# Patient Record
Sex: FEMALE | Race: BLACK OR AFRICAN AMERICAN | NOT HISPANIC OR LATINO | Employment: OTHER | URBAN - METROPOLITAN AREA
[De-identification: names, ages, dates, MRNs, and addresses within clinical notes are randomized per-mention and may not be internally consistent; named-entity substitution may affect disease eponyms.]

---

## 2017-03-28 ENCOUNTER — TELEPHONE (OUTPATIENT)
Dept: NEUROLOGY | Facility: CLINIC | Age: 62
End: 2017-03-28

## 2017-03-28 ENCOUNTER — OFFICE VISIT (OUTPATIENT)
Dept: NEUROLOGY | Facility: CLINIC | Age: 62
End: 2017-03-28
Payer: MEDICARE

## 2017-03-28 VITALS
SYSTOLIC BLOOD PRESSURE: 110 MMHG | HEART RATE: 104 BPM | DIASTOLIC BLOOD PRESSURE: 70 MMHG | WEIGHT: 209.69 LBS | BODY MASS INDEX: 34.93 KG/M2 | HEIGHT: 65 IN

## 2017-03-28 DIAGNOSIS — G43.009 MIGRAINE WITHOUT AURA, WITHOUT MENTION OF INTRACTABLE MIGRAINE WITHOUT MENTION OF STATUS MIGRAINOSUS: Primary | ICD-10-CM

## 2017-03-28 PROCEDURE — 99999 PR PBB SHADOW E&M-EST. PATIENT-LVL III: CPT | Mod: PBBFAC,,, | Performed by: NURSE PRACTITIONER

## 2017-03-28 PROCEDURE — 99213 OFFICE O/P EST LOW 20 MIN: CPT | Mod: S$GLB,,, | Performed by: NURSE PRACTITIONER

## 2017-03-28 PROCEDURE — 1160F RVW MEDS BY RX/DR IN RCRD: CPT | Mod: S$GLB,,, | Performed by: NURSE PRACTITIONER

## 2017-03-28 RX ORDER — TOPIRAMATE 200 MG/1
200 TABLET ORAL 2 TIMES DAILY
Qty: 60 TABLET | Refills: 5 | Status: SHIPPED | OUTPATIENT
Start: 2017-03-28 | End: 2017-06-08

## 2017-03-28 NOTE — PROGRESS NOTES
"NEURO CLINIC:    Lacey Montana a 61 y.o. female returns for management of headaches. Not seen since 2015. She sees Dr. Franklin for mgt of seizures but wants another provider. HA are off and on. Takes aleve which helps abort pain. She rarely uses Compazine prn. She remains on Topamax 200mg bid. HA remains mild, located R occipital area occasionally radiating whole head, pounding, gradual onset, with infrequent auras (sour taste or burning smell x a few minutes (can also occur in the absence of a headache), associated with nausea, vomiting, photo/phono/osmophobia. Last seizure 2-mos ago when found out brother had cancer/hehas .     Triggers remain sleep deprivation, hunger, dietary triggers (pork, salty foods,excessive caffeine, excessive sweets, excessive chocolates), strong smells, STRESS  Aggravating Factors: bright light, loud noise, movement  Relieving Factors: rest in a dark quiet room, listening to soft music    She is living in [facility]    Medications tried:   Preventatives: Topamax 200 mg bid- started in  for seizures  Keppra 750 mg bid- started in  for seizures, now 1G bid  Abortives: Tramadol 50 mg- ineffective  Tylenol liquid-used to work      Reviewed Dec '15 AST/ALT    ROS: Persistent problems with sleep maintenance and initiation.  Stable mood. Denies falls.nocturia.   L sided residual weakness (CVA), wears left leg brace. 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 (patient unaware of location/size of aneursym, or whether she had clipping/coiling) complicated by seizures and with residual L hemiparesis      SH: on disability, used to work as a /  FH: negative for HA    HIT-6 score=54      PHYSICAL EXAM:   General: W/D, W/N, well-groomed   /70  Pulse 104  Ht 5' 4.5" (1.638 m)  Wt 95.1 kg (209 lb 10.5 oz)  BMI 35.43 kg/m2      IMPRESSION: "   Chronic migraine with and w/o aura   Anxiety   Insomnia (Problems with sleep initiation/maintenance)  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      Do not discontinue medications without advice. An adequate therapeutic trial of any preventative may take anywhere from 2-3 weeks or even up to 3 months and gradual upward titration of dose may be necessary in some patients       Abortive therapy: Avoid triptans/ergots due to hx of CVA    Continue Compazine 10 mg q6-8h prn for HA/nausea     Continue Aleve prn    Continue to regulate sleep, mealtimes, try to reduce stress and to avoid known headache triggers/aggravating factors (stress)     Advised regarding medication overuse headache. Try to limit acute/abortive medications to not more than 2-3 days/week to prevent further progression of headache     RTC 12 mos sooner if needed      See pcp stroke mgt or neuro for seizure mgt ([facility]).

## 2017-03-28 NOTE — TELEPHONE ENCOUNTER
VINI with scheduled appointment for with Dr.Van Resendez on [phone] as patient requested second opinion per Destiny Moreno. Appointment letter will be mailed to her home, and contact information has been left if patient needs to reschedule this.

## 2017-04-11 ENCOUNTER — PATIENT OUTREACH (OUTPATIENT)
Dept: ADMINISTRATIVE | Facility: HOSPITAL | Age: 62
End: 2017-04-11

## 2017-04-18 ENCOUNTER — OFFICE VISIT (OUTPATIENT)
Dept: NEUROLOGY | Facility: CLINIC | Age: 62
End: 2017-04-18
Payer: MEDICARE

## 2017-04-18 VITALS
DIASTOLIC BLOOD PRESSURE: 84 MMHG | HEIGHT: 65 IN | WEIGHT: 209.44 LBS | BODY MASS INDEX: 34.89 KG/M2 | SYSTOLIC BLOOD PRESSURE: 131 MMHG | HEART RATE: 65 BPM

## 2017-04-18 DIAGNOSIS — Z86.79 HISTORY OF INTRACRANIAL ANEURYSM: ICD-10-CM

## 2017-04-18 DIAGNOSIS — G40.109 PARTIAL SYMPTOMATIC EPILEPSY WITH SIMPLE PARTIAL SEIZURES, NOT INTRACTABLE, WITHOUT STATUS EPILEPTICUS: Primary | ICD-10-CM

## 2017-04-18 PROCEDURE — 99203 OFFICE O/P NEW LOW 30 MIN: CPT | Mod: S$PBB,,, | Performed by: PSYCHIATRY & NEUROLOGY

## 2017-04-18 PROCEDURE — 99999 PR PBB SHADOW E&M-EST. PATIENT-LVL III: CPT | Mod: PBBFAC,GC,, | Performed by: PSYCHIATRY & NEUROLOGY

## 2017-04-18 RX ORDER — LEVETIRACETAM 1000 MG/1
1500 TABLET ORAL 2 TIMES DAILY
Qty: 90 TABLET | Refills: 11 | Status: SHIPPED | OUTPATIENT
Start: 2017-04-18 | End: 2018-04-18

## 2017-04-18 RX ORDER — LEVETIRACETAM 1000 MG/1
1500 TABLET ORAL 2 TIMES DAILY
COMMUNITY
End: 2017-04-18 | Stop reason: DRUGHIGH

## 2017-04-18 NOTE — PROGRESS NOTES
"Patient Name: Lacey Montana  MRN: [id number]    CC: Seizures, establish care.     HPI: Lacey Montana is a 61 y.o. 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 Surgery was done at the [facility] in [place]. [facility] She was started on phenobarbital and another dilantin. Patient used to see Dr. Cici Lozano for seizures and has been on Topamax and Keppra. Patient states she has about 2-3 seizures per year. Seizures precipitated by stress and sleep deprivation.     Last seizure 2 weeks ago on Friday. Prior to that is was 3-4 months ago. Started with eyelid flicker and tingling in hands with watering of mouth. Knew she was going to have a seizure. Mouth started twitching and speech slurring. No urinary incontinence or tongue biting. Patient aware and awake the whole time. States that she had 3 of them back to back. "Each one" lasted 2 mins and stopped. Post-ictally, patient was dysarthric, resolved in a few hours.  Patients brother  1 month ago. Patient does not endorse any missed doses.     Per chart review, patient had had episodes of status, patient denying any overnight stays or ICU admissions for seizure. Unclear which AEDs brought patient out of status. Patient still having seizures. Serum AED levels ok in 2015.    Seizure Seminology  Seizure Type 1  Classification: Unk  Aura - patient can feel it coming on (tingling in her toes or trickle down the mouth or twitch in her right arm or heart racing, sometimes hears a bell sound)- different symptoms every time  Ictus  - Nonconv - mouth gets dry, throat swelling, she spits   - Conv - mouth begins twitching, pt focuses on trying not to get upset about it, she prays herself through it, awareness is intact, she sometimes cries, left " >> right side twitching, No loss of consciousness, tongue biting or incontinence bladder/bowel.  - Duration -  Few minutes, can occur in clusters  Post-ictal  - Symptoms   - Duration  Age of onset   Current Seizure Frequency -  2-3/year   Last Seizure 2 weeks ago      ROS:   Review of Systems   Constitutional: Negative for malaise/fatigue. Negative for weight loss.   HENT: Negative for hearing loss.   Eyes: Negative for blurred vision and double vision.   Respiratory: Negative for shortness of breath and stridor.   Cardiovascular: Negative for chest pain and palpitations.   Gastrointestinal: Negative for nausea, vomiting and constipation.   Genitourinary: Negative for frequency. Negative for urgency.   Musculoskeletal: Negative for joint pain. Negative for myalgias and falls.   Skin: Negative for rash.   Neurological: Negative for dizziness and tremors. Negative for focal weakness and seizures.   Endo/Heme/Allergies: Does not bruise/bleed easily.   Psychiatric/Behavioral: Negative for memory loss. Negative for depression and hallucinations. The patient is not nervous/anxious.      Past Medical History  Past Medical History:   Diagnosis Date    Anticoagulant long-term use     Encounter for blood transfusion     GERD (gastroesophageal reflux disease)     Seizures     Stroke        Medications    Current Outpatient Prescriptions:     dipyridamole-aspirin 200-25 mg (AGGRENOX)  mg CM12, Take 1 capsule by mouth 2 (two) times daily., Disp: 60 capsule, Rfl: 11    epinephrine (EPIPEN) 0.3 mg/0.3 mL (1:1,000) AtIn, Inject into the muscle once., Disp: , Rfl:     levetiracetam (KEPPRA) 1000 MG tablet, Take 1,000 mg by mouth 2 (two) times daily., Disp: , Rfl:     topiramate (TOPAMAX) 200 MG Tab, Take 1 tablet (200 mg total) by mouth 2 (two) times daily., Disp: 60 tablet, Rfl: 5    Allergies  Review of patient's allergies indicates:   Allergen Reactions    Chocolate flavor     Codeine     Percocet  "[oxycodone-acetaminophen]        Social History  Social History     Social History    Marital status: Single     Spouse name: N/A    Number of children: N/A    Years of education: N/A     Occupational History    Not on file.     Social History Main Topics    Smoking status: Former Smoker    Smokeless tobacco: Not on file    Alcohol use No      Comment: special occasion    Drug use: Not on file    Sexual activity: Not on file     Other Topics Concern    Not on file     Social History Narrative       Family History  Family History   Problem Relation Age of Onset    Seizures Daughter 14     *pseudoseizures per patient       Physical Exam  /84  Pulse 65  Ht 5' 5" (1.651 m)  Wt 95 kg (209 lb 7 oz)  BMI 34.85 kg/m2    General appearance: Well-developed, well-groomed.     Neurologic Exam: The patient is awake, alert and oriented. Language is fluent. Recent and remote memory are normal. Attention span and concentration are normal. Fund of knowledge is appropriate.     Cranial nerves: pupils are round and reactive to light and accommodation. Visual fields are full to confrontation. Fundoscopic exam reveals sharp discs bilaterally, with good venous pulsations. Ocular motility is full in all cardinal positions of gaze. Facial sensation is normal to pinprick and light touch. Corneal reflexes are present bilaterally. Facial activation is symmetric. Hearing is normal bilaterally. Palate elevates symmetrically and gag reflex is intact bilaterally. Shoulder elevation is symmetric and full strength bilaterally. Tongue is midline and neck rotation strength is normal bilaterally. Neck range of motion is normal.     Motor examination of all extremities demonstrates normal bulk and tone in all four limbs. There are no atrophy or fasciculations. Strength is 5/5 in the upper and lower extremities bilaterally without pronator drift.     Sensory examination is normal to pinprick, vibration and proprioception in the " upper and lower extremities bilaterally. Romberg is negative.    Deep tendon reflexes are 2+ and symmetric in the upper and lower extremities bilaterally. Toes are mute bilaterally.     Gait: Normal heel, toe, tandem, and casual gait.    Coordination: Finger to nose and heel to shin testing is normal in both upper and lower extremities. Rapid alternating movements are normal in both upper and lower extremities.     General exam  Cardiovascular: regular rate and rhythm with no murmurs, rubs or gallops. There are no carotid or vertebral artery bruits. Pulses in both upper and lower extremities are symmetric. There is no peripheral edema.   Head and neck: no cervical lymphadenopathy      Lab and Test Results    WBC   Date Value Ref Range Status   12/08/2015 4.34 3.90 - 12.70 K/uL Final   10/13/2015 3.67 (L) 3.90 - 12.70 K/uL Final   10/13/2014 4.38 3.90 - 12.70 K/uL Final     Hemoglobin   Date Value Ref Range Status   12/08/2015 13.9 12.0 - 16.0 g/dL Final   10/13/2015 14.5 12.0 - 16.0 g/dL Final   10/13/2014 13.8 12.0 - 16.0 g/dL Final     Hematocrit   Date Value Ref Range Status   12/08/2015 42.9 37.0 - 48.5 % Final   10/13/2015 44.2 37.0 - 48.5 % Final   10/13/2014 40.9 37.0 - 48.5 % Final     Platelets   Date Value Ref Range Status   12/08/2015 170 150 - 350 K/uL Final   10/13/2015 171 150 - 350 K/uL Final   10/13/2014 164 150 - 350 K/uL Final     Glucose   Date Value Ref Range Status   12/23/2015 109 70 - 110 mg/dL Final   12/08/2015 96 70 - 110 mg/dL Final   10/13/2015 95 70 - 110 mg/dL Final     Sodium   Date Value Ref Range Status   12/23/2015 142 136 - 145 mmol/L Final   12/08/2015 139 136 - 145 mmol/L Final   10/13/2015 142 136 - 145 mmol/L Final     Potassium   Date Value Ref Range Status   12/23/2015 3.7 3.5 - 5.1 mmol/L Final   12/08/2015 3.4 (L) 3.5 - 5.1 mmol/L Final   10/13/2015 3.9 3.5 - 5.1 mmol/L Final     Chloride   Date Value Ref Range Status   12/23/2015 110 95 - 110 mmol/L Final   12/08/2015 107  95 - 110 mmol/L Final   10/13/2015 110 95 - 110 mmol/L Final     CO2   Date Value Ref Range Status   12/23/2015 26 23 - 29 mmol/L Final   12/08/2015 22 (L) 23 - 29 mmol/L Final   10/13/2015 26 23 - 29 mmol/L Final     BUN, Bld   Date Value Ref Range Status   12/23/2015 16 6 - 20 mg/dL Final   12/08/2015 15 6 - 20 mg/dL Final   10/13/2015 11 6 - 20 mg/dL Final     Creatinine   Date Value Ref Range Status   12/23/2015 0.8 0.5 - 1.4 mg/dL Final   12/08/2015 0.8 0.5 - 1.4 mg/dL Final   10/13/2015 0.9 0.5 - 1.4 mg/dL Final     Calcium   Date Value Ref Range Status   12/23/2015 9.1 8.7 - 10.5 mg/dL Final   12/08/2015 8.9 8.7 - 10.5 mg/dL Final   10/13/2015 9.1 8.7 - 10.5 mg/dL Final     Magnesium   Date Value Ref Range Status   10/13/2014 2.1 1.6 - 2.6 mg/dL Final     Phosphorus   Date Value Ref Range Status   10/13/2014 2.7 2.7 - 4.5 mg/dL Final     Alkaline Phosphatase   Date Value Ref Range Status   12/08/2015 134 55 - 135 U/L Final   10/13/2015 126 55 - 135 U/L Final   10/13/2014 141 (H) 55 - 135 U/L Final     ALT   Date Value Ref Range Status   12/08/2015 13 10 - 44 U/L Final   10/13/2015 12 10 - 44 U/L Final   10/13/2014 12 10 - 44 U/L Final     AST   Date Value Ref Range Status   12/08/2015 15 10 - 40 U/L Final   10/13/2015 18 10 - 40 U/L Final   10/13/2014 22 10 - 40 U/L Final         Images:   MRI Brain w wo 2014-    Remote operative change from right frontal temporal parietal craniotomy with moderate right frontal and parietal encephalomalacia again identified.    Ill-defined band of enhancement within the central portion of encephalomalacia again seen are please see prior report for further details.    There is slight asymmetry of the temporal lobes left smaller than the right as identified on the prior study without underlying signal abnormality.  Clinical correlation and correlation with EEG findings is advised.  Independently reviewed     Other Tests  EEG- 2014    Seizure #1 was associated with left facial  hemispasms and twitches.  The patient   reported left upper extremity repetitive clenching of the fist during this   episode.  Also, during this episode, the patient had voluntary flapping of the   right upper extremity in order to gain attention from the nurses.  The patient   also had notable trouble speaking but was able to follow all commands.  Towards   the end of the episode, the patient noted accumulation of drool in her mouth,   which she later spit out.     Electrographically, this seizure was characterized by evolving discharges   maximally at CZ and C4.  The activity was low amplitude with evolving frequency   and morphology into a spike and wave discharge.  The amplitude and frequency   waxed and waned and the activity gradually spread to the right temporal channels   and subsequently left parasagittal as well as left posterior regions.  The   activity evolved into a polyspike and wave discharge, maximal in the right   parasagittal as well as central channels.  The offset was characterized by   immediate return to interictal background.     Seizure #2 was characterized by trouble speaking, left facial hemispasms and   twitching.     Electrographically, this seizure, presented similarly to seizure #1.     Seizure #3 was subclinical and occurred while the patient was eating.     Electrographically, this seizure, presented initially in the same manner as   seizure #1, it was limited to the right parasagittal and central channels.     Seizure #4 occurred while the patient was eating.  The patient noted facial   twitching on the left and accumulation of saliva in her mouth, which she later   spit out.  The patient reported that her left upper extremity had repetitive   clenching.  Electrographically, this presented similarly to seizure #1.     Seizure #5 presented clinically and electrographically similar to seizure #1.     Seizure #6 presented clinically and electrographically similar to seizure #1.      Seizure #7 presented clinically and electrographically similar to seizure #1.     FINAL SUMMARY:  ELECTROENCEPHALOGRAM:  Interictal:  This is an abnormal EEG during wakefulness, drowsiness and sleep.    Focal spike and wave discharges were noted maximally at CZ and C4.  Rare sharp   waves were noted maximally at F8 and T4 as well as FZ.  Burst of gamma range   frequencies discharges were noted at FZ.  Vertex waves were asymmetric with the   higher amplitude over the left hemisphere when compared to the right hemisphere.     Ictal:  During this recording, seven of the patient's typical events were   recorded.  Six of these events were associated with clinical symptoms described   above.  One of these events was subclinical.  Electrographically all of the   events were stereotypical with seizures emanating from the central and right   parasagittal region.  Clinically, this correlates with history of right   frontotemporal parietal craniotomy with moderate right frontal and parietal   encephalomalacia.  The seizure onset region likely includes area of   encephalomalacia and/or scar tissue.     MRI Brain w wo 2014-  Remote operative change from right frontal temporal parietal craniotomy with moderate right frontal and parietal encephalomalacia again identified.    Ill-defined band of enhancement within the central portion of encephalomalacia again seen are please see prior report for further details.    There is slight asymmetry of the temporal lobes left smaller than the right as identified on the prior study without underlying signal abnormality.  Clinical correlation and correlation with EEG findings is advised.    Assessment and Plan    Lacey Montana is a 61 y.o. 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 Patient currently taking keppra and topamax. Patient was initially maxed out on topamax and then started on keppra. Will increase keppra. If patient contineus to have seizures,  will obtain EEG and add 3rd AED, possibly vimpat.       Continue topamax 200 mg bid  Increase Keppra to 1.5g bid (start with 1 pill in am and 1.5 pills at night- if tolerated for 1-2 weeks, then take as prescribed 1.5 g bid)  Multivitamin  Topamax and keppra levels  No baths, only showers, no driving  DEXA scan to evaluate for osteopenia/porosis - long term AEDs    RTC 6 MONTHS    Susanevans Weber MD  Neurology Resident   [facility]  [address] [place] [address]  Pager: [phone]

## 2017-04-18 NOTE — MR AVS SNAPSHOT
[facility]  [address]  [facility] [address]  Phone: [phone]  Fax: [phone]                  Lacey Montana   2017 3:00 PM   Office Visit    Description:  Female : 1955   Provider:  Susan Weber MD   Department:  [facility]           Reason for Visit     Consult     Seizures           Diagnoses this Visit        Comments    Partial symptomatic epilepsy with simple partial seizures, not intractable, without status epilepticus    -  Primary     History of intracranial aneurysm                To Do List           Future Appointments        Provider Department Dept Phone    2017 9:20 AM Svetlana Rodriguez MD [facility] [phone]      Goals (5 Years of Data)     None      Follow-Up and Disposition     Return in about 6 months (around 10/18/2017).       These Medications        Disp Refills Start End    levetiracetam (KEPPRA) 1000 MG tablet 90 tablet 11 2017    Take 1.5 tablets (1,500 mg total) by mouth 2 (two) times daily. - Oral    Pharmacy: [facility] - [address] #: [phone]         [facility]     [facility] Care Line -  Assistance  Unless otherwise directed by your provider, please contact [facility], our nurse care line that is available for  assistance.     Registered nurses in the [facility] provide: appointment scheduling, clinical advisement, health education, and other advisory services.  Call: [phone] (toll free)               Medications           Message regarding Medications     Verify the changes and/or additions to your medication regime listed below are the same as discussed with your clinician today.  If any of these changes or additions are incorrect, please notify your healthcare provider.        START taking these NEW medications        Refills    levetiracetam (KEPPRA) 1000 MG  "tablet 11    Sig: Take 1.5 tablets (1,500 mg total) by mouth 2 (two) times daily.    Class: Normal    Route: Oral           Verify that the below list of medications is an accurate representation of the medications you are currently taking.  If none reported, the list may be blank. If incorrect, please contact your healthcare provider. Carry this list with you in case of emergency.           Current Medications     dipyridamole-aspirin 200-25 mg (AGGRENOX)  mg CM12 Take 1 capsule by mouth 2 (two) times daily.    epinephrine (EPIPEN) 0.3 mg/0.3 mL (1:1,000) AtIn Inject into the muscle once.    levetiracetam (KEPPRA) 1000 MG tablet Take 1.5 tablets (1,500 mg total) by mouth 2 (two) times daily.    topiramate (TOPAMAX) 200 MG Tab Take 1 tablet (200 mg total) by mouth 2 (two) times daily.           Clinical Reference Information           Your Vitals Were     BP Pulse Height Weight BMI    131/84 65 5' 5" (1.651 m) 95 kg (209 lb 7 oz) 34.85 kg/m2      Blood Pressure          Most Recent Value    BP  131/84      Allergies as of 4/18/2017     Chocolate Flavor    Codeine    Percocet [Oxycodone-acetaminophen]      Immunizations Administered on Date of Encounter - 4/18/2017     None      Orders Placed During Today's Visit     Future Labs/Procedures Expected by Expires    DXA Body Composition  4/18/2017 4/18/2018    Levetiracetam level  4/18/2017 6/17/2018    TOPIRAMATE LEVEL  4/18/2017 6/17/2018      [facility] Sign-Up     Activating your [facility] account is as easy as 1-2-3!     1) Visit [email or web address], select Sign Up Now, enter this activation code and your date of birth, then select Next.  [email or web address]  Expires: 6/2/2017  1:35 PM      2) Create a username and password to use when you visit [facility] in the future and select a security question in case you lose your password and select Next.    3) Enter your e-mail address and click Sign Up!    Additional Information  If you have questions, please e-mail " [email or web address] or call [phone] to talk to our [facility] staff. Remember, [facility] is NOT to be used for urgent needs. For medical emergencies, dial 911.         Instructions    INCREASE KEPPRA TO ONE AND A HALF PILLS TWICE A DAY.    CAN START TAKING ONE AND A HALF PILL AT NIGHT AND CONTINUE ONE PILL IN AM. IF TOLERATING WELL, CAN TAKE ONE AND A HALF PILL IN THE AM AS WELL.    LABS       Language Assistance Services     ATTENTION: Language assistance services are available, free of charge. Please call [phone].      ATENCIÓN: Si habla español, tiene a conteh disposición servicios gratuitos de asistencia lingüística. Llame al [phone].     JALEESA Ý: N?u b?n nói Ti?ng Vi?t, có các d?ch v? h? tr? ngôn ng? mi?n phí dành cho b?n. G?i s? [phone].         [facility] complies with applicable Federal civil rights laws and does not discriminate on the basis of race, color, national origin, age, disability, or sex.

## 2017-04-18 NOTE — PATIENT INSTRUCTIONS
INCREASE KEPPRA TO ONE AND A HALF PILLS TWICE A DAY.    CAN START TAKING ONE AND A HALF PILL AT NIGHT AND CONTINUE ONE PILL IN AM. IF TOLERATING WELL, CAN TAKE ONE AND A HALF PILL IN THE AM AS WELL.    LABS

## 2017-04-25 ENCOUNTER — OFFICE VISIT (OUTPATIENT)
Dept: FAMILY MEDICINE | Facility: CLINIC | Age: 62
End: 2017-04-25
Attending: FAMILY MEDICINE
Payer: MEDICARE

## 2017-04-25 ENCOUNTER — LAB VISIT (OUTPATIENT)
Dept: LAB | Facility: HOSPITAL | Age: 62
End: 2017-04-25
Attending: FAMILY MEDICINE
Payer: MEDICARE

## 2017-04-25 VITALS
HEIGHT: 65 IN | SYSTOLIC BLOOD PRESSURE: 126 MMHG | BODY MASS INDEX: 34.71 KG/M2 | WEIGHT: 208.31 LBS | DIASTOLIC BLOOD PRESSURE: 74 MMHG | OXYGEN SATURATION: 95 % | HEART RATE: 75 BPM

## 2017-04-25 DIAGNOSIS — R60.9 EDEMA, UNSPECIFIED TYPE: ICD-10-CM

## 2017-04-25 DIAGNOSIS — Z01.419 PAP SMEAR, AS PART OF ROUTINE GYNECOLOGICAL EXAMINATION: ICD-10-CM

## 2017-04-25 DIAGNOSIS — Z12.11 SCREEN FOR COLON CANCER: ICD-10-CM

## 2017-04-25 DIAGNOSIS — Z01.419 PAP SMEAR, AS PART OF ROUTINE GYNECOLOGICAL EXAMINATION: Primary | ICD-10-CM

## 2017-04-25 DIAGNOSIS — E78.00 HYPERCHOLESTEROLEMIA: ICD-10-CM

## 2017-04-25 DIAGNOSIS — N39.0 URINARY TRACT INFECTION WITHOUT HEMATURIA, SITE UNSPECIFIED: ICD-10-CM

## 2017-04-25 LAB
ALBUMIN SERPL BCP-MCNC: 3.7 G/DL
ALP SERPL-CCNC: 100 U/L
ALT SERPL W/O P-5'-P-CCNC: 16 U/L
ANION GAP SERPL CALC-SCNC: 10 MMOL/L
AST SERPL-CCNC: 19 U/L
BASOPHILS # BLD AUTO: 0.02 K/UL
BASOPHILS NFR BLD: 0.5 %
BILIRUB SERPL-MCNC: 0.5 MG/DL
BILIRUB SERPL-MCNC: NEGATIVE MG/DL
BLOOD URINE, POC: ABNORMAL
BUN SERPL-MCNC: 15 MG/DL
CALCIUM SERPL-MCNC: 9.2 MG/DL
CHLORIDE SERPL-SCNC: 112 MMOL/L
CHOLEST/HDLC SERPL: 3.7 {RATIO}
CO2 SERPL-SCNC: 21 MMOL/L
COLOR, POC UA: YELLOW
CREAT SERPL-MCNC: 0.8 MG/DL
DIFFERENTIAL METHOD: ABNORMAL
EOSINOPHIL # BLD AUTO: 0 K/UL
EOSINOPHIL NFR BLD: 0 %
ERYTHROCYTE [DISTWIDTH] IN BLOOD BY AUTOMATED COUNT: 13 %
EST. GFR  (AFRICAN AMERICAN): >60 ML/MIN/1.73 M^2
EST. GFR  (NON AFRICAN AMERICAN): >60 ML/MIN/1.73 M^2
GLUCOSE SERPL-MCNC: 94 MG/DL
GLUCOSE UR QL STRIP: NORMAL
HCT VFR BLD AUTO: 44.2 %
HDL/CHOLESTEROL RATIO: 26.8 %
HDLC SERPL-MCNC: 213 MG/DL
HDLC SERPL-MCNC: 57 MG/DL
HGB BLD-MCNC: 14.4 G/DL
KETONES UR QL STRIP: NEGATIVE
LDLC SERPL CALC-MCNC: 132.6 MG/DL
LEUKOCYTE ESTERASE URINE, POC: ABNORMAL
LYMPHOCYTES # BLD AUTO: 1.5 K/UL
LYMPHOCYTES NFR BLD: 40.1 %
MCH RBC QN AUTO: 29.8 PG
MCHC RBC AUTO-ENTMCNC: 32.6 %
MCV RBC AUTO: 92 FL
MONOCYTES # BLD AUTO: 0.3 K/UL
MONOCYTES NFR BLD: 8.1 %
NEUTROPHILS # BLD AUTO: 2 K/UL
NEUTROPHILS NFR BLD: 51.3 %
NITRITE, POC UA: NEGATIVE
NONHDLC SERPL-MCNC: 156 MG/DL
PH, POC UA: 5
PLATELET # BLD AUTO: 183 K/UL
PMV BLD AUTO: 10.7 FL
POTASSIUM SERPL-SCNC: 3.7 MMOL/L
PROT SERPL-MCNC: 7.4 G/DL
PROTEIN, POC: ABNORMAL
RBC # BLD AUTO: 4.83 M/UL
SODIUM SERPL-SCNC: 143 MMOL/L
SPECIFIC GRAVITY, POC UA: 1.02
TRIGL SERPL-MCNC: 117 MG/DL
TSH SERPL DL<=0.005 MIU/L-ACNC: 2.49 UIU/ML
UROBILINOGEN, POC UA: NORMAL
WBC # BLD AUTO: 3.84 K/UL

## 2017-04-25 PROCEDURE — 84443 ASSAY THYROID STIM HORMONE: CPT

## 2017-04-25 PROCEDURE — 85025 COMPLETE CBC W/AUTO DIFF WBC: CPT

## 2017-04-25 PROCEDURE — 81001 URINALYSIS AUTO W/SCOPE: CPT | Mod: S$GLB,,, | Performed by: FAMILY MEDICINE

## 2017-04-25 PROCEDURE — 86803 HEPATITIS C AB TEST: CPT

## 2017-04-25 PROCEDURE — 99396 PREV VISIT EST AGE 40-64: CPT | Mod: 25,S$GLB,, | Performed by: FAMILY MEDICINE

## 2017-04-25 PROCEDURE — 99999 PR PBB SHADOW E&M-EST. PATIENT-LVL III: CPT | Mod: PBBFAC,,, | Performed by: FAMILY MEDICINE

## 2017-04-25 PROCEDURE — 80061 LIPID PANEL: CPT

## 2017-04-25 PROCEDURE — 36415 COLL VENOUS BLD VENIPUNCTURE: CPT | Mod: PO

## 2017-04-25 PROCEDURE — 80053 COMPREHEN METABOLIC PANEL: CPT

## 2017-04-25 RX ORDER — TRIAMCINOLONE ACETONIDE 1 MG/G
CREAM TOPICAL 2 TIMES DAILY
Qty: 80 G | Refills: 0 | Status: SHIPPED | OUTPATIENT
Start: 2017-04-25 | End: 2017-05-05

## 2017-04-25 RX ORDER — HYDROCHLOROTHIAZIDE 25 MG/1
25 TABLET ORAL DAILY
Qty: 90 TABLET | Refills: 3 | Status: SHIPPED | OUTPATIENT
Start: 2017-04-25 | End: 2018-04-25

## 2017-04-25 RX ORDER — SULFAMETHOXAZOLE AND TRIMETHOPRIM 800; 160 MG/1; MG/1
1 TABLET ORAL 2 TIMES DAILY
Qty: 14 TABLET | Refills: 0 | Status: SHIPPED | OUTPATIENT
Start: 2017-04-25 | End: 2017-10-11

## 2017-04-25 NOTE — MR AVS SNAPSHOT
[facility] Medicine  [address], [address]  [place] [address]  Phone: [phone]                  Lacey Montana   2017 9:20 AM   Office Visit    Description:  Female : 1955   Provider:  Svetlana Rodriguez MD   Department:  [facility]           Reason for Visit     Gynecologic Exam           Diagnoses this Visit        Comments    Pap smear, as part of routine gynecological examination    -  Primary     Edema, unspecified type         Hypercholesterolemia         Screen for colon cancer                To Do List           Goals (5 Years of Data)     None       These Medications        Disp Refills Start End    hydrochlorothiazide (HYDRODIURIL) 25 MG tablet 90 tablet 3 2017    Take 1 tablet (25 mg total) by mouth once daily. - Oral    Pharmacy: [facility] - [address] #: [phone]         [facility]     [facility] On Call Nurse Care Line -  Assistance  Unless otherwise directed by your provider, please contact [facility], our nurse care line that is available for  assistance.     Registered nurses in the [facility] provide: appointment scheduling, clinical advisement, health education, and other advisory services.  Call: [phone] (toll free)               Medications           Message regarding Medications     Verify the changes and/or additions to your medication regime listed below are the same as discussed with your clinician today.  If any of these changes or additions are incorrect, please notify your healthcare provider.        START taking these NEW medications        Refills    hydrochlorothiazide (HYDRODIURIL) 25 MG tablet 3    Sig: Take 1 tablet (25 mg total) by mouth once daily.    Class: Normal    Route: Oral           Verify that the below list of medications is an accurate representation of the medications you are currently  "taking.  If none reported, the list may be blank. If incorrect, please contact your healthcare provider. Carry this list with you in case of emergency.           Current Medications     dipyridamole-aspirin 200-25 mg (AGGRENOX)  mg CM12 Take 1 capsule by mouth 2 (two) times daily.    epinephrine (EPIPEN) 0.3 mg/0.3 mL (1:1,000) AtIn Inject into the muscle once.    levetiracetam (KEPPRA) 1000 MG tablet Take 1.5 tablets (1,500 mg total) by mouth 2 (two) times daily.    topiramate (TOPAMAX) 200 MG Tab Take 1 tablet (200 mg total) by mouth 2 (two) times daily.    hydrochlorothiazide (HYDRODIURIL) 25 MG tablet Take 1 tablet (25 mg total) by mouth once daily.           Clinical Reference Information           Your Vitals Were     BP Pulse Height Weight SpO2 BMI    126/74 (BP Location: Left arm, Patient Position: Sitting, BP Method: Manual) 75 5' 5" (1.651 m) 94.5 kg (208 lb 4.8 oz) 95% 34.66 kg/m2      Blood Pressure          Most Recent Value    BP  126/74      Allergies as of 4/25/2017     Chocolate Flavor    Codeine    Percocet [Oxycodone-acetaminophen]      Immunizations Administered on Date of Encounter - 4/25/2017     None      Orders Placed During Today's Visit      Normal Orders This Visit    Case request GI: COLONOSCOPY     Liquid-based pap smear, screening     POCT urinalysis, dipstick or tablet reag     Future Labs/Procedures Expected by Expires    CBC auto differential  4/25/2017 4/25/2018    Comprehensive metabolic panel  4/25/2017 4/25/2018    Hepatitis C antibody  4/25/2017 6/24/2018    Lipid panel  4/25/2017 6/24/2018    TSH  4/25/2017 4/25/2018    Zoster Vaccine - Live  As directed 4/25/2018      Language Assistance Services     ATTENTION: Language assistance services are available, free of charge. Please call [phone].      ATENCIÓN: Si habla hubert, tiene a conteh disposición servicios gratuitos de asistencia lingüística. Llame al [phone].     CHÚ Ý: N?u b?n nói Ti?ng Vi?t, có các d?ch " v? h? tr? shayla ng? mi?n phí dành cho b?n. G?i s? [phone].         [facility] complies with applicable Federal civil rights laws and does not discriminate on the basis of race, color, national origin, age, disability, or sex.

## 2017-04-25 NOTE — PROGRESS NOTES
Subjective:       Patient ID: Lacey Montana is a 61 y.o. female.    Chief Complaint: Gynecologic Exam    HPI   Pt is here for wwe she is well s/p hyst requests pap smear no vag discharge no itching or burning no dysuria or hematuria no vaginal bleeding no brbpr   Pt has hypercholesterolemia diet controlled pt has left sided weakness and seizure d/o followed intracranial aneurism rupture pt is follwd closely in neurology  Review of Systems   Constitutional: Negative for activity change, chills, diaphoresis, fatigue and fever.   HENT: Negative for congestion, ear discharge, ear pain, hearing loss, postnasal drip, rhinorrhea, sinus pressure, sneezing, sore throat, trouble swallowing and voice change.    Eyes: Negative for photophobia, discharge, redness, itching and visual disturbance.   Respiratory: Negative for cough, chest tightness, shortness of breath and wheezing.    Cardiovascular: Negative for chest pain, palpitations and leg swelling.   Gastrointestinal: Negative for abdominal pain, anal bleeding, blood in stool, constipation, diarrhea, nausea, rectal pain and vomiting.   Genitourinary: Negative for dyspareunia, dysuria, flank pain, frequency, hematuria, menstrual problem, pelvic pain, urgency, vaginal bleeding and vaginal discharge.   Musculoskeletal: Negative for arthralgias, back pain, joint swelling and neck pain.   Skin: Negative for color change and rash.   Neurological: Positive for seizures and weakness. Negative for dizziness, speech difficulty, light-headedness, numbness and headaches.   Hematological: Does not bruise/bleed easily.   Psychiatric/Behavioral: Negative for agitation, confusion, decreased concentration, sleep disturbance and suicidal ideas. The patient is not nervous/anxious.        Objective:      Physical Exam   Constitutional: She is oriented to person, place, and time. She appears well-developed and well-nourished.   HENT:   Head: Normocephalic and atraumatic.   Right Ear:  "External ear normal.   Left Ear: External ear normal.   Nose: Nose normal.   Mouth/Throat: Oropharynx is clear and moist.   Eyes: Conjunctivae and EOM are normal. Pupils are equal, round, and reactive to light. Right eye exhibits no discharge. Left eye exhibits no discharge.   Neck: Normal range of motion. Neck supple. No thyromegaly present.   Cardiovascular: Normal rate, regular rhythm, normal heart sounds and intact distal pulses.  Exam reveals no gallop and no friction rub.    Pulmonary/Chest: Effort normal and breath sounds normal. She has no wheezes. She has no rales.   Abdominal: Soft. Bowel sounds are normal. She exhibits no distension. There is no tenderness. There is no rebound and no guarding.   Genitourinary: Vagina normal. No vaginal discharge found.   Genitourinary Comments: nefg v/v urethra/urethral meatus w/o lesions or prolapse normal hair distribution intact cuff no adnexal tenderness or fullness absent uterus     Breasts symmetric no masses nipple discharge or overlying skin changes    Musculoskeletal: Normal range of motion. She exhibits edema. She exhibits no tenderness.   Lymphadenopathy:     She has no cervical adenopathy.   Neurological: She is alert and oriented to person, place, and time. She exhibits abnormal muscle tone. Coordination abnormal.   Skin: Skin is warm and dry. No rash noted. No erythema.   Psychiatric: She has a normal mood and affect. Her behavior is normal. Judgment and thought content normal.       Assessment:       1. Pap smear, as part of routine gynecological examination    2. Edema, unspecified type    3. Hypercholesterolemia    4. Screen for colon cancer        Plan:     orders cmp lipid tsh urine cbc  Cont meds  Start hctz  Low fat low salt diet  Graded exercise  F/u neurology  rtc annually and prn     Health  Maintenance  Pap pt request  Breast exam with pap  Lipid ordered  Vaccinations discussed  colonoscopy discussed           "This note will not be shared with " "the patient."   "

## 2017-04-26 LAB — HCV AB SERPL QL IA: NEGATIVE

## 2017-04-27 LAB
BACTERIA UR CULT: NORMAL
BACTERIA UR CULT: NORMAL

## 2017-04-27 NOTE — PROGRESS NOTES
Attestation:  I have personally examined the patient at bedside and reviewed the C-EEG.  I have reviewed and concur with the resident's history and, physical.  Assessment, and plan was made by me after evaluation of all available information .      Justine Hancock MD, MARKUS(), [facility].  Neurology-Epilepsy.

## 2017-04-28 ENCOUNTER — TELEPHONE (OUTPATIENT)
Dept: ENDOSCOPY | Facility: HOSPITAL | Age: 62
End: 2017-04-28

## 2017-04-28 NOTE — TELEPHONE ENCOUNTER
Case request entered for patient to have Colonoscopy.  Patient currently takes Aggrenox, daily.  May this medication be held x 2 days prior to the procedure, as per Endoscopy protocol?  Please advise.

## 2017-05-04 ENCOUNTER — TELEPHONE (OUTPATIENT)
Dept: ENDOSCOPY | Facility: HOSPITAL | Age: 62
End: 2017-05-04

## 2017-05-04 DIAGNOSIS — Z12.11 SPECIAL SCREENING FOR MALIGNANT NEOPLASMS, COLON: Primary | ICD-10-CM

## 2017-05-04 RX ORDER — POLYETHYLENE GLYCOL 3350, SODIUM SULFATE ANHYDROUS, SODIUM BICARBONATE, SODIUM CHLORIDE, POTASSIUM CHLORIDE 236; 22.74; 6.74; 5.86; 2.97 G/4L; G/4L; G/4L; G/4L; G/4L
4 POWDER, FOR SOLUTION ORAL ONCE
Qty: 4000 ML | Refills: 0 | Status: SHIPPED | OUTPATIENT
Start: 2017-05-04 | End: 2017-05-04

## 2017-05-04 NOTE — TELEPHONE ENCOUNTER
Patient is scheduled for Colonoscopy 6/8/2017 with Dr. Capone.  Instructions sent via mail.  Prep used: PEG.    Patient educated on the topic of needing a responsible adult to accompany her, even if she stays at the [facility], the night before the procedure. Patient verbalized understanding.

## 2017-05-11 ENCOUNTER — TELEPHONE (OUTPATIENT)
Dept: FAMILY MEDICINE | Facility: CLINIC | Age: 62
End: 2017-05-11

## 2017-05-11 NOTE — TELEPHONE ENCOUNTER
The patient was informed her labs are fine. She was informed to come into the office to repeat her urine test due to protein spillage.

## 2017-05-11 NOTE — TELEPHONE ENCOUNTER
----- Message from Svetlana Rodriguez MD sent at 4/27/2017  5:53 PM CDT -----  Labs are generally okay but her urine shows protein spillage id like her to repeat it at next visit

## 2017-05-16 DIAGNOSIS — Z12.11 SPECIAL SCREENING FOR MALIGNANT NEOPLASMS, COLON: Primary | ICD-10-CM

## 2017-05-16 RX ORDER — POLYETHYLENE GLYCOL 3350, SODIUM SULFATE ANHYDROUS, SODIUM BICARBONATE, SODIUM CHLORIDE, POTASSIUM CHLORIDE 236; 22.74; 6.74; 5.86; 2.97 G/4L; G/4L; G/4L; G/4L; G/4L
4 POWDER, FOR SOLUTION ORAL ONCE
Qty: 4000 ML | Refills: 0 | Status: SHIPPED | OUTPATIENT
Start: 2017-05-16 | End: 2017-05-16

## 2017-05-23 ENCOUNTER — TELEPHONE (OUTPATIENT)
Dept: FAMILY MEDICINE | Facility: CLINIC | Age: 62
End: 2017-05-23

## 2017-05-23 NOTE — TELEPHONE ENCOUNTER
Patient was informed her pap smear is fine.  Patient stated that she has discomfort and itching around her vaginal area.Patient denies any signs of discharge.She stated she would like something sent to her pharmacy.Please advise.Thanks.

## 2017-06-08 ENCOUNTER — ANESTHESIA EVENT (OUTPATIENT)
Dept: ENDOSCOPY | Facility: HOSPITAL | Age: 62
End: 2017-06-08
Payer: MEDICARE

## 2017-06-08 ENCOUNTER — HOSPITAL ENCOUNTER (OUTPATIENT)
Facility: HOSPITAL | Age: 62
Discharge: HOME | End: 2017-06-08
Attending: COLON & RECTAL SURGERY | Admitting: COLON & RECTAL SURGERY
Payer: MEDICARE

## 2017-06-08 ENCOUNTER — SURGERY (OUTPATIENT)
Age: 62
End: 2017-06-08

## 2017-06-08 ENCOUNTER — ANESTHESIA (OUTPATIENT)
Dept: ENDOSCOPY | Facility: HOSPITAL | Age: 62
End: 2017-06-08
Payer: MEDICARE

## 2017-06-08 VITALS
BODY MASS INDEX: 35.34 KG/M2 | RESPIRATION RATE: 16 BRPM | HEIGHT: 64 IN | SYSTOLIC BLOOD PRESSURE: 124 MMHG | DIASTOLIC BLOOD PRESSURE: 61 MMHG | HEART RATE: 67 BPM | TEMPERATURE: 98 F | WEIGHT: 207 LBS | OXYGEN SATURATION: 100 %

## 2017-06-08 DIAGNOSIS — Z12.11 SCREENING FOR COLON CANCER: ICD-10-CM

## 2017-06-08 PROCEDURE — 25000003 PHARM REV CODE 250: Performed by: NURSE PRACTITIONER

## 2017-06-08 PROCEDURE — 37000008 HC ANESTHESIA 1ST 15 MINUTES: Performed by: COLON & RECTAL SURGERY

## 2017-06-08 PROCEDURE — G0121 COLON CA SCRN NOT HI RSK IND: HCPCS | Mod: ,,, | Performed by: COLON & RECTAL SURGERY

## 2017-06-08 PROCEDURE — 37000009 HC ANESTHESIA EA ADD 15 MINS: Performed by: COLON & RECTAL SURGERY

## 2017-06-08 PROCEDURE — D9220A PRA ANESTHESIA: Mod: 33,ANES,, | Performed by: ANESTHESIOLOGY

## 2017-06-08 PROCEDURE — G0121 COLON CA SCRN NOT HI RSK IND: HCPCS | Performed by: COLON & RECTAL SURGERY

## 2017-06-08 PROCEDURE — G0105 COLORECTAL SCRN; HI RISK IND: HCPCS | Performed by: COLON & RECTAL SURGERY

## 2017-06-08 PROCEDURE — 63600175 PHARM REV CODE 636 W HCPCS: Performed by: NURSE ANESTHETIST, CERTIFIED REGISTERED

## 2017-06-08 PROCEDURE — D9220A PRA ANESTHESIA: Mod: 33,CRNA,, | Performed by: NURSE ANESTHETIST, CERTIFIED REGISTERED

## 2017-06-08 PROCEDURE — 25000003 PHARM REV CODE 250: Performed by: NURSE ANESTHETIST, CERTIFIED REGISTERED

## 2017-06-08 RX ORDER — TOPIRAMATE 200 MG/1
200 TABLET ORAL 2 TIMES DAILY
COMMUNITY
End: 2018-01-08 | Stop reason: SDUPTHER

## 2017-06-08 RX ORDER — SODIUM CHLORIDE 9 MG/ML
INJECTION, SOLUTION INTRAVENOUS CONTINUOUS
Status: DISCONTINUED | OUTPATIENT
Start: 2017-06-08 | End: 2017-06-08 | Stop reason: HOSPADM

## 2017-06-08 RX ORDER — PROPOFOL 10 MG/ML
VIAL (ML) INTRAVENOUS CONTINUOUS PRN
Status: DISCONTINUED | OUTPATIENT
Start: 2017-06-08 | End: 2017-06-08

## 2017-06-08 RX ORDER — PROPOFOL 10 MG/ML
VIAL (ML) INTRAVENOUS
Status: DISCONTINUED | OUTPATIENT
Start: 2017-06-08 | End: 2017-06-08

## 2017-06-08 RX ORDER — LIDOCAINE HCL/PF 100 MG/5ML
SYRINGE (ML) INTRAVENOUS
Status: DISCONTINUED | OUTPATIENT
Start: 2017-06-08 | End: 2017-06-08

## 2017-06-08 RX ADMIN — PROPOFOL 80 MG: 10 INJECTION, EMULSION INTRAVENOUS at 07:06

## 2017-06-08 RX ADMIN — SODIUM CHLORIDE: 0.9 INJECTION, SOLUTION INTRAVENOUS at 07:06

## 2017-06-08 RX ADMIN — PROPOFOL 150 MCG/KG/MIN: 10 INJECTION, EMULSION INTRAVENOUS at 07:06

## 2017-06-08 RX ADMIN — LIDOCAINE HYDROCHLORIDE 40 MG: 20 INJECTION, SOLUTION INTRAVENOUS at 07:06

## 2017-06-08 NOTE — ANESTHESIA PREPROCEDURE EVALUATION
2017  Lacey Montana is a 61 y.o., female.  Pre-operative evaluation for COLONOSCOPY (N/A)    Chief Complaint: colon screen    PMH:  1. Partial epilepsy with simple partial sz predominantly at this point. Approx 3/yr, usually associated with stress, non-compliance, or variable generic drug changes.-last 3 months ago  2. Status post aneurysm repair in remote past (Believed to be related to seizure focus)  3. migraine    Past Surgical History:   Procedure Laterality Date    BRAIN SURGERY  ,     HYSTERECTOMY           Vital Signs Range (Last 24H):  Temp:  [37.1 °C (98.7 °F)]   Pulse:  [90]   Resp:  [12]   BP: (127)/(68)   SpO2:  [98 %]       CBC:   No results for input(s): WBC, RBC, HGB, HCT, PLT, MCV, MCH, MCHC in the last 720 hours.    CMP: No results for input(s): NA, K, CL, CO2, BUN, CREATININE, GLU, MG, PHOS, CALCIUM, ALBUMIN, PROT, ALKPHOS, ALT, AST, BILITOT in the last 720 hours.    INR:  No results for input(s): INR, PROTIME, APTT in the last 720 hours.    Invalid input(s): PT      Diagnostic Studies:      EKD Echo:  Anesthesia Evaluation         Review of Systems      Physical Exam  General:  Obesity    Airway/Jaw/Neck:  Airway Findings: Mouth Opening: Normal Tongue: Normal  General Airway Assessment: Good  Mallampati: III  TM Distance: Normal, at least 6 cm  Jaw/Neck Findings:  Neck ROM: Normal ROM      Dental:  Dental Findings: In tact   Chest/Lungs:  Chest/Lungs Findings: Normal Respiratory Rate     Heart/Vascular:  Heart Findings:       Mental Status:  Mental Status Findings:  Cooperative, Alert and Oriented         Anesthesia Plan  Type of Anesthesia, risks & benefits discussed:  Anesthesia Type:  general  Patient's Preference:   Intra-op Monitoring Plan:   Intra-op Monitoring Plan Comments:   Post Op Pain Control Plan:   Post Op Pain Control Plan Comments:   Induction:    IV  Beta Blocker:  Patient is not currently on a Beta-Blocker (No further documentation required).       Informed Consent: Patient understands risks and agrees with Anesthesia plan.  Questions answered. Anesthesia consent signed with patient.  ASA Score: 3     Day of Surgery Review of History & Physical:    H&P update referred to the surgeon.         Ready For Surgery From Anesthesia Perspective.

## 2017-06-08 NOTE — TRANSFER OF CARE
"Anesthesia Transfer of Care Note    Patient: Lacey Montana    Procedure(s) Performed: Procedure(s) (LRB):  COLONOSCOPY (N/A)    Patient location: PACU    Anesthesia Type: general    Transport from OR: Transported from OR on room air with adequate spontaneous ventilation    Post pain: adequate analgesia    Post assessment: no apparent anesthetic complications    Post vital signs: stable    Level of consciousness: awake    Nausea/Vomiting: no nausea/vomiting    Complications: none    Transfer of care protocol was followed      Last vitals:   Visit Vitals  /68 (BP Location: Left arm, Patient Position: Sitting, BP Method: Automatic)   Pulse 90   Temp 37.1 °C (98.7 °F) (Oral)   Resp 12   Ht 5' 4" (1.626 m)   Wt 93.9 kg (207 lb)   SpO2 98%   Breastfeeding? No   BMI 35.53 kg/m²     "

## 2017-06-08 NOTE — DISCHARGE INSTRUCTIONS
Colonoscopy     A camera attached to a flexible tube with a viewing lens is used to take video pictures.     Colonoscopy is a test to view the inside of your lower digestive tract (colon and rectum). Sometimes it can show the last part of the small intestine (ileum). During the test, small pieces of tissue may be removed for testing. This is called a biopsy. Small growths, such as polyps, may also be removed.   Why is colonoscopy done?  The test is done to help look for colon cancer. And it can help find the source of abdominal pain, bleeding, and changes in bowel habits. It may be needed once a year, depending on factors such as your:  · Age  · Health history  · Family health history  · Symptoms  · Results from any prior colonoscopy  Risks and possible complications  These include:  · Bleeding               · A puncture or tear in the colon   · Risks of anesthesia  · A cancer lesion not being seen  Getting ready   To prepare for the test:  · Talk with your healthcare provider about the risks of the test (see below). Also ask your healthcare provider about alternatives to the test.  · Tell your healthcare provider about any medicines you take. Also tell him or her about any health conditions you may have.  · Make sure your rectum and colon are empty for the test. Follow the diet and bowel prep instructions exactly. If you dont, the test may need to be rescheduled.  · Plan for a friend or family member to drive you home after the test.     Colonoscopy provides an inside view of the entire colon.     You may discuss the results with your doctor right away or at a future visit.  During the test   The test is usually done in the hospital on an outpatient basis. This means you go home the same day. The procedure takes about 30 minutes. During that time:  · You are given relaxing (sedating) medicine through an IV line. You may be drowsy, or fully asleep.  · The healthcare provider will first give you a physical exam to  check for anal and rectal problems.  · Then the anus is lubricated and the scope inserted.  · If you are awake, you may have a feeling similar to needing to have a bowel movement. You may also feel pressure as air is pumped into the colon. Its OK to pass gas during the procedure.  · Biopsy, polyp removal, or other treatments may be done during the test.  After the test   You may have gas right after the test. It can help to try to pass it to help prevent later bloating. Your healthcare provider may discuss the results with you right away. Or you may need to schedule a follow-up visit to talk about the results. After the test, you can go back to your normal eating and other activities. You may be tired from the sedation and need to rest for a few hours.  Date Last Reviewed: 11/1/2016  © 2595-2445 [employer] [address] [place] [address], [place] [address]. All rights reserved. This information is not intended as a substitute for professional medical care. Always follow your healthcare professional's instructions.

## 2017-06-08 NOTE — ANESTHESIA POSTPROCEDURE EVALUATION
"Anesthesia Post Evaluation    Patient: Lacey Montana    Procedure(s) Performed: Procedure(s) (LRB):  COLONOSCOPY (N/A)    Final Anesthesia Type: general  Patient location during evaluation: PACU  Patient participation: Yes- Able to Participate  Level of consciousness: awake and alert and oriented  Post-procedure vital signs: reviewed and stable  Pain management: adequate  Airway patency: patent  PONV status at discharge: No PONV  Anesthetic complications: no      Cardiovascular status: hemodynamically stable  Respiratory status: unassisted  Hydration status: euvolemic  Follow-up not needed.        Visit Vitals  /61 (BP Location: Right leg, Patient Position: Lying, BP Method: Automatic)   Pulse 67   Temp 36.7 °C (98 °F) (Oral)   Resp 16   Ht 5' 4" (1.626 m)   Wt 93.9 kg (207 lb)   SpO2 100%   Breastfeeding? No   BMI 35.53 kg/m²       Pain/Lisa Score: Pain Assessment Performed: Yes (6/8/2017  8:36 AM)  Presence of Pain: denies (6/8/2017  8:36 AM)  Pain Rating Prior to Med Admin: 0 (6/8/2017  7:08 AM)  Pain Rating Post Med Admin: 0 (6/8/2017  6:51 AM)  Lisa Score: 10 (6/8/2017  8:36 AM)      "

## 2017-06-15 ENCOUNTER — TELEPHONE (OUTPATIENT)
Dept: ENDOSCOPY | Facility: HOSPITAL | Age: 62
End: 2017-06-15

## 2017-09-27 ENCOUNTER — TELEPHONE (OUTPATIENT)
Dept: NEUROLOGY | Facility: CLINIC | Age: 62
End: 2017-09-27

## 2017-09-27 NOTE — TELEPHONE ENCOUNTER
----- Message from Ajay Whiting sent at 9/27/2017  2:03 PM CDT -----  Contact: Patient @ [phone]  Patient is calling to get an update on the paper for MANOJ busch pls call

## 2017-10-05 ENCOUNTER — TELEPHONE (OUTPATIENT)
Dept: SLEEP MEDICINE | Facility: CLINIC | Age: 62
End: 2017-10-05

## 2017-10-05 RX ORDER — ASPIRIN AND DIPYRIDAMOLE 25; 200 MG/1; MG/1
1 CAPSULE, EXTENDED RELEASE ORAL 2 TIMES DAILY
Qty: 60 CAPSULE | Refills: 11 | OUTPATIENT
Start: 2017-10-05

## 2017-10-05 NOTE — TELEPHONE ENCOUNTER
----- Message from Michael Rothman sent at 10/5/2017 10:55 AM CDT -----  Contact: [phone]  _ 1st Request  _ 2nd Request  _ 3rd Request    Who: Dev (BLADIMIR)    WWhy: Patient is returnijng  A call    What Number to Call Back: [phone]    When to Expect a call back: (Before the end of the day)  -- if call after 3:00 call back will be tomorrow.

## 2017-10-05 NOTE — TELEPHONE ENCOUNTER
----- Message from Michael Rothman sent at 10/5/2017  9:44 AM CDT -----  Contact: Pt  X_ 1st Request  _ 2nd Request  _ 3rd Request    Who:RODNEY CLIFFORD [[id number]]    Why: Patient would like to speak with staff in regards to getting prescriptions called in; Patient not sure of medication     What Number to Call Back: [phone]    When to Expect a call back: (Before the end of the day)  -- if call after 3:00 call back will be tomorrow.

## 2017-10-11 ENCOUNTER — OFFICE VISIT (OUTPATIENT)
Dept: NEUROLOGY | Facility: CLINIC | Age: 62
End: 2017-10-11
Payer: MEDICARE

## 2017-10-11 VITALS
BODY MASS INDEX: 35.41 KG/M2 | WEIGHT: 207.44 LBS | DIASTOLIC BLOOD PRESSURE: 84 MMHG | SYSTOLIC BLOOD PRESSURE: 124 MMHG | HEART RATE: 74 BPM | HEIGHT: 64 IN

## 2017-10-11 DIAGNOSIS — R29.898 WEAKNESS OF LEFT LOWER EXTREMITY: Primary | ICD-10-CM

## 2017-10-11 DIAGNOSIS — G40.109 PARTIAL SYMPTOMATIC EPILEPSY WITH SIMPLE PARTIAL SEIZURES, NOT INTRACTABLE, WITHOUT STATUS EPILEPTICUS: ICD-10-CM

## 2017-10-11 PROCEDURE — 99214 OFFICE O/P EST MOD 30 MIN: CPT | Mod: GC,S$GLB,, | Performed by: PSYCHIATRY & NEUROLOGY

## 2017-10-11 PROCEDURE — 99999 PR PBB SHADOW E&M-EST. PATIENT-LVL III: CPT | Mod: PBBFAC,GC,, | Performed by: PSYCHIATRY & NEUROLOGY

## 2017-10-11 RX ORDER — PNEUMOCOCCAL VACCINE POLYVALENT 25; 25; 25; 25; 25; 25; 25; 25; 25; 25; 25; 25; 25; 25; 25; 25; 25; 25; 25; 25; 25; 25; 25 UG/.5ML; UG/.5ML; UG/.5ML; UG/.5ML; UG/.5ML; UG/.5ML; UG/.5ML; UG/.5ML; UG/.5ML; UG/.5ML; UG/.5ML; UG/.5ML; UG/.5ML; UG/.5ML; UG/.5ML; UG/.5ML; UG/.5ML; UG/.5ML; UG/.5ML; UG/.5ML; UG/.5ML; UG/.5ML; UG/.5ML
INJECTION, SOLUTION INTRAMUSCULAR; SUBCUTANEOUS
Refills: 0 | COMMUNITY
Start: 2017-08-14

## 2017-10-11 NOTE — PROGRESS NOTES
"Patient Name: Lacey Montana  MRN: [id number]    CC: Seizures, establish care.     Interval Hx- Patient states that she had 2 seizures back to back approximately 2 weeks ago. Semiology similar to past seizure with eye lid flickering, tingling in hands, oral twitching and slurred speech. Lasted approximately a few mins. Did not lose consciousness. Patient states that she thinks she has missed a dose of keppra. Of note, patient did NOT increase keppra to 1.5 g bid as instructed. Patient recently started on diuretic due to LE swelling. States that she has to get up multiple times a night to urinate, and thus is tired.     Topiramate level- 13  Keppra level- 19    HPI 17: Lacey Montana is a 62 y.o. 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 Surgery was done at the [facility] in [place]. [facility] She was started on phenobarbital and another dilantin. Patient used to see Dr. Cici Lozano for seizures and has been on Topamax and Keppra. Patient states she has about 2-3 seizures per year. Seizures precipitated by stress and sleep deprivation.     Last seizure 2 weeks ago on Friday. Prior to that is was 3-4 months ago. Started with eyelid flicker and tingling in hands with watering of mouth. Knew she was going to have a seizure. Mouth started twitching and speech slurring. No urinary incontinence or tongue biting. Patient aware and awake the whole time. States that she had 3 of them back to back. "Each one" lasted 2 mins and stopped. Post-ictally, patient was dysarthric, resolved in a few hours.  Patients brother  1 month ago. Patient does not endorse any missed doses.     Per chart review, patient has had episodes of status, patient denying any overnight stays or ICU admissions " for seizure. Unclear which AEDs brought patient out of status. Patient still having seizures. Serum AED levels ok in 2015.    Seizure Seminology  Seizure Type 1  Classification: Unk  Aura - patient can feel it coming on (tingling in her toes or trickle down the mouth or twitch in her right arm or heart racing, sometimes hears a bell sound)- different symptoms every time  Ictus  - Nonconv - mouth gets dry, throat swelling, she spits   - Conv - mouth begins twitching, pt focuses on trying not to get upset about it, she prays herself through it, awareness is intact, she sometimes cries, left >> right side twitching, No loss of consciousness, tongue biting or incontinence bladder/bowel.  - Duration -  Few minutes, can occur in clusters  Post-ictal  - Symptoms   - Duration  Age of onset   Current Seizure Frequency -  2-3/year   Last Seizure 2 weeks ago      ROS:   Review of Systems   Constitutional: Negative for malaise/fatigue. Negative for weight loss.   HENT: Negative for hearing loss.   Eyes: Negative for blurred vision and double vision.   Respiratory: Negative for shortness of breath and stridor.   Cardiovascular: Negative for chest pain and palpitations.   Gastrointestinal: Negative for nausea, vomiting and constipation.   Genitourinary: Negative for frequency. Negative for urgency.   Musculoskeletal: Negative for joint pain. Negative for myalgias and falls.   Skin: Negative for rash.   Neurological: Negative for dizziness and tremors. Negative for focal weakness and seizures.   Endo/Heme/Allergies: Does not bruise/bleed easily.   Psychiatric/Behavioral: Negative for memory loss. Negative for depression and hallucinations. The patient is not nervous/anxious.      Past Medical History  Past Medical History:   Diagnosis Date    Anticoagulant long-term use     Encounter for blood transfusion     GERD (gastroesophageal reflux disease)     Seizures     Stroke        Medications    Current Outpatient Prescriptions:  "    dipyridamole-aspirin 200-25 mg (AGGRENOX)  mg CM12, Take 1 capsule by mouth 2 (two) times daily., Disp: 60 capsule, Rfl: 11    epinephrine (EPIPEN) 0.3 mg/0.3 mL (1:1,000) AtIn, Inject into the muscle once., Disp: , Rfl:     FLUARIX QUAD 3649-1873, PF, 60 mcg (15 mcg x 4)/0.5 mL vaccine, inject 0.5 milliliter intramuscularly, Disp: , Rfl: 0    hydrochlorothiazide (HYDRODIURIL) 25 MG tablet, Take 1 tablet (25 mg total) by mouth once daily., Disp: 90 tablet, Rfl: 3    levetiracetam (KEPPRA) 1000 MG tablet, Take 1.5 tablets (1,500 mg total) by mouth 2 (two) times daily., Disp: 90 tablet, Rfl: 11    PNEUMOVAX 23 25 mcg/0.5 mL Syrg, inject 0.5 milliliter intramuscularly, Disp: , Rfl: 0    topiramate (TOPAMAX) 200 MG Tab, Take 200 mg by mouth 2 (two) times daily., Disp: , Rfl:     triamcinolone acetonide 0.1% (KENALOG) 0.1 % cream, Apply topically 2 (two) times daily., Disp: 80 g, Rfl: 0    Allergies  Review of patient's allergies indicates:   Allergen Reactions    Chocolate flavor     Codeine     Percocet [oxycodone-acetaminophen]        Social History  Social History     Social History    Marital status: Single     Spouse name: N/A    Number of children: N/A    Years of education: N/A     Occupational History    Not on file.     Social History Main Topics    Smoking status: Former Smoker    Smokeless tobacco: Former User    Alcohol use No      Comment: special occasion    Drug use: Unknown    Sexual activity: Not on file     Other Topics Concern    Not on file     Social History Narrative    No narrative on file       Family History  Family History   Problem Relation Age of Onset    Seizures Daughter 14     *pseudoseizures per patient    Colon cancer Mother 74       Physical Exam  Ht 5' 4" (1.626 m)   Wt 94.1 kg (207 lb 7.3 oz)   BMI 35.61 kg/m²     General appearance: Well-developed, well-groomed.     Neurologic Exam: The patient is awake, alert and oriented. Language is fluent. " Recent and remote memory are normal. Attention span and concentration are normal. Fund of knowledge is appropriate.     Cranial nerves: pupils are round and reactive to light and accommodation. Visual fields are full to confrontation. Fundoscopic exam reveals sharp discs bilaterally, with good venous pulsations. Ocular motility is full in all cardinal positions of gaze. Facial sensation is normal to pinprick and light touch. Corneal reflexes are present bilaterally. Facial activation is symmetric. Hearing is normal bilaterally. Palate elevates symmetrically and gag reflex is intact bilaterally. Shoulder elevation is symmetric and full strength bilaterally. Tongue is midline and neck rotation strength is normal bilaterally. Neck range of motion is normal.     Motor examination of all extremities demonstrates normal bulk and tone in all four limbs. There are no atrophy or fasciculations. Strength is 5/5 in the upper and lower extremities bilaterally without pronator drift.     Sensory examination is normal to pinprick, vibration and proprioception in the upper and lower extremities bilaterally. Romberg is negative.    Deep tendon reflexes are 2+ and symmetric in the upper and lower extremities bilaterally. Toes are mute bilaterally.     Gait: Normal heel, toe, tandem, and casual gait.    Coordination: Finger to nose and heel to shin testing is normal in both upper and lower extremities. Rapid alternating movements are normal in both upper and lower extremities.     General exam  Cardiovascular: regular rate and rhythm with no murmurs, rubs or gallops. There are no carotid or vertebral artery bruits. Pulses in both upper and lower extremities are symmetric. There is no peripheral edema.   Head and neck: no cervical lymphadenopathy      Lab and Test Results    WBC   Date Value Ref Range Status   04/25/2017 3.84 (L) 3.90 - 12.70 K/uL Final   12/08/2015 4.34 3.90 - 12.70 K/uL Final   10/13/2015 3.67 (L) 3.90 - 12.70 K/uL  Final     Hemoglobin   Date Value Ref Range Status   04/25/2017 14.4 12.0 - 16.0 g/dL Final   12/08/2015 13.9 12.0 - 16.0 g/dL Final   10/13/2015 14.5 12.0 - 16.0 g/dL Final     Hematocrit   Date Value Ref Range Status   04/25/2017 44.2 37.0 - 48.5 % Final   12/08/2015 42.9 37.0 - 48.5 % Final   10/13/2015 44.2 37.0 - 48.5 % Final     Platelets   Date Value Ref Range Status   04/25/2017 183 150 - 350 K/uL Final   12/08/2015 170 150 - 350 K/uL Final   10/13/2015 171 150 - 350 K/uL Final     Glucose   Date Value Ref Range Status   04/25/2017 94 70 - 110 mg/dL Final   12/23/2015 109 70 - 110 mg/dL Final   12/08/2015 96 70 - 110 mg/dL Final     Sodium   Date Value Ref Range Status   04/25/2017 143 136 - 145 mmol/L Final   12/23/2015 142 136 - 145 mmol/L Final   12/08/2015 139 136 - 145 mmol/L Final     Potassium   Date Value Ref Range Status   04/25/2017 3.7 3.5 - 5.1 mmol/L Final   12/23/2015 3.7 3.5 - 5.1 mmol/L Final   12/08/2015 3.4 (L) 3.5 - 5.1 mmol/L Final     Chloride   Date Value Ref Range Status   04/25/2017 112 (H) 95 - 110 mmol/L Final   12/23/2015 110 95 - 110 mmol/L Final   12/08/2015 107 95 - 110 mmol/L Final     CO2   Date Value Ref Range Status   04/25/2017 21 (L) 23 - 29 mmol/L Final   12/23/2015 26 23 - 29 mmol/L Final   12/08/2015 22 (L) 23 - 29 mmol/L Final     BUN, Bld   Date Value Ref Range Status   04/25/2017 15 8 - 23 mg/dL Final   12/23/2015 16 6 - 20 mg/dL Final   12/08/2015 15 6 - 20 mg/dL Final     Creatinine   Date Value Ref Range Status   04/25/2017 0.8 0.5 - 1.4 mg/dL Final   12/23/2015 0.8 0.5 - 1.4 mg/dL Final   12/08/2015 0.8 0.5 - 1.4 mg/dL Final     Calcium   Date Value Ref Range Status   04/25/2017 9.2 8.7 - 10.5 mg/dL Final   12/23/2015 9.1 8.7 - 10.5 mg/dL Final   12/08/2015 8.9 8.7 - 10.5 mg/dL Final     Magnesium   Date Value Ref Range Status   10/13/2014 2.1 1.6 - 2.6 mg/dL Final     Phosphorus   Date Value Ref Range Status   10/13/2014 2.7 2.7 - 4.5 mg/dL Final     Alkaline  Phosphatase   Date Value Ref Range Status   04/25/2017 100 55 - 135 U/L Final   12/08/2015 134 55 - 135 U/L Final   10/13/2015 126 55 - 135 U/L Final     ALT   Date Value Ref Range Status   04/25/2017 16 10 - 44 U/L Final   12/08/2015 13 10 - 44 U/L Final   10/13/2015 12 10 - 44 U/L Final     AST   Date Value Ref Range Status   04/25/2017 19 10 - 40 U/L Final   12/08/2015 15 10 - 40 U/L Final   10/13/2015 18 10 - 40 U/L Final         Images:   MRI Brain w wo 2014-    Remote operative change from right frontal temporal parietal craniotomy with moderate right frontal and parietal encephalomalacia again identified.    Ill-defined band of enhancement within the central portion of encephalomalacia again seen are please see prior report for further details.    There is slight asymmetry of the temporal lobes left smaller than the right as identified on the prior study without underlying signal abnormality.  Clinical correlation and correlation with EEG findings is advised.  Independently reviewed     Other Tests  EEG- 2014    Seizure #1 was associated with left facial hemispasms and twitches.  The patient   reported left upper extremity repetitive clenching of the fist during this   episode.  Also, during this episode, the patient had voluntary flapping of the   right upper extremity in order to gain attention from the nurses.  The patient   also had notable trouble speaking but was able to follow all commands.  Towards   the end of the episode, the patient noted accumulation of drool in her mouth,   which she later spit out.     Electrographically, this seizure was characterized by evolving discharges   maximally at CZ and C4.  The activity was low amplitude with evolving frequency   and morphology into a spike and wave discharge.  The amplitude and frequency   waxed and waned and the activity gradually spread to the right temporal channels   and subsequently left parasagittal as well as left posterior regions.  The    activity evolved into a polyspike and wave discharge, maximal in the right   parasagittal as well as central channels.  The offset was characterized by   immediate return to interictal background.     Seizure #2 was characterized by trouble speaking, left facial hemispasms and   twitching.     Electrographically, this seizure, presented similarly to seizure #1.     Seizure #3 was subclinical and occurred while the patient was eating.     Electrographically, this seizure, presented initially in the same manner as   seizure #1, it was limited to the right parasagittal and central channels.     Seizure #4 occurred while the patient was eating.  The patient noted facial   twitching on the left and accumulation of saliva in her mouth, which she later   spit out.  The patient reported that her left upper extremity had repetitive   clenching.  Electrographically, this presented similarly to seizure #1.     Seizure #5 presented clinically and electrographically similar to seizure #1.     Seizure #6 presented clinically and electrographically similar to seizure #1.     Seizure #7 presented clinically and electrographically similar to seizure #1.     FINAL SUMMARY:  ELECTROENCEPHALOGRAM:  Interictal:  This is an abnormal EEG during wakefulness, drowsiness and sleep.    Focal spike and wave discharges were noted maximally at CZ and C4.  Rare sharp   waves were noted maximally at F8 and T4 as well as FZ.  Burst of gamma range   frequencies discharges were noted at FZ.  Vertex waves were asymmetric with the   higher amplitude over the left hemisphere when compared to the right hemisphere.     Ictal:  During this recording, seven of the patient's typical events were   recorded.  Six of these events were associated with clinical symptoms described   above.  One of these events was subclinical.  Electrographically all of the   events were stereotypical with seizures emanating from the central and right   parasagittal region.   Clinically, this correlates with history of right   frontotemporal parietal craniotomy with moderate right frontal and parietal   encephalomalacia.  The seizure onset region likely includes area of   encephalomalacia and/or scar tissue.     MRI Brain w wo 2014-  Remote operative change from right frontal temporal parietal craniotomy with moderate right frontal and parietal encephalomalacia again identified.    Ill-defined band of enhancement within the central portion of encephalomalacia again seen are please see prior report for further details.    There is slight asymmetry of the temporal lobes left smaller than the right as identified on the prior study without underlying signal abnormality.  Clinical correlation and correlation with EEG findings is advised.    Assessment and Plan    Lacey Montana is a 62 y.o. 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 Patient currently taking keppra and topamax. Patient was initially maxed out on topamax and then started on keppra. Will increase keppra and have urged patient compliance with this regimen.       Continue topamax 200 mg bid  Increase Keppra to 1.5g bid (start with 1 pill in am and 1.5 pills at night- if tolerated for 1-2 weeks, then take as prescribed 1.5 g bid)  PMR referral for LE issues and braces/orthotic recs   Consider Psych  Multivitamin  No baths, only showers, no driving  DEXA scan to evaluate for osteopenia/porosis - long term AEDs  Consider alternative antihypertensive as patient continues to get up in the middle of the night to urinate. Patient is a fall risk and states she does not feel safe.     RTC 6 MONTHS    Susan Weber MD  Neurology Resident   [facility] [address] [place] [address]  Pager: [phone]

## 2017-10-12 ENCOUNTER — TELEPHONE (OUTPATIENT)
Dept: FAMILY MEDICINE | Facility: CLINIC | Age: 62
End: 2017-10-12

## 2017-10-12 NOTE — TELEPHONE ENCOUNTER
----- Message from Maria Del Carmen Jorgensen sent at 10/12/2017 11:14 AM CDT -----  Contact: RODNEY CLIFFORD [[id number]]  _x  1st Request  _  2nd Request  _  3rd Request        Who: RODNEY CLIFFORD [[id number]]    Why: Requesting a call back in regards to orders for mammogram     What Number to Call Back: [phone]    When to Expect a call back: (Within 24 hours)    Please return the call at earliest convenience. Thanks!

## 2017-10-13 DIAGNOSIS — Z12.31 ENCOUNTER FOR SCREENING MAMMOGRAM FOR BREAST CANCER: Primary | ICD-10-CM

## 2017-10-20 ENCOUNTER — HOSPITAL ENCOUNTER (OUTPATIENT)
Dept: RADIOLOGY | Facility: HOSPITAL | Age: 62
Discharge: HOME | End: 2017-10-20
Attending: FAMILY MEDICINE
Payer: MEDICARE

## 2017-10-20 ENCOUNTER — OFFICE VISIT (OUTPATIENT)
Dept: PODIATRY | Facility: CLINIC | Age: 62
End: 2017-10-20
Payer: MEDICARE

## 2017-10-20 VITALS
SYSTOLIC BLOOD PRESSURE: 116 MMHG | HEIGHT: 64 IN | BODY MASS INDEX: 36.02 KG/M2 | WEIGHT: 211 LBS | DIASTOLIC BLOOD PRESSURE: 73 MMHG | HEART RATE: 61 BPM

## 2017-10-20 DIAGNOSIS — G57.53 TARSAL TUNNEL SYNDROME, BILATERAL LOWER LIMBS: ICD-10-CM

## 2017-10-20 DIAGNOSIS — M20.21 HALLUX RIGIDUS OF RIGHT FOOT: ICD-10-CM

## 2017-10-20 DIAGNOSIS — M21.372 FOOT DROP, LEFT FOOT: ICD-10-CM

## 2017-10-20 DIAGNOSIS — Z12.31 ENCOUNTER FOR SCREENING MAMMOGRAM FOR BREAST CANCER: ICD-10-CM

## 2017-10-20 DIAGNOSIS — G81.90 HEMIPLEGIA, UNSPECIFIED ETIOLOGY, UNSPECIFIED HEMIPLEGIA LATERALITY, UNSPECIFIED HEMIPLEGIA TYPE: ICD-10-CM

## 2017-10-20 DIAGNOSIS — M72.2 PLANTAR FASCIITIS: Primary | ICD-10-CM

## 2017-10-20 DIAGNOSIS — M21.41 PES PLANUS OF BOTH FEET: ICD-10-CM

## 2017-10-20 DIAGNOSIS — M21.42 PES PLANUS OF BOTH FEET: ICD-10-CM

## 2017-10-20 PROCEDURE — 99203 OFFICE O/P NEW LOW 30 MIN: CPT | Mod: S$GLB,,, | Performed by: PODIATRIST

## 2017-10-20 PROCEDURE — 99499 UNLISTED E&M SERVICE: CPT | Mod: S$GLB,,, | Performed by: PODIATRIST

## 2017-10-20 PROCEDURE — 77067 SCR MAMMO BI INCL CAD: CPT | Mod: 26,,, | Performed by: RADIOLOGY

## 2017-10-20 PROCEDURE — 77067 SCR MAMMO BI INCL CAD: CPT | Mod: TC

## 2017-10-20 PROCEDURE — 99999 PR PBB SHADOW E&M-EST. PATIENT-LVL III: CPT | Mod: PBBFAC,,, | Performed by: PODIATRIST

## 2017-10-20 RX ORDER — METHYLPREDNISOLONE 4 MG/1
TABLET ORAL
Qty: 1 PACKAGE | Refills: 0 | Status: SHIPPED | OUTPATIENT
Start: 2017-10-20 | End: 2017-12-20 | Stop reason: ALTCHOICE

## 2017-10-20 NOTE — PATIENT INSTRUCTIONS
Ankle brace, right side, daily    Supportive shoes    Will consider custom ankle brace as needed.    Consider steroid injection and nerve studies.       1. Stretch calf and plantar fascia 10x per day for 30 sec    2. Supportive shoes at all times (athletic shoe including pires, new balance, asics, HOKA or casual shoes like Dansko, Tabatha, Naot, Vionoic, Fit flop  clog or wedge with extra heel padding and arch support.    (Varsity sports, Phidippides, [place] running company, Masseys, Goodfeet, Cantilever, Feet First, Foot Solutions, Therapeutic shoes, SAS, [employer] [employer] [facility] pro shop) [email or web address]    3. Orthotic (recommend the following brands: Superfeet, Spenco, Powerstep, Sof Sole Fit Series)    4. Medrol dose pack    5. ICE massage with frozen water bottle 2x per day for 30 minutes.    6. Consider night splint, custom orthotics, therapy and/or steroid injection    What Is Plantar Fasciitis?   The plantar fascia is a ligament-like band running from your heel to the ball of your foot. This band pulls on the heel bone, raising the arch of your foot as it pushes off the ground. But if your foot moves incorrectly, the plantar fascia may become strained. The fascia may swell and its tiny fibers may begin to fray, causing plantar fasciitis.  Causes  Plantar fasciitis is often caused by poor foot mechanics. If your foot flattens too much, the fascia may overstretch and swell. If your foot flattens too little, the fascia may ache from being pulled too tight.    The plantar fascia is a thick, fibrous layer of tissue that covers the bones on the bottom of your foot. It holds the foot bones in an arched position. Plantar fasciitis is a painful swelling of the plantar fascia.  A heel spur is an overgrowth of bone where the plantar fascia attaches to the heel bone. The heel spur itself usually doesnt cause pain. However, the heel spur might be a sign of plantar fasciitis which may cause your foot pain.  There is no specific treatment for heel spurs.   Plantar fasciitis can develop slowly or suddenly. It usually affects one foot at a time. Heel pain can feel sharp, like a knife sticking into the bottom of your foot. You may feel pain after exercising, long-distance jogging, stair climbing, long periods of standing, or after standing up.  Risk factors for plantar fasciitis include: arthritis, diabetes, obesity or recent weight gain, flat foot, and having high arches. Wearing high heels, loose shoes, or shoes with poor arch support adds to the risk.    Foot pain is usually worse in the morning. But it improves with walking. By the end of the day there may be a dull aching. Treatment includes short-term rest and controlling inflammation. It may take up to 9 months before all symptoms go away. In rare cases, a steroid injection in the foot, or surgery, may be needed.  Home care  · If you are overweight, lose weight to help healing.  · Choose supportive shoes with good arch support and shock absorbency. Replace athletic shoes when they become worn out. Dont walk or run barefoot.  · Premade or custom-fitted shoe inserts may be helpful. Inserts made of silicone seem to be the most effective. Custom-made inserts can be provided by a podiatrist or foot specialist, physical therapist, or orthopedist.  · Premade or custom-made night splints keep the heel stretched out while you sleep. They may prevent morning pain.  · Avoid activities that stress the feet: jogging, prolonged standing or walking, contact sports, etc.  · First thing in the morning and before sports, stretch the bottom of your foot. Gently flex your ankle so the toes move toward your knee.  · Icing may help control heel pain. Apply an ice pack to the heel for 10-20 minutes as a preventive. Or ice your heel after a severe flare-up of symptoms. You may repeat this every 1-2 hours as needed.  · You may use over-the-counter pain medicine to control pain, unless  another medicine was prescribed. Anti-inflammatory pain medicines, such as ibuprofen or naproxen, may work better than acetaminophen. If you have chronic liver or kidney disease or ever had a stomach ulcer or GI bleeding, talk with your healthcare provider before using these medicines.  · Shoe inserts, a night splint, or a special boot may be needed. Use these as directed by your healthcare provider.      Treating Plantar Fasciitis    First, your doctor relieves pain. Then, the cause of your problem may be found and corrected. If your pain is due to poor foot mechanics, custom-made shoe inserts (orthoses) may help.    Reduce Symptoms:  · To relieve mild symptoms, try aspirin, ibuprofen, or other medications as directed. Rubbing ice on the affected area may also help.  · To reduce severe pain and swelling, your doctor may prescribe pills or injections or a walking cast in some instances. Physical therapy, such as ultrasound or a daily stretching program, may also be recommended. Surgery is rarely required.  · To reduce symptoms caused by poor foot mechanics, your foot may be taped. This supports the arch and temporarily controls movement. Night splints may also help by stretching the fascia.    Control Movement  If taping helps, your doctor may prescribe orthoses. Built from plaster casts of your feet, these inserts control the way your foot moves. As a result, your symptoms should go away.  If Surgery Is Needed  Your doctor may consider surgery if other types of treatment don't control your pain. During surgery, the plantar fascia is partially cut to release tension. As you heal, fibrous tissue fills the space between the heel bone and the plantar fascia.   Reduce Overuse  Every time your foot strikes the ground, the plantar fascia is stretched. You can reduce the strain on the plantar fascia and the possibility of overuse by following these suggestions:  · Lose any excess weight.  · Avoid running on hard or uneven  ground.  · Use orthoses at all times in your shoes and house slippers.  © 1694-1925 [employer]. [address] [place] [address]. All rights reserved. This information is not intended as a substitute for professional medical care. Always follow your healthcare professional's instructions.            Lower Body Exercises: Calf Stretch    This exercise both stretches and strengthens your lower body to help your back. Do the exercise as often as suggested by your health care provider. As you work out, dont rush or strain. Use an exercise mat, pillow, or folded towel to protect your knees and other sensitive areas.  · Face a wall 2 feet away. Step toward the wall with one foot.  · Place both palms on the wall and bend your front knee.  · Lean forward, keeping the back leg straight and the heel on the floor.  · Hold for 20 seconds. Switch legs.  © 1941-7369 [employer]. [address] [place] [address]. All rights reserved. This information is not intended as a substitute for professional medical care. Always follow your healthcare professional's instructions.      These instructions are for your right foot. Switch sides for your left foot.  1. Sit in a chair. Rest your right ankle on your left knee.  2. Hold your toes with your right hand. Gently bend the toes backward. Feel a stretch in the undersides of the toes and ball of the foot. Hold for 30 to 60 seconds.  3. Then gently bend the toes in the other direction. Gently press on them until your foot is pointed. Hold for 30 to 60 seconds.  4. Repeat 5 times, or as instructed.  Date Last Reviewed: 5/1/2016  © 0371-6169 [employer]. [address] [place] [address]. All rights reserved. This information is not intended as a substitute for professional medical care. Always follow your healthcare professional's instructions.

## 2017-10-20 NOTE — PROGRESS NOTES
Subjective:      Patient ID: Lacey Montana is a 62 y.o. female.    Chief Complaint: Foot Problem (bilateral/ PCP Dr. Rodriguez 4/25/17) and Foot Pain    Lacey is a 62 y.o. female who presents to the podiatry clinic  with complaint of  right foot pain. Onset of the symptoms was several months ago. Precipitating event: none known. Current symptoms include: pain at plantar arch, medial ankle and radiating pain up lower leg after walking about 1 block, ability to bear weight, but with some pain and worsening symptoms after a period of activity. Aggravating factors: walking. Symptoms have gradually worsened. Patient has had prior foot problems. Treatment to date: avoidance of offending activity, ice and rest. AFO, left ankle.  Patients rates pain 2/10 on pain scale. 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         Review of Systems   Constitution: Negative for chills, fever, weakness, malaise/fatigue and night sweats.   Cardiovascular: Negative for chest pain, leg swelling, orthopnea and palpitations.   Respiratory: Negative for cough, shortness of breath and wheezing.    Skin: Negative for color change, itching, poor wound healing and rash.   Musculoskeletal: Positive for back pain, joint pain and muscle weakness. Negative for arthritis, gout, joint swelling and myalgias.   Gastrointestinal: Negative for abdominal pain, constipation and nausea.   Neurological: Positive for disturbances in coordination. Negative for dizziness, focal weakness, numbness and tremors.           Objective:      Physical Exam   Constitutional: She is oriented to person, place, and time. Vital signs are normal. She appears well-developed. She is cooperative. No distress.   Cardiovascular: Intact distal pulses.    Pulses:       Dorsalis pedis pulses are 2+ on the right side, and 2+ on the left side.        Posterior tibial pulses are 2+ on the right side, and 2+ on the left side.    Musculoskeletal:        Right ankle: Normal.        Left ankle: Normal.        Right foot: There is decreased range of motion, tenderness and deformity. There is no bony tenderness, normal capillary refill and no crepitus.        Left foot: There is deformity. There is normal range of motion, no bony tenderness, normal capillary refill and no crepitus.   Mild pain on palpation plantar medial and central heel and proximal plantar fascia. No pain with ROM or MMT. No pain with medial and lateral compression of heel.  Mild tenderness at distal PT tendon, right.    Pes planus, partially reducible, bilat.  Nonpainful hallux rigidus, right.  No pain with ankle or STJ, ROM, bilat.       Able to perform double but not single heel rise without pain.       Neurological: She is alert and oriented to person, place, and time. She displays atrophy. No sensory deficit. She exhibits abnormal muscle tone (left ankle). Gait (dropfoot) abnormal.   Reflex Scores:       Achilles reflexes are 2+ on the right side and 1+ on the left side.  Negative Tinels sign,  bilat.   Skin: Skin is intact. Capillary refill takes 2 to 3 seconds. No abrasion, no ecchymosis, no lesion and no rash noted. No erythema. Nails show no clubbing.                  Assessment:       Encounter Diagnoses   Name Primary?    Plantar fasciitis Yes    Foot drop, left foot     Hemiplegia, unspecified etiology, unspecified hemiplegia laterality, unspecified hemiplegia type - Left Foot     Pes planus of both feet     Tarsal tunnel syndrome, bilateral lower limbs     Hallux rigidus of right foot          Plan:       Lacey was seen today for foot problem and foot pain.    Diagnoses and all orders for this visit:    Plantar fasciitis  -     methylPREDNISolone (MEDROL DOSEPACK) 4 mg tablet; Use as instructed on dose pack  -     X-Ray Foot Complete Right; Future    Foot drop, left foot  -     X-Ray Foot Complete Right; Future    Hemiplegia, unspecified etiology,  unspecified hemiplegia laterality, unspecified hemiplegia type - Left Foot  -     X-Ray Foot Complete Right; Future    Pes planus of both feet  -     X-Ray Foot Complete Right; Future    Tarsal tunnel syndrome, bilateral lower limbs  -     methylPREDNISolone (MEDROL DOSEPACK) 4 mg tablet; Use as instructed on dose pack  -     X-Ray Foot Complete Right; Future    Hallux rigidus of right foot  -     X-Ray Foot Complete Right; Future      I counseled the patient on her conditions, their implications and medical management.    1. Stretch calf and plantar fascia as discussed.    2. Supportive shoes at all times. AFO, left. Ankle brace, right.  Dispensed, fitted and gait trained with ankle brace. Instructed to wear with supportive shoe at all times when placing weight on the foot.    3. Orthotic, OTC. Will consider custom as needed.    4. Medrol dose pack    5. ICE massage with frozen water bottle 2x per day for 30 minutes.    6. Will consider steroid injection, custom AFO and/or physical therapy as needed. She declines today.    .

## 2017-10-21 ENCOUNTER — NURSE TRIAGE (OUTPATIENT)
Dept: ADMINISTRATIVE | Facility: CLINIC | Age: 62
End: 2017-10-21

## 2017-10-21 NOTE — TELEPHONE ENCOUNTER
Reason for Disposition   Caller requesting a refill, no triage required, and triager able to refill per unit policy    Protocols used: ST MEDICATION QUESTION CALL-A-AH     (AGGRENOX)  mg refilled called into Pharmacy per triage policy. Patient verbalize understanding.

## 2017-10-23 NOTE — TELEPHONE ENCOUNTER
----- Message from Lilly Paz sent at 10/23/2017  2:08 PM CDT -----  x  1st Request  _  2nd Request  _  3rd Request      Please refill the medication(s) listed below. Please call the patient when the prescription(s) is ready for  at the phone number [phone] .    Medication #1  (AGGRENOX)  mg     Medication #2  epinephrine (EPIPEN) 0.3 mg/0.3 mL (1:1,000)       Preferred Pharmacy:  [facility] #[id number] - [address]

## 2017-10-26 RX ORDER — ASPIRIN AND DIPYRIDAMOLE 25; 200 MG/1; MG/1
1 CAPSULE, EXTENDED RELEASE ORAL 2 TIMES DAILY
Qty: 60 CAPSULE | Refills: 11 | Status: CANCELLED | OUTPATIENT
Start: 2017-10-26

## 2017-10-26 NOTE — TELEPHONE ENCOUNTER
I have spoken to the patient and she stated that she takes aggrenox daily.She stated that the triage nurse only called in a few.Please advise.Thanks.

## 2017-10-27 DIAGNOSIS — I63.239 CEREBRAL INFARCTION DUE TO OCCLUSION OF CAROTID ARTERY, UNSPECIFIED BLOOD VESSEL LATERALITY: ICD-10-CM

## 2017-10-27 DIAGNOSIS — Z86.73 HISTORY OF COMPLETED STROKE: ICD-10-CM

## 2017-10-27 RX ORDER — ASPIRIN AND DIPYRIDAMOLE 25; 200 MG/1; MG/1
1 CAPSULE, EXTENDED RELEASE ORAL 2 TIMES DAILY
Qty: 60 CAPSULE | Refills: 11 | Status: SHIPPED | OUTPATIENT
Start: 2017-10-27

## 2017-10-27 RX ORDER — EPINEPHRINE 0.3 MG/.3ML
1 INJECTION SUBCUTANEOUS ONCE
Qty: 1 EACH | Refills: 3 | Status: SHIPPED | OUTPATIENT
Start: 2017-10-27 | End: 2017-10-27

## 2017-12-05 ENCOUNTER — OFFICE VISIT (OUTPATIENT)
Dept: PODIATRY | Facility: CLINIC | Age: 62
End: 2017-12-05
Payer: MEDICARE

## 2017-12-05 VITALS
BODY MASS INDEX: 36.02 KG/M2 | HEART RATE: 85 BPM | HEIGHT: 64 IN | WEIGHT: 211 LBS | SYSTOLIC BLOOD PRESSURE: 139 MMHG | DIASTOLIC BLOOD PRESSURE: 78 MMHG

## 2017-12-05 DIAGNOSIS — M21.42 PES PLANUS OF BOTH FEET: ICD-10-CM

## 2017-12-05 DIAGNOSIS — M21.41 PES PLANUS OF BOTH FEET: ICD-10-CM

## 2017-12-05 DIAGNOSIS — M20.21 HALLUX RIGIDUS OF RIGHT FOOT: ICD-10-CM

## 2017-12-05 DIAGNOSIS — M72.2 PLANTAR FASCIITIS: Primary | ICD-10-CM

## 2017-12-05 DIAGNOSIS — M21.372 FOOT DROP, LEFT FOOT: ICD-10-CM

## 2017-12-05 DIAGNOSIS — G81.90 HEMIPLEGIA, UNSPECIFIED ETIOLOGY, UNSPECIFIED HEMIPLEGIA LATERALITY, UNSPECIFIED HEMIPLEGIA TYPE: ICD-10-CM

## 2017-12-05 PROCEDURE — 99499 UNLISTED E&M SERVICE: CPT | Mod: S$GLB,,, | Performed by: PODIATRIST

## 2017-12-05 PROCEDURE — 99213 OFFICE O/P EST LOW 20 MIN: CPT | Mod: S$GLB,,, | Performed by: PODIATRIST

## 2017-12-05 PROCEDURE — 99999 PR PBB SHADOW E&M-EST. PATIENT-LVL III: CPT | Mod: PBBFAC,,, | Performed by: PODIATRIST

## 2017-12-05 RX ORDER — DICLOFENAC SODIUM 10 MG/G
2 GEL TOPICAL 2 TIMES DAILY
Qty: 100 G | Refills: 1 | Status: SHIPPED | OUTPATIENT
Start: 2017-12-05 | End: 2018-11-26 | Stop reason: SDUPTHER

## 2017-12-05 NOTE — PATIENT INSTRUCTIONS
1. Stretch calf and plantar fascia 10x per day for 30 sec and before getting out of bed.    2. Supportive shoes at all times (athletic shoe including pires, new balance, asics, HOKA or casual shoes like Dansko, Tabatha, Naot, Vionoic, Fit flop  clog or wedge with extra heel padding and arch support.    (Varsity sports, Phidippides, [place] running company, Masseys, Goodfeet, Cantilever, Feet First, Foot Solutions, Therapeutic shoes, SAS, [facility] Cue[employer] center pro shop) [email or web address]    3. Orthotic (recommend the following brands: Superfeet, Spenco, Powerstep, Sof Sole Fit Series)    4. NSAID's for 2-3 weeks if no GI or Kidney problems (example: ibuprofen 600 mg 2 x per day)    5. ICE massage with frozen water bottle 2x per day for 30 minutes.    6. Consider night splint, custom orthotics, therapy and/or steroid injection    What Is Plantar Fasciitis?   The plantar fascia is a ligament-like band running from your heel to the ball of your foot. This band pulls on the heel bone, raising the arch of your foot as it pushes off the ground. But if your foot moves incorrectly, the plantar fascia may become strained. The fascia may swell and its tiny fibers may begin to fray, causing plantar fasciitis.  Causes  Plantar fasciitis is often caused by poor foot mechanics. If your foot flattens too much, the fascia may overstretch and swell. If your foot flattens too little, the fascia may ache from being pulled too tight.    The plantar fascia is a thick, fibrous layer of tissue that covers the bones on the bottom of your foot. It holds the foot bones in an arched position. Plantar fasciitis is a painful swelling of the plantar fascia.  A heel spur is an overgrowth of bone where the plantar fascia attaches to the heel bone. The heel spur itself usually doesnt cause pain. However, the heel spur might be a sign of plantar fasciitis which may cause your foot pain. There is no specific treatment for heel  spurs.   Plantar fasciitis can develop slowly or suddenly. It usually affects one foot at a time. Heel pain can feel sharp, like a knife sticking into the bottom of your foot. You may feel pain after exercising, long-distance jogging, stair climbing, long periods of standing, or after standing up.  Risk factors for plantar fasciitis include: arthritis, diabetes, obesity or recent weight gain, flat foot, and having high arches. Wearing high heels, loose shoes, or shoes with poor arch support adds to the risk.    Foot pain is usually worse in the morning. But it improves with walking. By the end of the day there may be a dull aching. Treatment includes short-term rest and controlling inflammation. It may take up to 9 months before all symptoms go away. In rare cases, a steroid injection in the foot, or surgery, may be needed.  Home care  · If you are overweight, lose weight to help healing.  · Choose supportive shoes with good arch support and shock absorbency. Replace athletic shoes when they become worn out. Dont walk or run barefoot.  · Premade or custom-fitted shoe inserts may be helpful. Inserts made of silicone seem to be the most effective. Custom-made inserts can be provided by a podiatrist or foot specialist, physical therapist, or orthopedist.  · Premade or custom-made night splints keep the heel stretched out while you sleep. They may prevent morning pain.  · Avoid activities that stress the feet: jogging, prolonged standing or walking, contact sports, etc.  · First thing in the morning and before sports, stretch the bottom of your foot. Gently flex your ankle so the toes move toward your knee.  · Icing may help control heel pain. Apply an ice pack to the heel for 10-20 minutes as a preventive. Or ice your heel after a severe flare-up of symptoms. You may repeat this every 1-2 hours as needed.  · You may use over-the-counter pain medicine to control pain, unless another medicine was  prescribed. Anti-inflammatory pain medicines, such as ibuprofen or naproxen, may work better than acetaminophen. If you have chronic liver or kidney disease or ever had a stomach ulcer or GI bleeding, talk with your healthcare provider before using these medicines.  · Shoe inserts, a night splint, or a special boot may be needed. Use these as directed by your healthcare provider.      Treating Plantar Fasciitis    First, your doctor relieves pain. Then, the cause of your problem may be found and corrected. If your pain is due to poor foot mechanics, custom-made shoe inserts (orthoses) may help.    Reduce Symptoms:  · To relieve mild symptoms, try aspirin, ibuprofen, or other medications as directed. Rubbing ice on the affected area may also help.  · To reduce severe pain and swelling, your doctor may prescribe pills or injections or a walking cast in some instances. Physical therapy, such as ultrasound or a daily stretching program, may also be recommended. Surgery is rarely required.  · To reduce symptoms caused by poor foot mechanics, your foot may be taped. This supports the arch and temporarily controls movement. Night splints may also help by stretching the fascia.    Control Movement  If taping helps, your doctor may prescribe orthoses. Built from plaster casts of your feet, these inserts control the way your foot moves. As a result, your symptoms should go away.  If Surgery Is Needed  Your doctor may consider surgery if other types of treatment don't control your pain. During surgery, the plantar fascia is partially cut to release tension. As you heal, fibrous tissue fills the space between the heel bone and the plantar fascia.   Reduce Overuse  Every time your foot strikes the ground, the plantar fascia is stretched. You can reduce the strain on the plantar fascia and the possibility of overuse by following these suggestions:  · Lose any excess weight.  · Avoid running on hard or uneven ground.  · Use  orthoses at all times in your shoes and house slippers.  © 7440-7203 [employer]. [address] [place] [address]. All rights reserved. This information is not intended as a substitute for professional medical care. Always follow your healthcare professional's instructions.            Lower Body Exercises: Calf Stretch    This exercise both stretches and strengthens your lower body to help your back. Do the exercise as often as suggested by your health care provider. As you work out, dont rush or strain. Use an exercise mat, pillow, or folded towel to protect your knees and other sensitive areas.  · Face a wall 2 feet away. Step toward the wall with one foot.  · Place both palms on the wall and bend your front knee.  · Lean forward, keeping the back leg straight and the heel on the floor.  · Hold for 20 seconds. Switch legs.  © 6121-6621 [employer]. [address] [place] [address]. All rights reserved. This information is not intended as a substitute for professional medical care. Always follow your healthcare professional's instructions.      These instructions are for your right foot. Switch sides for your left foot.  1. Sit in a chair. Rest your right ankle on your left knee.  2. Hold your toes with your right hand. Gently bend the toes backward. Feel a stretch in the undersides of the toes and ball of the foot. Hold for 30 to 60 seconds.  3. Then gently bend the toes in the other direction. Gently press on them until your foot is pointed. Hold for 30 to 60 seconds.  4. Repeat 5 times, or as instructed.  Date Last Reviewed: 5/1/2016  © 6982-2574 [employer]. [address] [place] [address]. All rights reserved. This information is not intended as a substitute for professional medical care. Always follow your healthcare professional's instructions.

## 2017-12-05 NOTE — PROGRESS NOTES
Subjective:      Patient ID: Lacey Montana is a 62 y.o. female.    Chief Complaint: Plantar Fasciitis (bilateral rt foot mostly )    Lacey is a 62 y.o. female who presents to the podiatry clinic  with complaint of  right foot pain. Onset of the symptoms was several months ago. Precipitating event: none known. Current symptoms include: pain at plantar arch, medial ankle and radiating pain up lower leg after walking about 1 block, ability to bear weight, but with some pain and worsening symptoms after a period of activity. Aggravating factors: walking. Symptoms have gradually improved since last visit. Patient has had prior foot problems. Treatment to date: avoidance of offending activity, ice and rest. Medrol dose pack. AFO, left ankle.  Patients rates pain 2/10 on pain scale. 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     Review of Systems   Constitution: Negative for chills, fever, weakness, malaise/fatigue and night sweats.   Cardiovascular: Negative for chest pain, leg swelling, orthopnea and palpitations.   Respiratory: Negative for cough, shortness of breath and wheezing.    Skin: Negative for color change, itching, poor wound healing and rash.   Musculoskeletal: Positive for back pain, joint pain and muscle weakness. Negative for arthritis, gout, joint swelling and myalgias.   Gastrointestinal: Negative for abdominal pain, constipation and nausea.   Neurological: Positive for disturbances in coordination. Negative for dizziness, focal weakness, numbness and tremors.           Objective:      Physical Exam   Constitutional: She is oriented to person, place, and time. Vital signs are normal. She appears well-developed. She is cooperative. No distress.   Cardiovascular: Intact distal pulses.    Pulses:       Dorsalis pedis pulses are 2+ on the right side, and 2+ on the left side.        Posterior tibial pulses are 2+ on the right side, and 2+ on the left side.    Musculoskeletal:        Right ankle: Normal.        Left ankle: Normal.        Right foot: There is decreased range of motion, tenderness and deformity. There is no bony tenderness, normal capillary refill and no crepitus.        Left foot: There is deformity. There is normal range of motion, no bony tenderness, normal capillary refill and no crepitus.   Mild pain on palpation plantar medial and central heel and proximal plantar fascia. No pain with ROM or MMT. No pain with medial and lateral compression of heel.  Mild tenderness at distal PT tendon, right.    Pes planus, partially reducible, bilat.  Nonpainful hallux rigidus, right.  No pain with ankle or STJ, ROM, bilat.       Able to perform double but not single heel rise without pain.       Neurological: She is alert and oriented to person, place, and time. She displays atrophy. No sensory deficit. She exhibits abnormal muscle tone (left ankle). Gait (dropfoot) abnormal.   Reflex Scores:       Achilles reflexes are 2+ on the right side and 1+ on the left side.  Negative Tinels sign,  bilat.   Skin: Skin is intact. Capillary refill takes 2 to 3 seconds. No abrasion, no ecchymosis, no lesion and no rash noted. No erythema. Nails show no clubbing.                  Assessment:       Encounter Diagnoses   Name Primary?    Plantar fasciitis Yes    Foot drop, left foot     Hemiplegia, unspecified etiology, unspecified hemiplegia laterality, unspecified hemiplegia type - Left Foot     Pes planus of both feet     Hallux rigidus of right foot          Plan:       Lacey was seen today for plantar fasciitis.    Diagnoses and all orders for this visit:    Plantar fasciitis  -     diclofenac sodium (VOLTAREN) 1 % Gel; Apply 2 g topically 2 (two) times daily.    Foot drop, left foot    Hemiplegia, unspecified etiology, unspecified hemiplegia laterality, unspecified hemiplegia type - Left Foot    Pes planus of both feet    Hallux rigidus of right foot  -      diclofenac sodium (VOLTAREN) 1 % Gel; Apply 2 g topically 2 (two) times daily.      I counseled the patient on her conditions, their implications and medical management.    1. Stretch calf and plantar fascia as discussed.    2. Supportive shoes at all times. AFO, left. Ankle brace, right.  Dispensed, fitted and gait trained with ankle brace. Instructed to wear with supportive shoe at all times when placing weight on the foot.    3. Orthotic, OTC. Will consider custom as needed.    4. Voltaren gel BID    5. ICE massage with frozen water bottle 2x per day for 30 minutes.    6. Will consider steroid injection, custom AFO and/or physical therapy as needed. She declines today.    .

## 2017-12-20 ENCOUNTER — OFFICE VISIT (OUTPATIENT)
Dept: PHYSICAL MEDICINE AND REHAB | Facility: CLINIC | Age: 62
End: 2017-12-20
Payer: MEDICARE

## 2017-12-20 ENCOUNTER — OFFICE VISIT (OUTPATIENT)
Dept: OPTOMETRY | Facility: CLINIC | Age: 62
End: 2017-12-20
Payer: MEDICARE

## 2017-12-20 VITALS
SYSTOLIC BLOOD PRESSURE: 136 MMHG | DIASTOLIC BLOOD PRESSURE: 83 MMHG | HEART RATE: 78 BPM | WEIGHT: 207.25 LBS | BODY MASS INDEX: 34.53 KG/M2 | HEIGHT: 65 IN

## 2017-12-20 DIAGNOSIS — H10.13 CONJUNCTIVITIS, ALLERGIC, BILATERAL: ICD-10-CM

## 2017-12-20 DIAGNOSIS — G81.94 LEFT HEMIPARESIS: ICD-10-CM

## 2017-12-20 DIAGNOSIS — H52.13 MYOPIA, BILATERAL: ICD-10-CM

## 2017-12-20 DIAGNOSIS — H25.13 NS (NUCLEAR SCLEROSIS), BILATERAL: ICD-10-CM

## 2017-12-20 DIAGNOSIS — M54.41 CHRONIC MIDLINE LOW BACK PAIN WITH RIGHT-SIDED SCIATICA: ICD-10-CM

## 2017-12-20 DIAGNOSIS — R26.9 GAIT DISORDER: Primary | ICD-10-CM

## 2017-12-20 DIAGNOSIS — M21.372 LEFT FOOT DROP: ICD-10-CM

## 2017-12-20 DIAGNOSIS — Z86.73 HISTORY OF COMPLETED STROKE: ICD-10-CM

## 2017-12-20 DIAGNOSIS — H04.123 DRY EYE SYNDROME, BILATERAL: Primary | ICD-10-CM

## 2017-12-20 DIAGNOSIS — G89.29 CHRONIC MIDLINE LOW BACK PAIN WITH RIGHT-SIDED SCIATICA: ICD-10-CM

## 2017-12-20 PROCEDURE — 99999 PR PBB SHADOW E&M-EST. PATIENT-LVL III: CPT | Mod: PBBFAC,,, | Performed by: PHYSICAL MEDICINE & REHABILITATION

## 2017-12-20 PROCEDURE — 92004 COMPRE OPH EXAM NEW PT 1/>: CPT | Mod: S$GLB,,, | Performed by: OPTOMETRIST

## 2017-12-20 PROCEDURE — 99999 PR PBB SHADOW E&M-EST. PATIENT-LVL II: CPT | Mod: PBBFAC,,, | Performed by: OPTOMETRIST

## 2017-12-20 PROCEDURE — 99203 OFFICE O/P NEW LOW 30 MIN: CPT | Mod: S$GLB,,, | Performed by: PHYSICAL MEDICINE & REHABILITATION

## 2017-12-20 PROCEDURE — 92015 DETERMINE REFRACTIVE STATE: CPT | Mod: S$GLB,,, | Performed by: OPTOMETRIST

## 2017-12-20 PROCEDURE — 99499 UNLISTED E&M SERVICE: CPT | Mod: S$GLB,,, | Performed by: PHYSICAL MEDICINE & REHABILITATION

## 2017-12-20 NOTE — PROGRESS NOTES
HPI     Patient's age: 62 y.o.    Approximate date of last eye examination:  1 yr ago  Name of last eye doctor seen: [facility]    Pt states that she is here for exam, eyes been feeling like they got trash   in them started using over the counter drop refresh, which helped some but   still feel like that so decided to wait for appointment before buying   more.    Wears glasses? Yes, broke      Wears CLs?:  no            Headaches?  no  Eye pain/discomfort?  no                                                                                     Flashes?  no  Floaters?  no  Diplopia/Double vision?  sometimes    Patient's Ocular History:         Any eye surgeries? Had eyes scraped, was told needed operation for   glaucoma but never had done         Family history of eye disease?  no    Significant patient medical history:         1. Diabetes?  borderline       If yes, IDDM or NIDDM? no   2. HBP?  yes                ! OTC eyedrops currently using:  Refresh - OU PRN   ! Prescription eye meds currently using:  none        Last edited by Deidre Matthews MA on 12/20/2017  2:16 PM. (History)            Assessment /Plan     For exam results, see Encounter Report.    Dry eye syndrome, bilateral   Refresh liquigel 2-4 times per day in both eyes       Conjunctivitis, allergic, bilateral    Zaditor BID OU    NS (nuclear sclerosis), bilateral   Mild, not visually significant, monitor     Myopia, bilateral   Rx specs    Good internal ocular health, monitor yearly    RTC 1 year

## 2017-12-20 NOTE — PROGRESS NOTES
Subjective:       Patient ID: Lacey Montana is a 62 y.o. female.    Chief Complaint: No chief complaint on file.    HPI     Mrs. Montana is a 62-year-old black female who is presenting to the Physical   Medicine Clinic for prescription of adaptive devices.  Her past medical history   is significant for hypertension, stroke with left hemiparesis in 2003, with   residual left foot drop, and chronic low back pain with right lumbar   radiculopathy.  The patient lives alone in a single-shad home.  She is   independent with feeding and dressing herself.  She is independent with   toileting, but she has trouble sitting or rising up from the toilet seat.  She   is independent with showering with a walk-in shower and a shower chair.  She can   ambulate holding onto walls and furniture.  She has a history of near falls,   but has not fallen.  She is looking for a durable medical equipment to assist   with her mobility.  She has not had a course of physical therapy for over a   couple of years.      MS/HN  dd: 12/20/2017 16:39:06 (CST)  td: 12/21/2017 11:53:39 (CST)  Doc ID   #[id number]  Job ID #[id number]    CC:       Review of Systems   Constitutional: Negative for fatigue.   Eyes: Negative for visual disturbance.   Respiratory: Negative for shortness of breath.    Cardiovascular: Negative for chest pain.   Gastrointestinal: Negative for nausea and vomiting.   Genitourinary: Negative for difficulty urinating.   Musculoskeletal: Positive for arthralgias (shoulders) and gait problem. Negative for back pain and neck pain.   Neurological: Negative for dizziness and headaches.   Psychiatric/Behavioral: Negative for behavioral problems.       Objective:      Physical Exam   Constitutional: She is oriented to person, place, and time. She appears well-developed and well-nourished. No distress.   HENT:   Head: Normocephalic and atraumatic.   Neck: Normal range of motion.   Cardiovascular: Normal rate, regular rhythm and normal heart  sounds.    Pulmonary/Chest: Effort normal and breath sounds normal.   Abdominal: Soft.   Musculoskeletal:   BUE:  Increased tone in LUE.  Strength:    RUE: 5/5 at shoulder abduction, 5 elbow flexion, 5 elbow extension, 5 hand .   LUE: 2/5 at shoulder abduction, 3 elbow flexion, 3 elbow extension, 3+ hand .  Sensation to pinprick:   RUE: intact.   LUE: intact.  DTR:    RUE: +1 biceps, +1 triceps.   LUE:  +3 biceps, +3 triceps.      BLE:  Increased tone in LLE.  + ve bilateral knee crepitus.   Wearing Lt AFO.  Strength:    RLE: 5/5 at hip flexion, 5 knee extension, 5 ankle DF/PF.   LLE: 2-/5 at hip flexion, 4 knee extension, 3 ankle DF/PF.  Sensation to pinprick:     RLE: intact.      LLE: intact.   DTR:     RLE: +1 knee, +1 ankle.    LLE: +3 knee, +3 ankle.  SLR:      RLE: -ve.      LLE: -ve.     -ve tenderness over lumbar spine.    Gait: slow sammy, spastic, some LLE shuffling, wearing a Lt AFO.   Neurological: She is alert and oriented to person, place, and time.   Skin: Skin is warm.   Psychiatric: She has a normal mood and affect.   Vitals reviewed.      Assessment:       1. Gait disorder    2. History of completed stroke    3. Left hemiparesis    4. Left foot drop    5. Chronic midline low back pain with right-sided sciatica        Summary/Plan:       - A prescription for a straight cane and a rollator walker was given to the patient to help improve gait safety.  - The patient was also given a prescription for a Versamode to improve safety of toileting.  - Ambulatory Referral to Physical Therapy  - Return in about 4 months (around 4/20/2018).

## 2017-12-27 NOTE — PROGRESS NOTES
Attestation:  I have personally taken the history and examined this patient and concur with the resident's note as stated above.   Assessment, and plan was made by me after evaluation of all available information .    Justine Hancock MD, [facility](), [facility].  Neurology-Epilepsy.

## 2018-01-08 RX ORDER — TOPIRAMATE 200 MG/1
TABLET ORAL
Qty: 180 TABLET | Refills: 3 | Status: SHIPPED | OUTPATIENT
Start: 2018-01-08

## 2018-01-08 RX ORDER — TOPIRAMATE 200 MG/1
200 TABLET ORAL 2 TIMES DAILY
Qty: 180 TABLET | Refills: 3 | Status: SHIPPED | OUTPATIENT
Start: 2018-01-08

## 2018-01-08 NOTE — TELEPHONE ENCOUNTER
----- Message from Iram Mcintosh sent at 1/8/2018  1:12 PM CST -----  Contact: pt      _  1st Request  _  2nd Request  _  3rd Request    Please refill the medication(s) listed below. Please call the patient when the prescription(s) is ready for  at this phone number      [phone]      Medication #1topiramate (TOPAMAX) 200 MG Tab     Medication #2      Preferred Pharmacy:[facility] on [address] [facility]

## 2018-04-10 ENCOUNTER — PES CALL (OUTPATIENT)
Dept: ADMINISTRATIVE | Facility: CLINIC | Age: 63
End: 2018-04-10

## 2018-04-20 ENCOUNTER — OFFICE VISIT (OUTPATIENT)
Dept: PHYSICAL MEDICINE AND REHAB | Facility: CLINIC | Age: 63
End: 2018-04-20
Payer: MEDICARE

## 2018-04-20 VITALS
HEIGHT: 65 IN | WEIGHT: 215.75 LBS | SYSTOLIC BLOOD PRESSURE: 140 MMHG | HEART RATE: 67 BPM | BODY MASS INDEX: 35.94 KG/M2 | DIASTOLIC BLOOD PRESSURE: 82 MMHG

## 2018-04-20 DIAGNOSIS — G89.29 CHRONIC MIDLINE LOW BACK PAIN WITH RIGHT-SIDED SCIATICA: ICD-10-CM

## 2018-04-20 DIAGNOSIS — R26.9 GAIT DISORDER: ICD-10-CM

## 2018-04-20 DIAGNOSIS — G81.94 LEFT HEMIPARESIS: ICD-10-CM

## 2018-04-20 DIAGNOSIS — Z86.73 HISTORY OF COMPLETED STROKE: Primary | ICD-10-CM

## 2018-04-20 DIAGNOSIS — M21.372 LEFT FOOT DROP: ICD-10-CM

## 2018-04-20 DIAGNOSIS — M54.41 CHRONIC MIDLINE LOW BACK PAIN WITH RIGHT-SIDED SCIATICA: ICD-10-CM

## 2018-04-20 PROCEDURE — 99214 OFFICE O/P EST MOD 30 MIN: CPT | Mod: S$GLB,,, | Performed by: PHYSICAL MEDICINE & REHABILITATION

## 2018-04-20 PROCEDURE — 99499 UNLISTED E&M SERVICE: CPT | Mod: S$PBB,,, | Performed by: PHYSICAL MEDICINE & REHABILITATION

## 2018-04-20 PROCEDURE — 99999 PR PBB SHADOW E&M-EST. PATIENT-LVL III: CPT | Mod: PBBFAC,,, | Performed by: PHYSICAL MEDICINE & REHABILITATION

## 2018-04-20 NOTE — PROGRESS NOTES
Subjective:       Patient ID: Lacey Montana is a 62 y.o. female.    Chief Complaint: No chief complaint on file.    HPI     Mrs. Montana is a 62-year-old black female with past medical history of HTN,   stroke with left hemiparesis in 2003, with residual left foot drop and chronic   low back pain with right lumbar radiculopathy.  She presented [facility] the [facility] on 12/20/2017, for prescriptions of physical therapy and medical   equipment.  She was given prescriptions for a Rollator walker and ____.  She   was referred to Physical Therapy.    The patient comes today to the clinic for followup.  She reports she was not   contacted by Physical Therapy to arrange for her treatment sessions.  She did   not have the chance either to take the prescriptions for the durable medical   equipment through the store.  She has been medically stable since her last   visit.  Her mobility has not changed.  She continues to live in a single shad   home.  She is independent with feeding, dressing, toileting and showering, but   it takes her a long time.  She has been ambulating holding on to walls or   furniture or to her small shopping cart.  She denies any falls.      MS/IN  dd: 04/20/2018 11:09:33 (CDT)  td: 04/21/2018 02:00:26 (CDT)  Doc ID   #[id number]  Job ID #[id number]    CC:           Review of Systems   Eyes: Negative for visual disturbance.   Respiratory: Negative for shortness of breath.    Cardiovascular: Negative for chest pain.   Gastrointestinal: Positive for nausea. Negative for constipation and vomiting.   Genitourinary: Negative for difficulty urinating.   Musculoskeletal: Positive for arthralgias (shoulders), gait problem and neck pain. Negative for back pain.   Skin: Positive for rash.   Neurological: Positive for headaches. Negative for dizziness.   Psychiatric/Behavioral: Negative for behavioral problems.       Objective:      Physical Exam   Constitutional: She is oriented to person, place, and  time. She appears well-developed and well-nourished. No distress.   HENT:   Head: Normocephalic and atraumatic.   Neck: Normal range of motion.   Musculoskeletal:   BUE:  Increased tone in LUE.  Strength:    RUE: 5/5 at shoulder abduction, 5 elbow flexion, 5 elbow extension, 5 hand .   LUE: 2/5 at shoulder abduction, 3 elbow flexion, 3 elbow extension, 3+ hand .  Sensation to pinprick:   RUE: intact.   LUE: intact.        BLE:  Increased tone in LLE.  + ve bilateral knee crepitus.   Wearing Lt AFO.  Strength:    RLE: 5/5 at hip flexion, 5 knee extension, 5 ankle DF/PF.   LLE: 2-/5 at hip flexion, 4 knee extension, 3 ankle DF/PF.  Sensation to pinprick:     RLE: intact.      LLE: intact.         Gait: slow sammy, spastic, some LLE shuffling, wearing a Lt AFO.   Neurological: She is alert and oriented to person, place, and time.   Skin: Skin is warm.   Psychiatric: She has a normal mood and affect.   Vitals reviewed.      Assessment:       1. History of completed stroke    2. Left hemiparesis    3. Left foot drop    4. Chronic midline low back pain with right-sided sciatica    5. Gait disorder        Summary/Plan:       - A prescription for a rollator walker was given to the patient to help improve gait safety.  - She was also given a prescription for a Versamode to improve safety of toileting.  - Ambulatory Referral to Physical Therapy (at [facility]).  - Follow-up in about 4 months (around 8/20/2018).     This was a 25 minute visit, more than 50% of which was spent counseling the patient about the diagnosis and the treatment plan.

## 2018-05-01 ENCOUNTER — CLINICAL SUPPORT (OUTPATIENT)
Dept: REHABILITATION | Facility: HOSPITAL | Age: 63
End: 2018-05-01
Attending: PHYSICAL MEDICINE & REHABILITATION
Payer: MEDICARE

## 2018-05-01 DIAGNOSIS — Z74.09 IMPAIRED FUNCTIONAL MOBILITY, BALANCE, GAIT, AND ENDURANCE: ICD-10-CM

## 2018-05-01 DIAGNOSIS — M25.60 DECREASED RANGE OF MOTION: ICD-10-CM

## 2018-05-01 DIAGNOSIS — Z78.9 IMPAIRED MOTOR CONTROL: ICD-10-CM

## 2018-05-01 PROCEDURE — G8978 MOBILITY CURRENT STATUS: HCPCS | Mod: CJ,PO | Performed by: PHYSICAL THERAPIST

## 2018-05-01 PROCEDURE — G8979 MOBILITY GOAL STATUS: HCPCS | Mod: CI,PO | Performed by: PHYSICAL THERAPIST

## 2018-05-01 PROCEDURE — 97162 PT EVAL MOD COMPLEX 30 MIN: CPT | Mod: PO | Performed by: PHYSICAL THERAPIST

## 2018-05-03 PROBLEM — Z74.09 IMPAIRED FUNCTIONAL MOBILITY, BALANCE, GAIT, AND ENDURANCE: Status: ACTIVE | Noted: 2018-05-03

## 2018-05-03 PROBLEM — Z78.9 IMPAIRED MOTOR CONTROL: Status: ACTIVE | Noted: 2018-05-03

## 2018-05-03 PROBLEM — M25.60 DECREASED RANGE OF MOTION: Status: ACTIVE | Noted: 2018-05-03

## 2018-05-04 ENCOUNTER — DOCUMENTATION ONLY (OUTPATIENT)
Dept: REHABILITATION | Facility: HOSPITAL | Age: 63
End: 2018-05-04

## 2018-05-04 ENCOUNTER — CLINICAL SUPPORT (OUTPATIENT)
Dept: REHABILITATION | Facility: HOSPITAL | Age: 63
End: 2018-05-04
Attending: PHYSICAL MEDICINE & REHABILITATION
Payer: MEDICARE

## 2018-05-04 DIAGNOSIS — Z74.09 IMPAIRED FUNCTIONAL MOBILITY, BALANCE, GAIT, AND ENDURANCE: ICD-10-CM

## 2018-05-04 DIAGNOSIS — Z78.9 IMPAIRED MOTOR CONTROL: ICD-10-CM

## 2018-05-04 PROCEDURE — 97110 THERAPEUTIC EXERCISES: CPT | Mod: PO

## 2018-05-04 PROCEDURE — 97112 NEUROMUSCULAR REEDUCATION: CPT | Mod: PO

## 2018-05-04 NOTE — PROGRESS NOTES
I, Monica Mosley, DPT, met with Nic Bazan PTA, to discuss this pt's POC. Reviewed pt's POC and stretching of pelvic tilts. Use rolator or SBQC for gait training. Perform basic strengthening, stretch L DF, L hip into IR. Stretch DF on wedge, pt's foot is fully pronated, correct calcaneal IV while stretching. Pt may use BAPS in sitting to elicit improved DF.     Monica Mosley DPT  5/4/2018

## 2018-05-04 NOTE — PROGRESS NOTES
Physical Therapy Progress Note     Name: Lacey Montana  Clinic Number: [id number]  Diagnosis:   Encounter Diagnoses   Name Primary?    Impaired functional mobility, balance, gait, and endurance     Impaired motor control      Physician: Juan Pablo Anaya MD  Treatment Orders: PT Evaluation and Treatment  Past Medical History:   Diagnosis Date    Anticoagulant long-term use     Encounter for blood transfusion     GERD (gastroesophageal reflux disease)     Seizures     Stroke        Precautions: universal  Visit #: 2  Date of Eval: 5/01/18  Plan of Care Expiration: 6/12/18    G codes 2/10     On ST caseload?No         Subjective   Pt reports: that her R foot starts hurting after about 2 blocks.  Pain Scale:  denies    Objective     Patient received individual therapy with activities as follows:     EOM:  Lateral hip rotation in both directions with wedge under L glut sitting  Scapular retractions 2 x 15 with 5 sec holds     Supine:  PPT with TA bracing 2 x 15 with 5 sec holds    Standing:  Fwd/bck and lateral weight shifting in // bars with verbal/tactile cues for proper L foot placement   Ambulation with SBQC 2 trials of ~40 ft       Written Home Exercises:   Pt demo good understanding of the education provided. Lacey demonstrated good return demonstration of activities.     Education provided re: POC, HEP  No spiritual or educational barriers to learning provided    Pt has no cultural, educational or language barriers to learning provided.    Assessment   Pt demonstrated general hip stiffness with significant tight L hip external rotators. Tx focused on posture correction, hip flexibility and pre gait activities.    Pt could benefit from introducing external hip rotation stretching during the next tx visit. Will progress as tolerated.         GOALS:   Short term goals: 3-4 weeks, pt agrees to goals set.  1. Pt will perform HEP for basic strengthening  and ROM with supervision to improve carryover of progress.   2. Pt will demonstrate improved L DF to 4 degrees to improve foot clearance to decreased LLE ER during gait.   3. Pt will demonstrate improved gait and mobility by demonstrating TUG with SBQC in 15 sec.   4. Pt will demonstrate improved motor control and use of LLE for mobility by performing 5 times sit<>stand test in 10 sec with UE use as needed.   5. Pt will demonstrate improved mobility/ gait and decreased fall risk from high to moderate by scoring 19/28 on Tinetti Assessment.   6. Make appropriate recommendations for DME as needed.      Long term goals: 6 weeks, pt agrees to goals set  7. Pt will demonstrate improved mobility and decreased fall risk by performing TUG with appropriate AD in 12 sec.   8. Pt will demonstrate improved L DF to 10 degrees to demonstrate improved resting position/ mobility of foot/ ankle and improve foot clearance during gait.   9. Pt will demonstrate improved mobility/ gait by scoring 22/28 on Tinetti Assessment.   10. Pt will ambulate with appropriate AD for 150 ft without significant ER of LLE to demonstrate improved gait quality and reduce abnormal motions which may cause a secondary impairment of LBP.   11. Pt will demonstrate improved gait velocity to 1.14 m/s with appropraite AD to demonstrate a 25% improvement in gait.      Plan   Continue PT 2x weekly under established Plan of Care, with treatment to include: pt education, HEP, therapeutic exercises, neuromuscular re-education/balance exercises, therapeutic activities, joint mobilizations, and modalities PRN, to work towards established goals. Pt may be seen by PTA to carry out plan of care.     Nic Weiss, PTA   05/04/2018

## 2018-05-04 NOTE — PLAN OF CARE
OUTPATIENT NEUROLOGICAL REHABILITATION  PHYSICAL THERAPY EVALUATION    Name: Lacey Montana  Clinic Number: [id number]    Medical Diagnosis:   Z86.73 (ICD-10-CM) - History of completed stroke   G81.94 (ICD-10-CM) - Left hemiparesis   M21.372 (ICD-10-CM) - Left foot drop   M54.41,G89.29 (ICD-10-CM) - Chronic midline low back pain with right-sided sciatica   R26.9 (ICD-10-CM) - Gait disorder       Encounter Diagnosis:   1. Impaired functional mobility, balance, gait, and endurance     2. Impaired motor control     3. Decreased range of motion       Physician: Juan Pablo Anaya MD  Treatment Orders: PT Eval and Treat  Past Medical History:   Diagnosis Date    Anticoagulant long-term use     Encounter for blood transfusion     GERD (gastroesophageal reflux disease)     Seizures     Stroke        Evaluation Date: 5/1/2018  Visit #: 1  Plan of care expiration: 6/12/2018  Precautions: universal    Functional Limitations Reports - G Codes  Category: mobility   Tool: tinetti, TUG, SSWS  Score: 17/28, 19.1 sec with LBQC, 0.91 m/s with LBQC  Current: CJ at least 20% < 40% impaired, limited or restricted  Goal: CI at least 1% but less than 20% impaired, limited or restricted    History   Medical Diagnosis: h/o CVA, L hemiparesis, L foot drop, chronic midline LBP with R sided sciatica, gait disorder  PT Diagnosis: decreased ROM, impaired motor control, impaired balance/ gait  Chief complaint: decreased ambulation, need for AFO  History of Present Illness: Lacey is a 62 y.o. female that presents to [facility] secondary to CVA 2003 (crainotomy), 2005 (crainoplasty) both affecting left side. Pt has a h/o LBP with R sciatica. PMHx: Seizures ~1x/6 months. Pt reports seizure medication dose is too high, reports rash and itching since change of medication.     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  Previous functional status includes: pt reports she was able to perform all ADLs without assistance, functionally ambulating. No instances of LLE "giving out"   Current functional status:  left leg feels as though it is getting weaker, LLE is giving out while ambulating around the house. Pt also reports beginning to get light-headed and dizzy. Pt reports ambualtion around the house for fitness, performing leg lifts for exercises  DME owned:  shower chair, has orders for rolator and BSC  Work/Job description:  not working      Subjective   Pt stated goals: be able to lose some weight, improve ambulation without AFO  Family present/states: NA  Pain: pain over leasions    FOTO cerebrovascular Disorders Survey= 58%, 42% impairment  Objective   - Follows commands: yes   - Speech: no deficits     Mental status: alert, oriented to person, place, and time  Appearance: Casually dressed  Behavior:  calm and cooperative  Attention Span and Concentration:  Normal    Dominant hand:  right     Posture Alignment :forward head, rounded shoulders, L foot- full pronation, calcaneal IV, midfoot pronation, forefoot abd    Skin integrity:  Impaired: multiple leasions noted on LUE, per pt leasions are "all over" and began after medication change. Pt reports itching over leasions.     Sensation:  Light Touch: Impaired: decreased below the knee on LLE           Proprioception:   Intact    Tone: 0 - No increase in muscle tone  Limbs/muscles affected: LLE hypotonic    Visual/Auditory: denies changes , reports "feeling like glass is in eyes"    Coordination:   - fine motor: thumb to fingertip- poor and slow gross motor control of LUE with abnormal synergy noted  - UE coordination: supination/ pronation-  Unable L  - LE coordination:  unable to tap toes on L    ROM: "   UPPER EXTREMITY--AROM/PROM  (R) UE: WFLs  (L) UE: limited as follows: AROM limited due to abnormal synergistic pattern    L scapula- poor ROM all motions, worst is depression, abduction is fair         RANGE OF MOTION--LOWER EXTREMITIES  (R) LE Hip:WFLs   Knee:WFLs   Ankle:WFLs    (L) LE: Hip:WFLs   Knee:WFLs   Ankle: 0 deg DF AAROM  L pelvis- poor motions, limited ROM for lateral tilt (none noted)    Strength: manual muscle test grades below   Upper Extremity Strength  RUE grossly WFLs  LUE- able to  (poor strength), unable to open hand, able to flex and ext elbow with abnormal synergy, able to flex L shoulder to ~90 deg abnormal synergy, unable to ER, able to IR    Lower Extremity Strength   RLE LLE   Hip Flexion: 4/5 2+/5   Hip Extension:  4+/5 2/5   Hip Abduction: 3+/5 2-/5   Hip Adduction: NT nt   Knee Extension: 5/5 4/5   Knee Flexion: 5/5 2-/5   Ankle Dorsiflexion: 5/5 trace   Ankle Plantarflexion: 5/5 trace   Ankle Inversion: 5/5 trace   Ankle Eversion: 5/5 unable   * IV/DF occurs with request for DF  * abnormal synergy with LLE attempts at isolated motion    Abdominal Strength: NT     Evaluation   Single Limb Stance R LE NT  (<10 sec = HIGH FALL RISK)   Single Limb Stance L LE unable  (<10 sec = HIGH FALL RISK)   30 second Chair Rise NT   5 times sit-stand 15.5 seconds with UE support     Postural control:  Dynamic sitting balance= good  Static standing balance without AD= fair to good  Dynamic standing balance without AD= poor to fair    Gait Assessment:   - AD used: LBQC (used shopping cart to attend PT session)   - Assistance: mod I  - Distance: can ambulate 130 ft with shopping cart  - Curb: NT due to time constraints  - Ramp: NT due to time constraints  - Stairs: NT due to time constraints    Tinetti Balance Assessment  Balance:  1. Sitting Balance 1   Leans/ slides in chair= 0   Steady= 1  2. Rises from chair 1   Unable without help= 0   Able, uses arms= 1   Able without use of arms= 2  3.  Attempts to rise 2   Unable without help= 0   Able, >1 attmept required-= 1   Able, 1 attempt= 2  4. Immediate standing balance (1st 5 seconds) 1   Unsteady (swaggers, moves feet, trunk sway)= 0   Steady but uses walker or other support= 1   Narrow base of support without walker or support= 2  5. Nudged 2   Begins to fall= 0   Staggers, grabs, catches self= 1   Steady= 2  6. Standing balance 1   Unsteady= 0   Steady but wide FELICIA or uses AD=1   Narrow FELICIA without AD=2  7. Eyes closed 1   Unsteady= 0   Steady= 1  8. Turning 360 degrees 1, steady   Discontinuous steps= 0   Continuous steps= 1   Unsteady= 0   Steady= 1  9. Sitting down 1   Unsafe= 0   Uses arms or not in a smooth motion= 1   Safe, smooth motion= 2  Balance score= 11/16  Gait:  1. Initiation 1   hesitates or multiple attempts to start= 0   No hesitancy= 1  2. Step length 2   Step to= 0   One foot passes= 1   reciprocal pattern= 2  3. Step height 2    Neither foot clears floor= 0   One foot clears floor= 1   Both feet clear floor= 2  4. Step symmetry 0   Not symmetrical= 0   Appears symmetrical= 1  5. Step continuity 1   Not continuous= 0   Appears continuous= 1  6. Path 0   Marked deviation= 0   Mild/moderate deviation or uses A.D.= 1   Straight without A.D.= 2  7. Trunk 0   Marked sway or uses A.D.= 0   No sway, but flexes knees or back, spread arms out while walking= 1   No sway, no flexion, no use of UE, no use of A.D.= 2  8. Walking stance 0   Heels apart= 0   Heels almost touching while walking= 1  Gait score= 6/12  Total score= 17/28 , high fall risk         GAIT DEVIATIONS:  Lacey displays the following deviations with ambulation: LLE ER throughout, poor left hip ext in terminal stance, L knee hyperext in  mid stance, poor weight shift onto LLE, decreased L scap and pelvic mobility.     Impairments contributing to deviations: impaired motor control, decreased strength, decreased ROM    Endurance Deficit: fair to good for eval (30 min)       Evaluation   Timed Up and Go 19.1 sec with LBQC   Self Selected Walking Speed 0.91 m/sec (6m/6.6s) with LBQC   Fast Walking Speed 1 m/sec (6m/6s) with LBQC     Functional Mobility (Bed mobility, transfers)  Bed mobility: Mod I  Supine to sit: Mod I  Sit to supine: Mod I  Rolling: Mod I  Transfers to bed: Mod I  Transfers to toilet: Mod I  Sit to stand:  Mod I  Stand pivot:  Mod I  Car transfers: Mod I  Wheelchair mobility: NA  Floor transfers: NT    Written Home Exercises Provided: to be provided at follow up      Education provided re:role of PT, goals for PT, scheduling - pt verbalized understanding.     Lacey verbalized good understanding of education provided.   Pt has no cultural, educational or language barriers to learning provided.      Assessment   This is a 62 y.o. female referred to outpatient physical therapy and presents with a medical diagnosis of h/o CVA, L hemiparesis, L foot drop, chronic midline LBP with R sided sciatica, gait disorder and demonstrates limitations as described in the problem list. Pt demonstrates significant motor control issues on L side of body with limited ROM for foot/ ankle, pelvic, and scapula. Due to pt's deficits, pt is unable to isolate motion, presents with significant gait impairments, and is at risk for falls per formal assessments. 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 Pt reports impairments have been gradually progressing over time.  Pt demonstrates a 38% average level of impairment for mobility, pt is anticipated to improve impairment level to 15% by d/c.      History  Co-morbidities and personal factors that may impact the plan of care Examination  Body Structures and Functions, activity limitations and participation restrictions that may impact the plan of care    Clinical Presentation    Co-morbidities:   length of time since onset of impairments, seizures, recent medication changes, recent onset of dizziness        Personal Factors:   no deficits Body Regions:   lower extremities  upper extremities  trunk    Body Systems:    gross symmetry  ROM  strength  gross coordinated movement  balance  gait  transfers  transitions  motor control  skin integrity    Participation Restrictions:   1. Fall Risk - impaired balance   2. Weakness   3. Impaired motor control  4. Decreased ROM  5. Gait deviations   6. Decreased ambulation   7. Decreased activity tolerance   8. Difficulty participating in daily activities   9. Difficulty in participating in role as caregiver    10. Requires skilled supervision to complete and progress HEP   11. Requires skilled intervention and recommendations for AD/ orthotics     Activity limitations:   Learning and applying knowledge  no deficits    General Tasks and Commands  undertaking a single task    Communication  no deficits    Mobility  lifting and carrying objects  fine hand use (grasping/picking up)  walking  driving (bike, car, motorcycle)    Self care  no deficits    Domestic Life  doing house work (cleaning house, washing dishes, laundry)  assisting others    Interactions/Relationships  no deficits    Life Areas  no deficits    Community and Social Life  recreation and leisure         evolving clinical presentation with changing clinical characteristics                      moderate     Pt rehab potential is Good. Pt will benefit from continuing skilled outpatient physical therapy to address the deficits listed below in the problem list, provide pt/family education and to maximize pt's level of independence in the home and community environment.       Pt's spiritual, cultural and educational needs considered and pt agreeable to plan of care and goals as stated below:     GOALS:   Short term goals: 3-4 weeks, pt agrees to goals set.  1. Pt will perform HEP for basic  strengthening and ROM with supervision to improve carryover of progress.   2. Pt will demonstrate improved L DF to 4 degrees to improve foot clearance to decreased LLE ER during gait.   3. Pt will demonstrate improved gait and mobility by demonstrating TUG with SBQC in 15 sec.   4. Pt will demonstrate improved motor control and use of LLE for mobility by performing 5 times sit<>stand test in 10 sec with UE use as needed.   5. Pt will demonstrate improved mobility/ gait and decreased fall risk from high to moderate by scoring 19/28 on Tinetti Assessment.   6. Make appropriate recommendations for DME as needed.     Long term goals: 6 weeks, pt agrees to goals set  7. Pt will demonstrate improved mobility and decreased fall risk by performing TUG with appropriate AD in 12 sec.   8. Pt will demonstrate improved L DF to 10 degrees to demonstrate improved resting position/ mobility of foot/ ankle and improve foot clearance during gait.   9. Pt will demonstrate improved mobility/ gait by scoring 22/28 on Tinetti Assessment.   10. Pt will ambulate with appropriate AD for 150 ft without significant ER of LLE to demonstrate improved gait quality and reduce abnormal motions which may cause a secondary impairment of LBP.   11. Pt will demonstrate improved gait velocity to 1.14 m/s with appropraite AD to demonstrate a 25% improvement in gait.       Plan   Outpatient physical therapy 2 times weekly for 6 weeks to include: Pt Education, HEP, therapeutic exercises, gait training, neuromuscular re-education, therapeutic activities, manual therapy, joint mobilizations, and modalities PRN to achieve established goals. Pt may be seen by PTA as part of the rehabilitation team.       Monica Mosley, PT  05/01/2018      I certify the need for these services furnished under this plan of treatment and while under my care.    Juan Pablo Anaya MD    Physician/Referring Practitioner     05/04/2018  Date of Signature

## 2018-05-11 ENCOUNTER — CLINICAL SUPPORT (OUTPATIENT)
Dept: REHABILITATION | Facility: HOSPITAL | Age: 63
End: 2018-05-11
Attending: PHYSICAL MEDICINE & REHABILITATION
Payer: MEDICARE

## 2018-05-11 DIAGNOSIS — Z78.9 IMPAIRED MOTOR CONTROL: ICD-10-CM

## 2018-05-11 DIAGNOSIS — Z74.09 IMPAIRED FUNCTIONAL MOBILITY, BALANCE, GAIT, AND ENDURANCE: ICD-10-CM

## 2018-05-11 PROCEDURE — 97110 THERAPEUTIC EXERCISES: CPT | Mod: PO

## 2018-05-11 PROCEDURE — 97140 MANUAL THERAPY 1/> REGIONS: CPT | Mod: PO

## 2018-05-11 NOTE — PROGRESS NOTES
Physical Therapy Progress Note     Name: Lacey Montana  Clinic Number: [id number]  Diagnosis:   Encounter Diagnoses   Name Primary?    Impaired functional mobility, balance, gait, and endurance     Impaired motor control      Physician: Juan Pablo Anaya MD  Treatment Orders: PT Evaluation and Treatment  Past Medical History:   Diagnosis Date    Anticoagulant long-term use     Encounter for blood transfusion     GERD (gastroesophageal reflux disease)     Seizures     Stroke        Precautions: universal  Visit #: 3  Date of Eval: 5/01/18  Plan of Care Expiration: 6/12/18    G codes 3/10     On ST caseload?No         Subjective   Pt reports: can only sweep one room at home and then have to sit down because of R foot pain.   Pain Scale:  denies    Objective     Patient received individual therapy with activities as follows for 35 min:     EOM:  Lateral hip rotation in both directions with wedge under L glut   Scapular retractions 2 x 15 with 5 sec holds np  Baps to elicit DF next visit    Supine:  PPT with TA bracing 2 x 15 with 5 sec holds  Bridges with ball squeeze next visit  Piriformis stretch 2 x 1 min    Side lying:  Reverse clams next visit     Standing:  Gastroc stretch on slant board 2 x 1 min  Internal rotation hip stretch next visit  Fwd/bck and lateral weight shifting in // bars with verbal/tactile cues for proper L foot placement np  Ambulation with SBQC 1 trial of ~150 ft     Manual tx: manual realignment of the forefoot, midfoot, and hindfoot for 10 min.      Written Home Exercises:   Pt demo good understanding of the education provided including: bridges with ball squeeze, internal rotation hip stretch and PPT(3837ZVG).  Lacey demonstrated good return demonstration of activities.     Education provided re: POC, HEP  No spiritual or educational barriers to learning provided    Pt has no cultural, educational or language barriers to  learning provided.    Assessment   Pt demonstrated general hip stiffness with significant tight L hip external rotators. HEP was expanded in order to promote hip mobility/flexibility and hip strengthening. Pt could benefit from internal rotator strengthening during subsequent tx visits. Tx focused on foot mobility, hip mobility/flexibilit and LLE flexibility. Will progress as tolerated.      GOALS:   Short term goals: 3-4 weeks, pt agrees to goals set.  1. Pt will perform HEP for basic strengthening and ROM with supervision to improve carryover of progress.   2. Pt will demonstrate improved L DF to 4 degrees to improve foot clearance to decreased LLE ER during gait.   3. Pt will demonstrate improved gait and mobility by demonstrating TUG with SBQC in 15 sec.   4. Pt will demonstrate improved motor control and use of LLE for mobility by performing 5 times sit<>stand test in 10 sec with UE use as needed.   5. Pt will demonstrate improved mobility/ gait and decreased fall risk from high to moderate by scoring 19/28 on Tinetti Assessment.   6. Make appropriate recommendations for DME as needed.      Long term goals: 6 weeks, pt agrees to goals set  7. Pt will demonstrate improved mobility and decreased fall risk by performing TUG with appropriate AD in 12 sec.   8. Pt will demonstrate improved L DF to 10 degrees to demonstrate improved resting position/ mobility of foot/ ankle and improve foot clearance during gait.   9. Pt will demonstrate improved mobility/ gait by scoring 22/28 on Tinetti Assessment.   10. Pt will ambulate with appropriate AD for 150 ft without significant ER of LLE to demonstrate improved gait quality and reduce abnormal motions which may cause a secondary impairment of LBP.   11. Pt will demonstrate improved gait velocity to 1.14 m/s with appropraite AD to demonstrate a 25% improvement in gait.      Plan   Continue PT 2x weekly under established Plan of Care, with treatment to include: pt  education, HEP, therapeutic exercises, neuromuscular re-education/balance exercises, therapeutic activities, joint mobilizations, and modalities PRN, to work towards established goals. Pt may be seen by PTA to carry out plan of care.     Nic Weiss, PTA   05/11/2018

## 2018-05-15 ENCOUNTER — CLINICAL SUPPORT (OUTPATIENT)
Dept: REHABILITATION | Facility: HOSPITAL | Age: 63
End: 2018-05-15
Attending: PHYSICAL MEDICINE & REHABILITATION
Payer: MEDICARE

## 2018-05-15 DIAGNOSIS — Z78.9 IMPAIRED MOTOR CONTROL: ICD-10-CM

## 2018-05-15 DIAGNOSIS — Z74.09 IMPAIRED FUNCTIONAL MOBILITY, BALANCE, GAIT, AND ENDURANCE: ICD-10-CM

## 2018-05-15 PROCEDURE — 97140 MANUAL THERAPY 1/> REGIONS: CPT | Mod: PO

## 2018-05-15 PROCEDURE — 97110 THERAPEUTIC EXERCISES: CPT | Mod: PO

## 2018-05-15 NOTE — PROGRESS NOTES
Physical Therapy Progress Note     Name: Lacey Montana  Clinic Number: [id number]  Diagnosis:   No diagnosis found.  Physician: Juan Pablo Anaya MD  Treatment Orders: PT Evaluation and Treatment  Past Medical History:   Diagnosis Date    Anticoagulant long-term use     Encounter for blood transfusion     GERD (gastroesophageal reflux disease)     Seizures     Stroke        Precautions: universal  Visit #: 5  Date of Eval: 5/01/18  Plan of Care Expiration: 6/12/18    G codes 5/10     On ST caseload?No         Subjective   Pt reports: tried all of the HEP exercises that she received during the last tx visit.   Pain Scale:  denies    Objective     Patient received individual therapy with activities as follows for 35 min:     EOM:  Lateral hip rotation in both directions with wedge under L glut   Scapular retractions 2 x 15 with 5 sec holds np  Baps to elicit DF next visit    Supine:  PPT with TA bracing 2 x 15 with 5 sec holds  Bridges with ball squeeze 2 x 10 with 3 sec hold  Piriformis stretch 2 x 1 min    Side lying:  AAROM Reverse clams 2 x 10 with 3 sec hold    Standing:  Gastroc stretch on slant board 2 x 1 min  Internal rotation hip stretch 2 x 1 min  Fwd/bck and lateral weight shifting in // bars with verbal/tactile cues for proper L foot placement   Ambulation with SBQC 1 trial of ~150 ft     Manual tx: manual realignment of the forefoot, midfoot, and hindfoot for 10 min.      Written Home Exercises:   Pt demo good understanding of the education provided including: bridges with ball squeeze, internal rotation hip stretch and PPT(3837ZVG).  Lacey demonstrated good return demonstration of activities.     Education provided re: POC, HEP  No spiritual or educational barriers to learning provided    Pt has no cultural, educational or language barriers to learning provided.    Assessment   Pt has improved hip mobility and PPT are performed with less  effort and fluidity. Pt does still require verbal/tactile cues to obtain L sided lateral hip rotation. Pt could benefit from expanding HEP during the next tx visit in order to promote L hip internal rotation. Tx focused on foot mobility, hip mobility/flexibilit and LLE flexibility. Will progress as tolerated.      GOALS:   Short term goals: 3-4 weeks, pt agrees to goals set.  1. Pt will perform HEP for basic strengthening and ROM with supervision to improve carryover of progress.   2. Pt will demonstrate improved L DF to 4 degrees to improve foot clearance to decreased LLE ER during gait.   3. Pt will demonstrate improved gait and mobility by demonstrating TUG with SBQC in 15 sec.   4. Pt will demonstrate improved motor control and use of LLE for mobility by performing 5 times sit<>stand test in 10 sec with UE use as needed.   5. Pt will demonstrate improved mobility/ gait and decreased fall risk from high to moderate by scoring 19/28 on Tinetti Assessment.   6. Make appropriate recommendations for DME as needed.      Long term goals: 6 weeks, pt agrees to goals set  7. Pt will demonstrate improved mobility and decreased fall risk by performing TUG with appropriate AD in 12 sec.   8. Pt will demonstrate improved L DF to 10 degrees to demonstrate improved resting position/ mobility of foot/ ankle and improve foot clearance during gait.   9. Pt will demonstrate improved mobility/ gait by scoring 22/28 on Tinetti Assessment.   10. Pt will ambulate with appropriate AD for 150 ft without significant ER of LLE to demonstrate improved gait quality and reduce abnormal motions which may cause a secondary impairment of LBP.   11. Pt will demonstrate improved gait velocity to 1.14 m/s with appropraite AD to demonstrate a 25% improvement in gait.      Plan   Continue PT 2x weekly under established Plan of Care, with treatment to include: pt education, HEP, therapeutic exercises, neuromuscular re-education/balance exercises,  therapeutic activities, joint mobilizations, and modalities PRN, to work towards established goals. Pt may be seen by PTA to carry out plan of care.     Nic Weiss, PTA   05/15/2018

## 2018-05-18 ENCOUNTER — CLINICAL SUPPORT (OUTPATIENT)
Dept: REHABILITATION | Facility: HOSPITAL | Age: 63
End: 2018-05-18
Attending: PHYSICAL MEDICINE & REHABILITATION
Payer: MEDICARE

## 2018-05-18 DIAGNOSIS — Z74.09 IMPAIRED FUNCTIONAL MOBILITY, BALANCE, GAIT, AND ENDURANCE: ICD-10-CM

## 2018-05-18 DIAGNOSIS — Z78.9 IMPAIRED MOTOR CONTROL: ICD-10-CM

## 2018-05-18 PROCEDURE — 97116 GAIT TRAINING THERAPY: CPT | Mod: PO

## 2018-05-18 PROCEDURE — 97110 THERAPEUTIC EXERCISES: CPT | Mod: PO

## 2018-05-18 NOTE — PROGRESS NOTES
Physical Therapy Progress Note     Name: Lacey Montana  Clinic Number: [id number]  Diagnosis:   Encounter Diagnoses   Name Primary?    Impaired functional mobility, balance, gait, and endurance     Impaired motor control      Physician: Juan Pablo Anaya MD  Treatment Orders: PT Evaluation and Treatment  Past Medical History:   Diagnosis Date    Anticoagulant long-term use     Encounter for blood transfusion     GERD (gastroesophageal reflux disease)     Seizures     Stroke        Precautions: universal  Visit #: 6  Date of Eval: 5/01/18  Plan of Care Expiration: 6/12/18    G codes 6/10     On ST caseload?No         Subjective   Pt reports: has been doing the pelvic tilts in all directions on a consistent basis.    Pain Scale:  denies    Objective     Patient received individual therapy with activities as follows for 35 min:     EOM:  Lateral hip rotation in both directions with wedge under L glut   Scapular retractions 2 x 15 with 5 sec holds np  Baps to elicit DF next visit    Supine:  PPT with TA bracing 2 x 15 with 5 sec holds np  Bridges with ball squeeze 2 x 10 with 3 sec hold np  Piriformis stretch 2 x 1 min  L hip stretches into internal rotation    Side lying:  AAROM Reverse clams 2 x 10 with 3 sec hold np    Standing:  Gastroc stretch on slant board 2 x 1 min  Internal rotation hip stretch 2 x 1 min  Fwd/bck and lateral weight shifting in // bars with verbal/tactile cues for proper L foot placement np  Ambulation with SBQC 1 trial of ~150 ft np    Gait tx: Ambulation in // bars with RUE support and verbal cues for LLE internal rotation and heel strike for 10 min.        Manual tx: manual realignment of the forefoot, midfoot, and hindfoot for 5 min.      Written Home Exercises:   Pt demo good understanding of the education provided including: bridges with ball squeeze, internal rotation hip stretch and PPT(3837ZVG & [id number] ). Lacey  demonstrated good return demonstration of activities.     Education provided re: POC, HEP  No spiritual or educational barriers to learning provided    Pt has no cultural, educational or language barriers to learning provided.    Assessment   Pt has improved hip mobility in all directions as hips are moving with less effort and fluidity. Tx was expanded in order to include a variety of hip stretches into IR in order to place pt in a better position to normalize gait. Pt still requires verbal/tactile cues to obtain L sided lateral hip rotation. HEP was also expanded in order to promote L hip internal rotation. Tx focused on foot mobility, hip mobility/flexibilit and LLE flexibility. Will progress as tolerated.      GOALS:   Short term goals: 3-4 weeks, pt agrees to goals set.  1. Pt will perform HEP for basic strengthening and ROM with supervision to improve carryover of progress.   2. Pt will demonstrate improved L DF to 4 degrees to improve foot clearance to decreased LLE ER during gait.   3. Pt will demonstrate improved gait and mobility by demonstrating TUG with SBQC in 15 sec.   4. Pt will demonstrate improved motor control and use of LLE for mobility by performing 5 times sit<>stand test in 10 sec with UE use as needed.   5. Pt will demonstrate improved mobility/ gait and decreased fall risk from high to moderate by scoring 19/28 on Tinetti Assessment.   6. Make appropriate recommendations for DME as needed.      Long term goals: 6 weeks, pt agrees to goals set  7. Pt will demonstrate improved mobility and decreased fall risk by performing TUG with appropriate AD in 12 sec.   8. Pt will demonstrate improved L DF to 10 degrees to demonstrate improved resting position/ mobility of foot/ ankle and improve foot clearance during gait.   9. Pt will demonstrate improved mobility/ gait by scoring 22/28 on Tinetti Assessment.   10. Pt will ambulate with appropriate AD for 150 ft without significant ER of LLE to  demonstrate improved gait quality and reduce abnormal motions which may cause a secondary impairment of LBP.   11. Pt will demonstrate improved gait velocity to 1.14 m/s with appropraite AD to demonstrate a 25% improvement in gait.      Plan   Continue PT 2x weekly under established Plan of Care, with treatment to include: pt education, HEP, therapeutic exercises, neuromuscular re-education/balance exercises, therapeutic activities, joint mobilizations, and modalities PRN, to work towards established goals. Pt may be seen by PTA to carry out plan of care.     Nic Weiss, PTA   05/18/2018

## 2018-05-22 ENCOUNTER — CLINICAL SUPPORT (OUTPATIENT)
Dept: REHABILITATION | Facility: HOSPITAL | Age: 63
End: 2018-05-22
Attending: PHYSICAL MEDICINE & REHABILITATION
Payer: MEDICARE

## 2018-05-22 DIAGNOSIS — Z74.09 IMPAIRED FUNCTIONAL MOBILITY, BALANCE, GAIT, AND ENDURANCE: ICD-10-CM

## 2018-05-22 DIAGNOSIS — Z78.9 IMPAIRED MOTOR CONTROL: ICD-10-CM

## 2018-05-22 PROCEDURE — 97116 GAIT TRAINING THERAPY: CPT | Mod: PO

## 2018-05-22 PROCEDURE — 97110 THERAPEUTIC EXERCISES: CPT | Mod: PO

## 2018-05-22 NOTE — PROGRESS NOTES
Physical Therapy Progress Note     Name: Lacey Montana  Clinic Number: [id number]  Diagnosis:   Encounter Diagnoses   Name Primary?    Impaired functional mobility, balance, gait, and endurance     Impaired motor control      Physician: Juan Pablo Anaya MD  Treatment Orders: PT Evaluation and Treatment  Past Medical History:   Diagnosis Date    Anticoagulant long-term use     Encounter for blood transfusion     GERD (gastroesophageal reflux disease)     Seizures     Stroke        Precautions: universal  Visit #: 7  Date of Eval: 5/01/18  Plan of Care Expiration: 6/12/18    G codes 7/10     On ST caseload?No         Subjective   Pt reports: that she has been doing all her stretches and she finds herself walking better but does realize that she has to slow down.      Pain Scale:  denies    Objective     Patient received individual therapy with activities as follows for 35 min:     EOM:  Lateral hip rotation in both directions with wedge under L glut   Scapular retractions 2 x 15 with 5 sec holds np  Baps to elicit DF next visit    Supine:  PPT with TA bracing 2 x 15 with 5 sec holds np  Bridges with ball squeeze 2 x 10 with 3 sec hold   Piriformis stretch   L hip stretches into internal rotation    Side lying:  AAROM Reverse clams 2 x 10 with 3 sec hold np    Standing:  Gastroc stretch on slant board 2 x 1 min  Internal rotation hip stretch 2 x 1 min np  Fwd/bck and lateral weight shifting in // bars with verbal/tactile cues for proper L foot placement        Gait tx: Ambulation with SBQC 1 trial of ~150 ft. Ambulation in // bars with RUE support and verbal cues for LLE internal rotation and heel strike for 10 min.        Manual tx: manual realignment of the forefoot, midfoot, and hindfoot for 5 min. np      Written Home Exercises:   Pt demo good understanding of the education provided including: bridges with ball squeeze, internal rotation hip stretch  and PPT(3837ZVG & [id number] ). Lacey demonstrated good return demonstration of activities.     Education provided re: POC, HEP  No spiritual or educational barriers to learning provided    Pt has no cultural, educational or language barriers to learning provided.    Assessment   Pt continues to demonstrate improved hip mobility in all directions as hips are moving with less effort and increased fluidity. Pt's gait is also slightly improved as L foot is in better alignment. Pt still requires verbal/tactile cues to obtain L sided lateral hip rotation and could benefit from continued L hip stretching into IR in order to place pt in a better position to normalize gait. It was noted pt continues to perform HEP on a regular basis. Tx focused on foot mobility, hip mobility/flexibilit and LLE flexibility. Will progress as tolerated.      GOALS:   Short term goals: 3-4 weeks, pt agrees to goals set.  1. Pt will perform HEP for basic strengthening and ROM with supervision to improve carryover of progress.   2. Pt will demonstrate improved L DF to 4 degrees to improve foot clearance to decreased LLE ER during gait.   3. Pt will demonstrate improved gait and mobility by demonstrating TUG with SBQC in 15 sec.   4. Pt will demonstrate improved motor control and use of LLE for mobility by performing 5 times sit<>stand test in 10 sec with UE use as needed.   5. Pt will demonstrate improved mobility/ gait and decreased fall risk from high to moderate by scoring 19/28 on Tinetti Assessment.   6. Make appropriate recommendations for DME as needed.      Long term goals: 6 weeks, pt agrees to goals set  7. Pt will demonstrate improved mobility and decreased fall risk by performing TUG with appropriate AD in 12 sec.   8. Pt will demonstrate improved L DF to 10 degrees to demonstrate improved resting position/ mobility of foot/ ankle and improve foot clearance during gait.   9. Pt will demonstrate improved mobility/ gait by scoring  22/28 on Tinetti Assessment.   10. Pt will ambulate with appropriate AD for 150 ft without significant ER of LLE to demonstrate improved gait quality and reduce abnormal motions which may cause a secondary impairment of LBP.   11. Pt will demonstrate improved gait velocity to 1.14 m/s with appropraite AD to demonstrate a 25% improvement in gait.      Plan   Continue PT 2x weekly under established Plan of Care, with treatment to include: pt education, HEP, therapeutic exercises, neuromuscular re-education/balance exercises, therapeutic activities, joint mobilizations, and modalities PRN, to work towards established goals. Pt may be seen by PTA to carry out plan of care.     Nic Weiss, PTA   05/22/2018

## 2018-05-25 ENCOUNTER — CLINICAL SUPPORT (OUTPATIENT)
Dept: REHABILITATION | Facility: HOSPITAL | Age: 63
End: 2018-05-25
Attending: PHYSICAL MEDICINE & REHABILITATION
Payer: MEDICARE

## 2018-05-25 DIAGNOSIS — Z74.09 IMPAIRED FUNCTIONAL MOBILITY, BALANCE, GAIT, AND ENDURANCE: ICD-10-CM

## 2018-05-25 DIAGNOSIS — Z78.9 IMPAIRED MOTOR CONTROL: ICD-10-CM

## 2018-05-25 DIAGNOSIS — M25.60 DECREASED RANGE OF MOTION: Primary | ICD-10-CM

## 2018-05-25 PROCEDURE — 97112 NEUROMUSCULAR REEDUCATION: CPT | Mod: PO | Performed by: PHYSICAL THERAPIST

## 2018-05-25 NOTE — PROGRESS NOTES
"                                                    Physical Therapy Progress Note     Name: Lacey Montana  Clinic Number: [id number]  Diagnosis:   Encounter Diagnoses   Name Primary?    Impaired functional mobility, balance, gait, and endurance     Impaired motor control      Physician: Juan Pablo Anaya MD  Treatment Orders: PT Evaluation and Treatment  Past Medical History:   Diagnosis Date    Anticoagulant long-term use     Encounter for blood transfusion     GERD (gastroesophageal reflux disease)     Seizures     Stroke        Precautions: universal  Visit #: 8  Date of Eval: 5/01/18  Plan of Care Expiration: 6/12/18    Functional Limitations Reports - G Codes  Category: mobility   Tool: tinetti, TUG, SSWS  Score: 17/28, 19.1 sec with LBQC, 0.91 m/s with LBQC    G codes 8/10    eval CJ-CI         Subjective   Pt reports: no new complaints. Pt reports she believes the stretching is helping her walk better.   Pain Scale:  denies    Objective     Patient received individual therapy with activities as follows:     pt participated in neuromuscular re education x 54 minutes to address motor control and coordination to improve all mobility and balance:     EOM:  Restoration of arches of L hand  6 basic wrist stretches: 1. P-A glide of scaphoid on radius, 2. Radial deviation,    3. Increasing mobility of metacarpals, 4. Carpal roll, 5. Movement of forearm on  hand towards wrist extension, 6. Movement of forearm on the hand toward  supination/ pronation  Paddle splint applied to L hand  45 degree wrist extension brace applied to L    DF stretch with wedge with restoration of arches + toe extension    Practice with support of body with RUE- facilitation of RUE for support    Single hip scooting with L hand on 2" box, mod TC  CHUNG hip scooting with min facilitation    1/2 squat transfer with RUE on chair on 2- 4" blocks, mod A    Gait training with RW (docking station used) x 189 ft with mod A to maintain proper " LLE neutral rotation alignment     facilitated L wrist extension after removal of paddle. Pt able to demonstrate < 1/4 range gravity eliminated.    NP today:   Scapular retractions 2 x 15 with 5 sec holds   Baps to elicit DF next visit  Supine:  PPT with TA bracing 2 x 15 with 5 sec holds   Bridges with ball squeeze 2 x 10 with 3 sec hold   Piriformis stretch   L hip stretches into internal rotation  Side lying:  AAROM Reverse clams 2 x 10 with 3 sec hold   Standing:  Gastroc stretch on slant board 2 x 1 min  Internal rotation hip stretch 2 x 1 min  Fwd/bck and lateral weight shifting in // bars with verbal/tactile cues for proper L foot placement       Written Home Exercises:   Pt demo good understanding of the education provided including: bridges with ball squeeze, internal rotation hip stretch and PPT([id number] & [id number] ). Lacey demonstrated good return demonstration of activities.     Education provided re: POC, HEP  No spiritual or educational barriers to learning provided    Pt has no cultural, educational or language barriers to learning provided.    Assessment   Lacey tolerated treatment well. PT addressed pt's motor control of pelvis and LUE. Pt demonstrated poor weight bearing through LUE/ LLE during single hip scooting. Pt leans entire body and lifts feet off of the floor when attempting to scoot hips forward. Pt demonstrated improved use of LUE for scooting after facilitating support. PT addressed ROM of pelvis and L ankle to improve all mobility. PT also address gait with RW to improve fluidity and decreased L trunk rotation during L terminal stance. Pt reports she feels as though all of the stretching is helping with gait. Pt can benefit from continued skilled PT to address impairments to improve quality of mobility to improve balance, decrease fall risk, and decrease possibility of secondary impairments (i.e. OA).     Anticipated barriers to progress:length of time since onset of impairments,  seizures, recent medication changes, recent onset of dizziness    Impairment List:  Fall Risk - impaired balance   Weakness   Impaired motor control  Decreased ROM  Gait deviations   Decreased ambulation   Decreased activity tolerance   Difficulty participating in daily activities   Difficulty in participating in role as caregiver    Requires skilled supervision to complete and progress HEP   Requires skilled intervention and recommendations for AD/ orthotics    GOALS:   Short term goals: 3-4 weeks, pt agrees to goals set.  1. Pt will perform HEP for basic strengthening and ROM with supervision to improve carryover of progress.   2. Pt will demonstrate improved L DF to 4 degrees to improve foot clearance to decreased LLE ER during gait.   3. Pt will demonstrate improved gait and mobility by demonstrating TUG with SBQC in 15 sec.   4. Pt will demonstrate improved motor control and use of LLE for mobility by performing 5 times sit<>stand test in 10 sec with UE use as needed.   5. Pt will demonstrate improved mobility/ gait and decreased fall risk from high to moderate by scoring 19/28 on Tinetti Assessment.   6. Make appropriate recommendations for DME as needed.      Long term goals: 6 weeks, pt agrees to goals set  7. Pt will demonstrate improved mobility and decreased fall risk by performing TUG with appropriate AD in 12 sec.   8. Pt will demonstrate improved L DF to 10 degrees to demonstrate improved resting position/ mobility of foot/ ankle and improve foot clearance during gait.   9. Pt will demonstrate improved mobility/ gait by scoring 22/28 on Tinetti Assessment.   10. Pt will ambulate with appropriate AD for 150 ft without significant ER of LLE to demonstrate improved gait quality and reduce abnormal motions which may cause a secondary impairment of LBP.   11. Pt will demonstrate improved gait velocity to 1.14 m/s with appropraite AD to demonstrate a 25% improvement in gait.      Plan   Continue PT 2x weekly  under established Plan of Care, with treatment to include: pt education, HEP, therapeutic exercises, neuromuscular re-education/balance exercises, therapeutic activities, joint mobilizations, and modalities PRN, to work towards established goals. Pt may be seen by PTA to carry out plan of care.     Monica Mosley, PT   05/25/2018           Slurred speech

## 2018-05-28 ENCOUNTER — DOCUMENTATION ONLY (OUTPATIENT)
Dept: REHABILITATION | Facility: HOSPITAL | Age: 63
End: 2018-05-28

## 2018-05-28 NOTE — PROGRESS NOTES
I, RAFI BeanT, met with Isela Frederick PTA to discuss this pt's POC. Perform stretching and paddle application to L hand. Perform L DF stretching with wedge + restoration of arches of foot. Pt demonstrates poor use of LUE and motor control for scooting hips. Perform pre gait activities to improve pt's ability to support body with RLE.     Monica Mosley DPT  5/28/2018    Face to face consultation with supervision PT held on 05/28/2018    Isela Frederick PTA

## 2018-05-29 ENCOUNTER — CLINICAL SUPPORT (OUTPATIENT)
Dept: REHABILITATION | Facility: HOSPITAL | Age: 63
End: 2018-05-29
Attending: PHYSICAL MEDICINE & REHABILITATION
Payer: MEDICARE

## 2018-05-29 DIAGNOSIS — Z74.09 IMPAIRED FUNCTIONAL MOBILITY, BALANCE, GAIT, AND ENDURANCE: ICD-10-CM

## 2018-05-29 DIAGNOSIS — Z78.9 IMPAIRED MOTOR CONTROL: ICD-10-CM

## 2018-05-29 PROCEDURE — 97110 THERAPEUTIC EXERCISES: CPT | Mod: PO

## 2018-05-29 PROCEDURE — 97112 NEUROMUSCULAR REEDUCATION: CPT | Mod: PO

## 2018-05-29 NOTE — PROGRESS NOTES
"                                                    Physical Therapy Progress Note     Name: Lacey Montana  Clinic Number: [id number]  Diagnosis:   Encounter Diagnoses   Name Primary?    Impaired functional mobility, balance, gait, and endurance     Impaired motor control      Physician: Juan Pablo Anaya MD  Treatment Orders: PT Evaluation and Treatment  Past Medical History:   Diagnosis Date    Anticoagulant long-term use     Encounter for blood transfusion     GERD (gastroesophageal reflux disease)     Seizures     Stroke        Precautions: universal  Visit #: 9  Date of Eval: 5/01/18  Plan of Care Expiration: 6/12/18    Functional Limitations Reports - G Codes  Category: mobility   Tool: tinetti, TUG, SSWS  Score: 17/28, 19.1 sec with LBQC, 0.91 m/s with LBQC    G codes 9/10    eval CJ-CI         Subjective   Pt reports: " I'm doing pretty good."   Pain Scale:  Slight headache    Objective     Patient received individual therapy with activities as follows:   Therapeutic exercises to increase strength and endurance x 10 min including:    X 8 min on Sci Fit recumbent stepper.  Level 2.0    pt participated in neuromuscular re education x 35 minutes to address motor control and coordination to improve all mobility and balance:     EOM:  Restoration of arches of L hand  6 basic wrist stretches: 1. P-A glide of scaphoid on radius, 2. Radial deviation,    3. Increasing mobility of metacarpals, 4. Carpal roll, 5. Movement of forearm on  hand towards wrist extension, 6. Movement of forearm on the hand toward  supination/ pronation  Paddle splint applied to L hand  45 degree wrist extension brace applied to L    DF stretch with wedge with restoration of arches + toe extension    X 30 reps of HS curls with creeper.  Pt required anti slip pad on top of creeper and Min A for control of LLE.          Written Home Exercises:   Pt demo good understanding of the education provided including: bridges with ball " squeeze, internal rotation hip stretch and PPT. Lacey demonstrated good return demonstration of activities.     Education provided re: POC, HEP  No spiritual or educational barriers to learning provided    Pt has no cultural, educational or language barriers to learning provided.    Assessment   Lacey tolerated treatment well and did not have any complaints.  Pt began endurance training on the stepper with B UE and B LE's and began HS curls with the creeper.  Pt required Min A to control her LLE on the creeper.    Pt can benefit from continued skilled PT to address impairments to improve quality of mobility to improve balance, decrease fall risk, and decrease possibility of secondary impairments (i.e. OA).     Anticipated barriers to progress:length of time since onset of impairments, seizures, recent medication changes, recent onset of dizziness    Impairment List:  Fall Risk - impaired balance   Weakness   Impaired motor control  Decreased ROM  Gait deviations   Decreased ambulation   Decreased activity tolerance   Difficulty participating in daily activities   Difficulty in participating in role as caregiver    Requires skilled supervision to complete and progress HEP   Requires skilled intervention and recommendations for AD/ orthotics    GOALS:   Short term goals: 3-4 weeks, pt agrees to goals set.  1. Pt will perform HEP for basic strengthening and ROM with supervision to improve carryover of progress.   2. Pt will demonstrate improved L DF to 4 degrees to improve foot clearance to decreased LLE ER during gait.   3. Pt will demonstrate improved gait and mobility by demonstrating TUG with SBQC in 15 sec.   4. Pt will demonstrate improved motor control and use of LLE for mobility by performing 5 times sit<>stand test in 10 sec with UE use as needed.   5. Pt will demonstrate improved mobility/ gait and decreased fall risk from high to moderate by scoring 19/28 on Tinetti Assessment.   6. Make appropriate  recommendations for DME as needed.      Long term goals: 6 weeks, pt agrees to goals set  7. Pt will demonstrate improved mobility and decreased fall risk by performing TUG with appropriate AD in 12 sec.   8. Pt will demonstrate improved L DF to 10 degrees to demonstrate improved resting position/ mobility of foot/ ankle and improve foot clearance during gait.   9. Pt will demonstrate improved mobility/ gait by scoring 22/28 on Tinetti Assessment.   10. Pt will ambulate with appropriate AD for 150 ft without significant ER of LLE to demonstrate improved gait quality and reduce abnormal motions which may cause a secondary impairment of LBP.   11. Pt will demonstrate improved gait velocity to 1.14 m/s with appropraite AD to demonstrate a 25% improvement in gait.      Plan   Continue PT 2x weekly under established Plan of Care, with treatment to include: pt education, HEP, therapeutic exercises, neuromuscular re-education/balance exercises, therapeutic activities, joint mobilizations, and modalities PRN, to work towards established goals. Pt may be seen by PTA to carry out plan of care.     Isela Frederick, PTA   05/29/2018

## 2018-06-01 ENCOUNTER — CLINICAL SUPPORT (OUTPATIENT)
Dept: REHABILITATION | Facility: HOSPITAL | Age: 63
End: 2018-06-01
Attending: PHYSICAL MEDICINE & REHABILITATION
Payer: MEDICARE

## 2018-06-01 ENCOUNTER — DOCUMENTATION ONLY (OUTPATIENT)
Dept: REHABILITATION | Facility: HOSPITAL | Age: 63
End: 2018-06-01

## 2018-06-01 DIAGNOSIS — M25.60 DECREASED RANGE OF MOTION: Primary | ICD-10-CM

## 2018-06-01 DIAGNOSIS — Z78.9 IMPAIRED MOTOR CONTROL: ICD-10-CM

## 2018-06-01 DIAGNOSIS — Z74.09 IMPAIRED FUNCTIONAL MOBILITY, BALANCE, GAIT, AND ENDURANCE: ICD-10-CM

## 2018-06-01 PROCEDURE — G8979 MOBILITY GOAL STATUS: HCPCS | Mod: CI,PO | Performed by: PHYSICAL THERAPIST

## 2018-06-01 PROCEDURE — G8978 MOBILITY CURRENT STATUS: HCPCS | Mod: CJ,PO | Performed by: PHYSICAL THERAPIST

## 2018-06-01 PROCEDURE — 97112 NEUROMUSCULAR REEDUCATION: CPT | Mod: PO | Performed by: PHYSICAL THERAPIST

## 2018-06-01 PROCEDURE — 97116 GAIT TRAINING THERAPY: CPT | Mod: PO | Performed by: PHYSICAL THERAPIST

## 2018-06-01 NOTE — PLAN OF CARE
Physical Therapy Progress Note     Name: Lacey Montana  [facility] Number: [id number]  Diagnosis:   Encounter Diagnoses   Name Primary?    Impaired functional mobility, balance, gait, and endurance     Impaired motor control      Physician: Juan Pablo Anaya MD  Treatment Orders: PT Evaluation and Treatment  Past Medical History:   Diagnosis Date    Anticoagulant long-term use     Encounter for blood transfusion     GERD (gastroesophageal reflux disease)     Seizures     Stroke        Precautions: universal  Visit #: 10  Date of Eval: 5/01/18  Plan of Care Expiration: 8/7/18    Functional Limitations Reports - G Codes  Category: mobility   Tool: tinetti, TUG, SSWS  Score: 24/28, 14.6 sec with SBQC, 0.86 m/s with SBQC    G codes 10/10    eval CJ-CI   6/1/18 CJ-CI         Subjective   Pt reports: no new complaints. Pt reports difficulty flexing L knee and maintaining flexion during HEP at home.   Pain Scale:  Slight headache    Objective     Patient received individual therapy with activities as follows:       pt participated in gait training x 20 minutes to address motor control and coordination to improve all mobility and balance:   L DF PROM= 6 deg  TUG with SBQC= 14. 6 sec   SSWS with SBQC= 6m in 6.9 sec= 0.86 m/s    Tinetti Balance Assessment  Balance:  1. Sitting Balance 1   Leans/ slides in chair= 0   Steady= 1  2. Rises from chair 2   Unable without help= 0   Able, uses arms= 1   Able without use of arms= 2  3. Attempts to rise 2   Unable without help= 0   Able, >1 attmept required-= 1   Able, 1 attempt= 2  4. Immediate standing balance (1st 5 seconds) 2   Unsteady (swaggers, moves feet, trunk sway)= 0   Steady but uses walker or other support= 1   Narrow base of support without walker or support= 2  5. Nudged 2   Begins to fall= 0   Staggers, grabs, catches self= 1   Steady= 2  6. Standing balance 2   Unsteady= 0   Steady but wide FELICIA or uses  AD=1   Narrow FELICIA without AD=2  7. Eyes closed 1   Unsteady= 0   Steady= 1  8. Turning 360 degrees 2   Discontinuous steps= 0   Continuous steps= 1   Unsteady= 0   Steady= 1  9. Sitting down 2   Unsafe= 0   Uses arms or not in a smooth motion= 1   Safe, smooth motion= 2  Balance score= 16/16  Gait:  1. Initiation 1   hesitates or multiple attempts to start= 0   No hesitancy= 1  2. Step length 2   Step to= 0   One foot passes= 1   reciprocal pattern= 2  3. Step height 2    Neither foot clears floor= 0   One foot clears floor= 1   Both feet clear floor= 2  4. Step symmetry 0   Not symmetrical= 0   Appears symmetrical= 1  5. Step continuity 1   Not continuous= 0   Appears continuous= 1  6. Path 1   Marked deviation= 0   Mild/moderate deviation or uses A.D.= 1   Straight without A.D.= 2  7. Trunk 0   Marked sway or uses A.D.= 0   No sway, but flexes knees or back, spread arms out while walking= 1   No sway, no flexion, no use of UE, no use of A.D.= 2  8. Walking stance 1   Heels apart= 0   Heels almost touching while walking= 1  Gait score= 8/12  Total score= 24/28 , minimal fall risk    pt participated in neuromuscular re education x 25 minutes to address motor control and coordination to improve all mobility and balance:   EOM:  Restoration of arches of L hand  6 basic wrist stretches: 1. P-A glide of scaphoid on radius, 2. Radial deviation,    3. Increasing mobility of metacarpals, 4. Carpal roll, 5. Movement of forearm on  hand towards wrist extension, 6. Movement of forearm on the hand toward  supination/ pronation  Romero gate splint applied to L    DF stretch with wedge with restoration of arches + toe extension    X 60 reps of L HS curls with creeper, min A to prevent IV/DF of foot    pregait activities with RW + docking station:    Stepping forward with RLE- good balance   Step tap, RLE on tall cone with min A for L knee control  ambulation with RW + docking station, L AFO 260ft with min A to decrease L pelvic  rotation        Written Home Exercises:   Pt demo good understanding of the education provided including: bridges with ball squeeze, internal rotation hip stretch and PPT. Lacey demonstrated good return demonstration of activities.     Education provided re: POC, HEP  No spiritual or educational barriers to learning provided    Pt has no cultural, educational or language barriers to learning provided.    Assessment   Assessment period: 5/1/18 to 6/1/18. Lacey tolerates treatments well. Pt met 4/6 STGs and 1 LTG. Pt demonstrates improved L DF ROM but is limited to <10 degrees (needed for correcting gait). Pt demonstrates decreased LLE ER throughout gait cycle (was fully rotated at eval). Pt demonstrates ~25% LLE ER noted to initiate at L terminal stance and remain throughout initial contact to stance. PT is performing gait training with RW to address. Improved L knee control is noted, but hyperextension is noted when pt attempts to increase speed or becomes distracted. Pt is making excellent progress with PT and met goals for improving balance to decrease fall risk. Pt requires continues skilled PT to address remaining motor control, ROM, and balance impairments to correct gait. Skin integrity of L hand was intact after removal of splint, no adverse reactions noted.   Pt can benefit from continued skilled PT to address impairments to improve quality of mobility to improve balance, decrease fall risk, and decrease possibility of secondary impairments (i.e. OA). Pt demonstrates a 26% (-12%) average level of impairment for mobility, pt is anticipated to improve impairment level to 15% by d/c.      Anticipated barriers to progress:length of time since onset of impairments, seizures, recent medication changes, recent onset of dizziness    Impairment List:  Fall Risk - impaired balance   Weakness   Impaired motor control  Decreased ROM  Gait deviations   Decreased ambulation   Decreased activity tolerance    Difficulty participating in daily activities   Difficulty in participating in role as caregiver    Requires skilled supervision to complete and progress HEP   Requires skilled intervention and recommendations for AD/ orthotics    GOALS:   Short term goals: 3-4 weeks, pt agrees to goals set.  1. Pt will perform HEP for basic strengthening and ROM with supervision to improve carryover of progress. Ongoing- pt reports difficulty with maintaining L knee flexion during supine exercises  2. Pt will demonstrate improved L DF to 4 degrees to improve foot clearance to decreased LLE ER during gait. Met 6/1/18- 6 deg PROM  3. Pt will demonstrate improved gait and mobility by demonstrating TUG with SBQC in 15 sec. Met 6/1/18- 14.6 sec with SBQC  4. Pt will demonstrate improved motor control and use of LLE for mobility by performing 5 times sit<>stand test in 10 sec with UE use as needed. NT  5. Pt will demonstrate improved mobility/ gait and decreased fall risk from high to moderate by scoring 19/28 on Tinetti Assessment. Met 6/1/18- 24/28  6. Make appropriate recommendations for DME as needed. Met 6/1/18- pt provided with list of DME vendors, has prescription for RW/ or quad cane     Long term goals: 6 weeks, pt agrees to goals set  7. Pt will demonstrate improved mobility and decreased fall risk by performing TUG with appropriate AD in 12 sec.   8. Pt will demonstrate improved L DF to 10 degrees to demonstrate improved resting position/ mobility of foot/ ankle and improve foot clearance during gait.   9. Pt will demonstrate improved mobility/ gait by scoring 22/28 on Tinetti Assessment. Met 6/1/18- 24/28  10. Pt will ambulate with appropriate AD for 150 ft without significant ER of LLE to demonstrate improved gait quality and reduce abnormal motions which may cause a secondary impairment of LBP. Improving- pt demonstrates ~25% ER of LLE throughout gait cycle  11. Pt will demonstrate improved gait velocity to 1.14 m/s with  appropraite AD to demonstrate a 25% improvement in gait.      Plan   POC extended x 8 weeks to allow for continued progress towards goals. Continue PT 2x weekly under established Plan of Care, with treatment to include: pt education, HEP, therapeutic exercises, neuromuscular re-education/balance exercises, therapeutic activities, joint mobilizations, and modalities PRN, to work towards established goals. Pt may be seen by PTA to carry out plan of care.     Monica Mosley, PT   06/01/2018

## 2018-06-01 NOTE — PROGRESS NOTES
I, Monica Mosley, DPT, met with Nic Bazan PTA, to discuss this pt's POC. Perform stretching and paddle application to L hand. Perform L DF stretching with wedge + restoration of arches of foot. Pt demonstrates poor use of LUE and motor control for scooting hips. Perform pre gait activities to improve pt's ability to support body with RLE- may use RW with docking station for paddled L hand.    Monica Mosley DPT  6/1/2018

## 2018-06-08 ENCOUNTER — TELEPHONE (OUTPATIENT)
Dept: FAMILY MEDICINE | Facility: CLINIC | Age: 63
End: 2018-06-08

## 2018-06-08 NOTE — TELEPHONE ENCOUNTER
----- Message from Iram Mcintosh sent at 6/8/2018  4:16 PM CDT -----  Contact: pt            Name of Who is Calling: pt      What is the request in detail: pt returned the nurse's phone call. Call pt      Can the clinic reply by [facility]: no      What Number to Call Back if not in [facility]: [phone]

## 2018-06-08 NOTE — TELEPHONE ENCOUNTER
Patient was given contact information for [facility] on [address].Patient will call to schedule an appointment for tomorrow regarding black stools.

## 2018-06-08 NOTE — TELEPHONE ENCOUNTER
Patient was left a detailed message to return my call.  Dominique please call to see if patient can be seen by urgent care tomorrow.Thanks.

## 2018-06-08 NOTE — TELEPHONE ENCOUNTER
----- Message from Maria Del Carmen Jorgensen sent at 6/8/2018 11:07 AM CDT -----  Contact: RODNEY CLIFFORD [[id number]]    Name of Who is Calling: RODNEY CLIFFORD [[id number]]    What is the request in detail: pt would like a call back states her stool is black and she would like to discuss with clinical staff     Can the clinic reply by [facility]: no      What Number to Call Back if not in [facility]: [phone]

## 2018-06-13 ENCOUNTER — CLINICAL SUPPORT (OUTPATIENT)
Dept: REHABILITATION | Facility: HOSPITAL | Age: 63
End: 2018-06-13
Attending: PHYSICAL MEDICINE & REHABILITATION
Payer: MEDICARE

## 2018-06-13 DIAGNOSIS — Z78.9 IMPAIRED MOTOR CONTROL: ICD-10-CM

## 2018-06-13 DIAGNOSIS — Z74.09 IMPAIRED FUNCTIONAL MOBILITY, BALANCE, GAIT, AND ENDURANCE: ICD-10-CM

## 2018-06-13 PROCEDURE — 97110 THERAPEUTIC EXERCISES: CPT | Mod: PO

## 2018-06-13 PROCEDURE — 97112 NEUROMUSCULAR REEDUCATION: CPT | Mod: PO

## 2018-06-13 NOTE — PROGRESS NOTES
"                                                    Physical Therapy Progress Note     Name: Lacey Montana  Clinic Number: [id number]  Diagnosis:   Encounter Diagnoses   Name Primary?    Impaired functional mobility, balance, gait, and endurance     Impaired motor control      Physician: Juan Pablo Anaya MD  Treatment Orders: PT Evaluation and Treatment  Past Medical History:   Diagnosis Date    Anticoagulant long-term use     Encounter for blood transfusion     GERD (gastroesophageal reflux disease)     Seizures     Stroke        Precautions: universal  Visit #: 11  Date of Eval: 5/01/18  Plan of Care Expiration: 8/7/18    Functional Limitations Reports - G Codes  Category: mobility   Tool: tinetti, TUG, SSWS  Score: 17/28, 19.1 sec with LBQC, 0.91 m/s with LBQC    G codes 1/10    eval CJ-CI         Subjective   Pt reports: " I having some left big toe pain.  It just started this morning."  Pain Scale:  7/10 Left big toe.    Objective     Patient received individual therapy with activities as follows:   Therapeutic exercises to increase strength and endurance x 10 min including:    X 8 min on Sci Fit recumbent stepper.  Level 4.0    pt participated in neuromuscular re education x 35 minutes to address motor control and coordination to improve all mobility and balance:     EOM:  Restoration of arches of L hand  6 basic wrist stretches: 1. P-A glide of scaphoid on radius, 2. Radial deviation,    3. Increasing mobility of metacarpals, 4. Carpal roll, 5. Movement of forearm on  hand towards wrist extension, 6. Movement of forearm on the hand toward  supination/ pronation  Paddle splint applied to L hand  45 degree wrist extension brace applied to L    DF stretch with wedge with restoration of arches + toe extension  BAPS board L2 DF/PF x 30 reps    X 30 reps of HS curls with creeper.  Pt required anti slip pad on top of creeper and Min A for control of LLE.          Written Home Exercises:   Pt demo good " understanding of the education provided including: bridges with ball squeeze, internal rotation hip stretch and PPT. Lacey demonstrated good return demonstration of activities.     Education provided re: POC, HEP  No spiritual or educational barriers to learning provided    Pt has no cultural, educational or language barriers to learning provided.    Assessment   Lacey tolerated treatment well and did not have any problems.  Pt with some left great toe pain but was able to complete entire tx session.  Left toe was normal color with no swelling or redness, but looks like it could possibly an ingrown toe nail.   Pt with increased resistance on the stepper and cont Min A for HS curls with the creeper for control. .  No rest  Pt can benefit from continued skilled PT to address impairments to improve quality of mobility to improve balance, decrease fall risk, and decrease possibility of secondary impairments (i.e. OA).     Anticipated barriers to progress:length of time since onset of impairments, seizures, recent medication changes, recent onset of dizziness    Impairment List:  Fall Risk - impaired balance   Weakness   Impaired motor control  Decreased ROM  Gait deviations   Decreased ambulation   Decreased activity tolerance   Difficulty participating in daily activities   Difficulty in participating in role as caregiver    Requires skilled supervision to complete and progress HEP   Requires skilled intervention and recommendations for AD/ orthotics    GOALS:   Short term goals: 3-4 weeks, pt agrees to goals set.  1. Pt will perform HEP for basic strengthening and ROM with supervision to improve carryover of progress.   2. Pt will demonstrate improved L DF to 4 degrees to improve foot clearance to decreased LLE ER during gait.   3. Pt will demonstrate improved gait and mobility by demonstrating TUG with SBQC in 15 sec.   4. Pt will demonstrate improved motor control and use of LLE for mobility by performing 5  times sit<>stand test in 10 sec with UE use as needed.   5. Pt will demonstrate improved mobility/ gait and decreased fall risk from high to moderate by scoring 19/28 on Tinetti Assessment.   6. Make appropriate recommendations for DME as needed.      Long term goals: 6 weeks, pt agrees to goals set  7. Pt will demonstrate improved mobility and decreased fall risk by performing TUG with appropriate AD in 12 sec.   8. Pt will demonstrate improved L DF to 10 degrees to demonstrate improved resting position/ mobility of foot/ ankle and improve foot clearance during gait.   9. Pt will demonstrate improved mobility/ gait by scoring 22/28 on Tinetti Assessment.   10. Pt will ambulate with appropriate AD for 150 ft without significant ER of LLE to demonstrate improved gait quality and reduce abnormal motions which may cause a secondary impairment of LBP.   11. Pt will demonstrate improved gait velocity to 1.14 m/s with appropraite AD to demonstrate a 25% improvement in gait.      Plan   Continue PT 2x weekly under established Plan of Care, with treatment to include: pt education, HEP, therapeutic exercises, neuromuscular re-education/balance exercises, therapeutic activities, joint mobilizations, and modalities PRN, to work towards established goals. Pt may be seen by PTA to carry out plan of care.     Isela Frederick, PTA   06/13/2018

## 2018-06-15 ENCOUNTER — CLINICAL SUPPORT (OUTPATIENT)
Dept: REHABILITATION | Facility: HOSPITAL | Age: 63
End: 2018-06-15
Attending: PHYSICAL MEDICINE & REHABILITATION
Payer: MEDICARE

## 2018-06-15 DIAGNOSIS — Z78.9 IMPAIRED MOTOR CONTROL: ICD-10-CM

## 2018-06-15 DIAGNOSIS — Z74.09 IMPAIRED FUNCTIONAL MOBILITY, BALANCE, GAIT, AND ENDURANCE: ICD-10-CM

## 2018-06-15 PROCEDURE — 97112 NEUROMUSCULAR REEDUCATION: CPT | Mod: PO

## 2018-06-15 PROCEDURE — 97110 THERAPEUTIC EXERCISES: CPT | Mod: PO

## 2018-06-15 NOTE — PROGRESS NOTES
"                                                    Physical Therapy Progress Note     Name: Lacey Montana  Clinic Number: [id number]  Diagnosis:   Encounter Diagnoses   Name Primary?    Impaired functional mobility, balance, gait, and endurance     Impaired motor control      Physician: Juan Pablo Anaya MD  Treatment Orders: PT Evaluation and Treatment  Past Medical History:   Diagnosis Date    Anticoagulant long-term use     Encounter for blood transfusion     GERD (gastroesophageal reflux disease)     Seizures     Stroke        Precautions: universal  Visit #: 12  Date of Eval: 5/01/18  Plan of Care Expiration: 8/7/18    Functional Limitations Reports - G Codes  Category: mobility   Tool: tinetti, TUG, SSWS  Score: 17/28, 19.1 sec with LBQC, 0.91 m/s with LBQC    G codes 2/10    eval CJ-CI         Subjective   Pt reports: "This toe is still bothering me.  I haven't gotten a chance to fool with it."   Pain Scale: 7/10 L great toe    Objective     Patient received individual therapy with activities as follows:   Therapeutic exercises to increase strength and endurance x 10 min including:    X 8 min on Sci Fit recumbent stepper.  Level 4.0    pt participated in neuromuscular re education x 35 minutes to address motor control and coordination to improve all mobility and balance:     EOM:  Restoration of arches of L hand  6 basic wrist stretches: 1. P-A glide of scaphoid on radius, 2. Radial deviation,    3. Increasing mobility of metacarpals, 4. Carpal roll, 5. Movement of forearm on  hand towards wrist extension, 6. Movement of forearm on the hand toward  supination/ pronation  Paddle splint applied to L hand  45 degree wrist extension brace applied to L    Supine:  3 x 30 sec of IR stretch  X 30 reps of AAROM IR   X 30 reps of bridges with 3 sec hold.  Min A to position LLE    Supine to prone with CGA    Prone HS curls with AAROM x 30 reps on LLE.         Written Home Exercises:   Pt demo good " understanding of the education provided including: bridges with ball squeeze, internal rotation hip stretch and PPT. Lacey demonstrated good return demonstration of activities.     Education provided re: POC, HEP  No spiritual or educational barriers to learning provided    Pt has no cultural, educational or language barriers to learning provided.    Assessment   Lacey tolerated treatment well and did not have any problems.  Pt with increased L hyperextension noted and focused mostly on L HS strengthening.  Tolerated jean gate splint x 30 mins with no adverse effects.   Pt would benefit from OT orders for UE impairments.    Pt can benefit from continued skilled PT to address impairments to improve quality of mobility to improve balance, decrease fall risk, and decrease possibility of secondary impairments (i.e. OA).     Anticipated barriers to progress:length of time since onset of impairments, seizures, recent medication changes, recent onset of dizziness    Impairment List:  Fall Risk - impaired balance   Weakness   Impaired motor control  Decreased ROM  Gait deviations   Decreased ambulation   Decreased activity tolerance   Difficulty participating in daily activities   Difficulty in participating in role as caregiver    Requires skilled supervision to complete and progress HEP   Requires skilled intervention and recommendations for AD/ orthotics    GOALS:   Short term goals: 3-4 weeks, pt agrees to goals set.  1. Pt will perform HEP for basic strengthening and ROM with supervision to improve carryover of progress.   2. Pt will demonstrate improved L DF to 4 degrees to improve foot clearance to decreased LLE ER during gait.   3. Pt will demonstrate improved gait and mobility by demonstrating TUG with SBQC in 15 sec.   4. Pt will demonstrate improved motor control and use of LLE for mobility by performing 5 times sit<>stand test in 10 sec with UE use as needed.   5. Pt will demonstrate improved mobility/  gait and decreased fall risk from high to moderate by scoring 19/28 on Tinetti Assessment.   6. Make appropriate recommendations for DME as needed.      Long term goals: 6 weeks, pt agrees to goals set  7. Pt will demonstrate improved mobility and decreased fall risk by performing TUG with appropriate AD in 12 sec.   8. Pt will demonstrate improved L DF to 10 degrees to demonstrate improved resting position/ mobility of foot/ ankle and improve foot clearance during gait.   9. Pt will demonstrate improved mobility/ gait by scoring 22/28 on Tinetti Assessment.   10. Pt will ambulate with appropriate AD for 150 ft without significant ER of LLE to demonstrate improved gait quality and reduce abnormal motions which may cause a secondary impairment of LBP.   11. Pt will demonstrate improved gait velocity to 1.14 m/s with appropraite AD to demonstrate a 25% improvement in gait.      Plan   Continue PT 2x weekly under established Plan of Care, with treatment to include: pt education, HEP, therapeutic exercises, neuromuscular re-education/balance exercises, therapeutic activities, joint mobilizations, and modalities PRN, to work towards established goals. Pt may be seen by PTA to carry out plan of care.     Isela Frederick, PTA   06/15/2018

## 2018-06-19 ENCOUNTER — CLINICAL SUPPORT (OUTPATIENT)
Dept: REHABILITATION | Facility: HOSPITAL | Age: 63
End: 2018-06-19
Attending: PHYSICAL MEDICINE & REHABILITATION
Payer: MEDICARE

## 2018-06-19 DIAGNOSIS — Z74.09 IMPAIRED FUNCTIONAL MOBILITY, BALANCE, GAIT, AND ENDURANCE: ICD-10-CM

## 2018-06-19 DIAGNOSIS — Z78.9 IMPAIRED MOTOR CONTROL: ICD-10-CM

## 2018-06-19 PROCEDURE — 97112 NEUROMUSCULAR REEDUCATION: CPT | Mod: PO

## 2018-06-19 PROCEDURE — 97110 THERAPEUTIC EXERCISES: CPT | Mod: PO

## 2018-06-19 NOTE — PROGRESS NOTES
Physical Therapy Progress Note     Name: Lacey Montana  Clinic Number: [id number]  Diagnosis:   Encounter Diagnoses   Name Primary?    Impaired functional mobility, balance, gait, and endurance     Impaired motor control      Physician: Juan Pablo Anaya MD  Treatment Orders: PT Evaluation and Treatment  Past Medical History:   Diagnosis Date    Anticoagulant long-term use     Encounter for blood transfusion     GERD (gastroesophageal reflux disease)     Seizures     Stroke        Precautions: universal  Visit #: 13  Date of Eval: 5/01/18  Plan of Care Expiration: 8/7/18    Functional Limitations Reports - G Codes  Category: mobility   Tool: tinetti, TUG, SSWS  Score: 17/28, 19.1 sec with LBQC, 0.91 m/s with LBQC    G codes 2/10    eval CJ-CI         Subjective   Pt reports: That she cut the ingrown toenail that was bothering her on her L great toe; also stated that L knee keeps on snapping back at times.    Pain Scale: 4/10 L shoulder    Objective     Patient received individual therapy with activities as follows:   Therapeutic exercises to increase strength and endurance for 10 min including:    X 8 min on Nu-Step recumbent stepper.  Level 4.0    pt participated in neuromuscular re education x 35 minutes to address motor control and coordination to improve all mobility and balance:     EOM:  DF stretch with wedge with restoration of arches + toe extension  X 30 reps of HS curls with creeper.  Pt required anti slip pad on top of creeper and Min A for control of LLE.      Supine:  3 x 30 sec of IR stretch  X 30 reps of AAROM IR   X 30 reps of bridges with 3 sec hold.  Min A to position LLE    Standing:  Weight shifting with Min A to correct LLE external rotation.     Machine:  Unilateral leg press LLE only 10 x with 80#    Not performed:  Restoration of arches of L hand  6 basic wrist stretches: 1. P-A glide of scaphoid on radius, 2. Radial deviation,     3. Increasing mobility of metacarpals, 4. Carpal roll, 5. Movement of forearm on  hand towards wrist extension, 6. Movement of forearm on the hand toward  supination/ pronation  Paddle splint applied to L hand  45 degree wrist extension brace applied to L      Written Home Exercises:   Pt demo good understanding of the education provided including: bridges with ball squeeze, internal rotation hip stretch and PPT. Lacey demonstrated good return demonstration of activities.     Education provided re: POC, HEP  No spiritual or educational barriers to learning provided    Pt has no cultural, educational or language barriers to learning provided.    Assessment   Lacey tolerated treatment well and did not have any problems. Pt continued to demonstrate lack of quad control and HS weakness as L knee hyperextension noted.  Pt would benefit from continued LLE/L hip stretches and L HS/quad strengthening. Pt would benefit from OT orders for UE impairments. Pt can benefit from continued skilled PT to address impairments to improve quality of mobility to improve balance, decrease fall risk, and decrease possibility of secondary impairments (i.e. OA).     Anticipated barriers to progress:length of time since onset of impairments, seizures, recent medication changes, recent onset of dizziness    Impairment List:  Fall Risk - impaired balance   Weakness   Impaired motor control  Decreased ROM  Gait deviations   Decreased ambulation   Decreased activity tolerance   Difficulty participating in daily activities   Difficulty in participating in role as caregiver    Requires skilled supervision to complete and progress HEP   Requires skilled intervention and recommendations for AD/ orthotics    GOALS:   Short term goals: 3-4 weeks, pt agrees to goals set.  1. Pt will perform HEP for basic strengthening and ROM with supervision to improve carryover of progress.   2. Pt will demonstrate improved L DF to 4 degrees to improve foot  clearance to decreased LLE ER during gait.   3. Pt will demonstrate improved gait and mobility by demonstrating TUG with SBQC in 15 sec.   4. Pt will demonstrate improved motor control and use of LLE for mobility by performing 5 times sit<>stand test in 10 sec with UE use as needed.   5. Pt will demonstrate improved mobility/ gait and decreased fall risk from high to moderate by scoring 19/28 on Tinetti Assessment.   6. Make appropriate recommendations for DME as needed.      Long term goals: 6 weeks, pt agrees to goals set  7. Pt will demonstrate improved mobility and decreased fall risk by performing TUG with appropriate AD in 12 sec.   8. Pt will demonstrate improved L DF to 10 degrees to demonstrate improved resting position/ mobility of foot/ ankle and improve foot clearance during gait.   9. Pt will demonstrate improved mobility/ gait by scoring 22/28 on Tinetti Assessment.   10. Pt will ambulate with appropriate AD for 150 ft without significant ER of LLE to demonstrate improved gait quality and reduce abnormal motions which may cause a secondary impairment of LBP.   11. Pt will demonstrate improved gait velocity to 1.14 m/s with appropraite AD to demonstrate a 25% improvement in gait.      Plan   Continue PT 2x weekly under established Plan of Care, with treatment to include: pt education, HEP, therapeutic exercises, neuromuscular re-education/balance exercises, therapeutic activities, joint mobilizations, and modalities PRN, to work towards established goals. Pt may be seen by PTA to carry out plan of care.     Nic Weiss, PTA   06/19/2018

## 2018-06-26 ENCOUNTER — CLINICAL SUPPORT (OUTPATIENT)
Dept: REHABILITATION | Facility: HOSPITAL | Age: 63
End: 2018-06-26
Attending: PHYSICAL MEDICINE & REHABILITATION
Payer: MEDICARE

## 2018-06-26 DIAGNOSIS — Z78.9 IMPAIRED MOTOR CONTROL: ICD-10-CM

## 2018-06-26 DIAGNOSIS — Z74.09 IMPAIRED FUNCTIONAL MOBILITY, BALANCE, GAIT, AND ENDURANCE: ICD-10-CM

## 2018-06-26 PROCEDURE — 97112 NEUROMUSCULAR REEDUCATION: CPT | Mod: PO

## 2018-06-26 PROCEDURE — 97110 THERAPEUTIC EXERCISES: CPT | Mod: PO

## 2018-06-26 NOTE — PROGRESS NOTES
Physical Therapy Progress Note     Name: Lacey Montana  Clinic Number: [id number]  Diagnosis:   Encounter Diagnoses   Name Primary?    Impaired functional mobility, balance, gait, and endurance     Impaired motor control      Physician: Juan Pablo Anaya MD  Treatment Orders: PT Evaluation and Treatment  Past Medical History:   Diagnosis Date    Anticoagulant long-term use     Encounter for blood transfusion     GERD (gastroesophageal reflux disease)     Seizures     Stroke        Precautions: universal  Visit #: 14  Date of Eval: 5/01/18  Plan of Care Expiration: 8/7/18    Functional Limitations Reports - G Codes  Category: mobility   Tool: tinetti, TUG, SSWS  Score: 17/28, 19.1 sec with LBQC, 0.91 m/s with LBQC    G codes 3/10    eval CJ-CI         Subjective   Pt reports: swelling in both L knee and ankle since last week.   Pain Scale: min discomfort in L shoulder     Objective     Patient received individual therapy with activities as follows:  Therapeutic exercises to increase strength and endurance for 10 min including:    X 8 min on Nu-Step recumbent stepper.  Level 4.0    pt participated in neuromuscular re education x 35 minutes to address motor control and coordination to improve all mobility and balance:     EOM:  DF stretch with wedge with restoration of arches + toe extension  X 30 reps of HS curls with creeper.  Pt required anti slip pad on top of creeper and Min A for control of LLE.      Supine:  3 x 30 sec of IR stretch  X 30 reps of AAROM IR   X 30 reps of bridges with 3 sec hold.  Min A to position LLE    Standing:  Weight shifting with Min A to correct LLE external rotation  Push off though LLE into hip and knee flexion with Min A    Machine:  Unilateral leg press LLE only 2 x 10 with 80#    Not performed:  Restoration of arches of L hand  6 basic wrist stretches: 1. P-A glide of scaphoid on radius, 2. Radial deviation,    3.  Increasing mobility of metacarpals, 4. Carpal roll, 5. Movement of forearm on  hand towards wrist extension, 6. Movement of forearm on the hand toward  supination/ pronation  Paddle splint applied to L hand  45 degree wrist extension brace applied to L      Written Home Exercises:   Pt demo good understanding of the education provided including: bridges with ball squeeze, internal rotation hip stretch and PPT. Lacey demonstrated good return demonstration of activities.     Education provided re: POC, HEP  No spiritual or educational barriers to learning provided    Pt has no cultural, educational or language barriers to learning provided.    Assessment     It was noted that presented with L knee/ankle  swelling upon arrival. Even though, pt has had significant gait improvements, pt continues to be require L hip and LLE flexibility. Pt also continued to demonstrate lack of quad control and HS weakness as L knee hyperextension noted.  Pt would benefit from continued LLE/L hip stretches and L HS/quad strengthening. Pt would benefit from OT orders for UE impairments. Pt can benefit from continued skilled PT to address impairments to improve quality of mobility to improve balance, decrease fall risk, and decrease possibility of secondary impairments (i.e. OA).     Anticipated barriers to progress:length of time since onset of impairments, seizures, recent medication changes, recent onset of dizziness    Impairment List:  Fall Risk - impaired balance   Weakness   Impaired motor control  Decreased ROM  Gait deviations   Decreased ambulation   Decreased activity tolerance   Difficulty participating in daily activities   Difficulty in participating in role as caregiver    Requires skilled supervision to complete and progress HEP   Requires skilled intervention and recommendations for AD/ orthotics    GOALS:   Short term goals: 3-4 weeks, pt agrees to goals set.  1. Pt will perform HEP for basic strengthening and ROM with  supervision to improve carryover of progress.   2. Pt will demonstrate improved L DF to 4 degrees to improve foot clearance to decreased LLE ER during gait.   3. Pt will demonstrate improved gait and mobility by demonstrating TUG with SBQC in 15 sec.   4. Pt will demonstrate improved motor control and use of LLE for mobility by performing 5 times sit<>stand test in 10 sec with UE use as needed.   5. Pt will demonstrate improved mobility/ gait and decreased fall risk from high to moderate by scoring 19/28 on Tinetti Assessment.   6. Make appropriate recommendations for DME as needed.      Long term goals: 6 weeks, pt agrees to goals set  7. Pt will demonstrate improved mobility and decreased fall risk by performing TUG with appropriate AD in 12 sec.   8. Pt will demonstrate improved L DF to 10 degrees to demonstrate improved resting position/ mobility of foot/ ankle and improve foot clearance during gait.   9. Pt will demonstrate improved mobility/ gait by scoring 22/28 on Tinetti Assessment.   10. Pt will ambulate with appropriate AD for 150 ft without significant ER of LLE to demonstrate improved gait quality and reduce abnormal motions which may cause a secondary impairment of LBP.   11. Pt will demonstrate improved gait velocity to 1.14 m/s with appropraite AD to demonstrate a 25% improvement in gait.      Plan   Continue PT 2x weekly under established Plan of Care, with treatment to include: pt education, HEP, therapeutic exercises, neuromuscular re-education/balance exercises, therapeutic activities, joint mobilizations, and modalities PRN, to work towards established goals. Pt may be seen by PTA to carry out plan of care.     Nic Weiss, PTA   06/26/2018

## 2018-06-29 ENCOUNTER — CLINICAL SUPPORT (OUTPATIENT)
Dept: REHABILITATION | Facility: HOSPITAL | Age: 63
End: 2018-06-29
Attending: PHYSICAL MEDICINE & REHABILITATION
Payer: MEDICARE

## 2018-06-29 DIAGNOSIS — Z74.09 IMPAIRED FUNCTIONAL MOBILITY, BALANCE, GAIT, AND ENDURANCE: ICD-10-CM

## 2018-06-29 DIAGNOSIS — M21.372 LEFT FOOT DROP: ICD-10-CM

## 2018-06-29 DIAGNOSIS — Z86.73 HISTORY OF COMPLETED STROKE: Primary | ICD-10-CM

## 2018-06-29 DIAGNOSIS — M54.41 CHRONIC MIDLINE LOW BACK PAIN WITH RIGHT-SIDED SCIATICA: ICD-10-CM

## 2018-06-29 DIAGNOSIS — M25.60 DECREASED RANGE OF MOTION: Primary | ICD-10-CM

## 2018-06-29 DIAGNOSIS — Z78.9 IMPAIRED MOTOR CONTROL: ICD-10-CM

## 2018-06-29 DIAGNOSIS — G81.94 LEFT HEMIPARESIS: ICD-10-CM

## 2018-06-29 DIAGNOSIS — R26.9 GAIT DISORDER: ICD-10-CM

## 2018-06-29 DIAGNOSIS — G89.29 CHRONIC MIDLINE LOW BACK PAIN WITH RIGHT-SIDED SCIATICA: ICD-10-CM

## 2018-06-29 PROCEDURE — 97112 NEUROMUSCULAR REEDUCATION: CPT | Mod: PO | Performed by: PHYSICAL THERAPIST

## 2018-06-29 PROCEDURE — 97530 THERAPEUTIC ACTIVITIES: CPT | Mod: PO | Performed by: PHYSICAL THERAPIST

## 2018-06-29 NOTE — PROGRESS NOTES
Physical Therapy Progress Note     Name: Lacey Montana  Clinic Number: [id number]  Diagnosis:   Encounter Diagnoses   Name Primary?    Impaired functional mobility, balance, gait, and endurance     Impaired motor control      Physician: Juan Pablo Anaya MD  Treatment Orders: PT Evaluation and Treatment  Past Medical History:   Diagnosis Date    Anticoagulant long-term use     Encounter for blood transfusion     GERD (gastroesophageal reflux disease)     Seizures     Stroke        Precautions: universal  Visit #: 14  Date of Eval: 5/01/18  Plan of Care Expiration: 8/7/18    Functional Limitations Reports - G Codes  Category: mobility   Tool: tinetti, TUG, SSWS  Score: 17/28, 19.1 sec with LBQC, 0.91 m/s with LBQC    G codes 6/10    eval CJ-CI   6/1/18 CJ-CI         Subjective   Pt reports: continued swelling in LLE, noted especially in ankle  Pain Scale: no complaints    Objective     Patient received individual therapy with activities as follows:  Therapeutic activity x 10 minutes to access pt for DVT:   LLE warm to touch, toes cool. No significant increase in warm of LLE compared to RLE  Moderate pitting edema noted  No redness noted  (-) lucian's LLE- only minimal soreness reported  Pt instructed to elevate limb upon returning home and visit MD or Urgent care if swelling increases, redness arises, pain arises, or swelling worsens    pt participated in neuromuscular re education x 35 minutes to address motor control and coordination to improve all mobility and balance:     EOM:    Restoration of arches of L hand  6 basic wrist stretches: 1. P-A glide of scaphoid on radius, 2. Radial deviation,    3. Increasing mobility of metacarpals, 4. Carpal roll, 5. Movement of forearm on  hand towards wrist extension, 6. Movement of forearm on the hand toward  supination/ pronation  Paddle splint applied to L hand  black wrist extension brace applied to  "L    Sitting L hip IR stretch with trunk rotation  DF stretch with wedge with restoration of arches + toe extension  + toe abd- midfoot pronation with forefoot abd noted  AAROM for L DF- occurs with IV and toe flex    pregait without UE support:   R forward step with L knee blocked for buckling- min- no facilitation  R step tap on 6" with mod A for weight shift, balance, min A for preventing L knee buckling    ambulation with LBQC and L  ft with min A to provide LUE/ weight bearing, otherwise supervision. Mod VC to correct LLE ER with initial contact.     NP today:   X 30 reps of HS curls with creeper.  Pt required anti slip pad on top of creeper and Min A for control of LLE.    Supine:  3 x 30 sec of IR stretch  X 30 reps of bridges with 3 sec hold.  Min A to position LLE  Standing:  Weight shifting with Min A to correct LLE external rotation  Push off though LLE into hip and knee flexion with Min A  Machine:  Unilateral leg press LLE only 2 x 10 with 80#    Written Home Exercises:   Pt demo good understanding of the education provided including: bridges with ball squeeze, internal rotation hip stretch and PPT. Lacey demonstrated good return demonstration of activities.     Education provided re: POC, HEP  No spiritual or educational barriers to learning provided    Pt has no cultural, educational or language barriers to learning provided.    Assessment   Lacey tolerated treatment well. Pt presented with moderate edema of LLE, assessed for DVT; (-) per Benjie's. Pt educated in the signs of a DVT and instructed to seek medical attention if any occur. Pt was able to tolerate increased wrist extension while in paddle splint today. Skin integrity of L hand was intact after removal of splint, no adverse reactions noted. Pt required extensive stretching of L hip external rotators and L foot to improve midline position of limb. PT addressed pregait activities and pt demonstrated significant difficulty with " support of body and full weight shift onto LLE.  Improved gait technique was noted at conclusion of session with use of LBQC, moderate verbal cues were provided to decrease LLE ER upon initial contact. Pt would benefit from continued LLE/L hip stretches and L HS/quad strengthening. Pt would benefit from OT orders for UE impairments. Pt can benefit from continued skilled PT to address impairments to improve quality of mobility to improve balance, decrease fall risk, and decrease possibility of secondary impairments (i.e. OA).     Anticipated barriers to progress:length of time since onset of impairments, seizures, recent medication changes, recent onset of dizziness    Impairment List:  Fall Risk - impaired balance   Weakness   Impaired motor control  Decreased ROM  Gait deviations   Decreased ambulation   Decreased activity tolerance   Difficulty participating in daily activities   Difficulty in participating in role as caregiver    Requires skilled supervision to complete and progress HEP   Requires skilled intervention and recommendations for AD/ orthotics    GOALS:   Short term goals: 3-4 weeks, pt agrees to goals set.  1. Pt will perform HEP for basic strengthening and ROM with supervision to improve carryover of progress. Ongoing- pt reports difficulty with maintaining L knee flexion during supine exercises  2. Pt will demonstrate improved L DF to 4 degrees to improve foot clearance to decreased LLE ER during gait. Met 6/1/18- 6 deg PROM  3. Pt will demonstrate improved gait and mobility by demonstrating TUG with SBQC in 15 sec. Met 6/1/18- 14.6 sec with SBQC  4. Pt will demonstrate improved motor control and use of LLE for mobility by performing 5 times sit<>stand test in 10 sec with UE use as needed. NT  5. Pt will demonstrate improved mobility/ gait and decreased fall risk from high to moderate by scoring 19/28 on Tinetti Assessment. Met 6/1/18- 24/28  6. Make appropriate recommendations for DME as needed.  Met 6/1/18- pt provided with list of DME vendors, has prescription for RW/ or quad cane     Long term goals: 6 weeks, pt agrees to goals set  7. Pt will demonstrate improved mobility and decreased fall risk by performing TUG with appropriate AD in 12 sec.   8. Pt will demonstrate improved L DF to 10 degrees to demonstrate improved resting position/ mobility of foot/ ankle and improve foot clearance during gait.   9. Pt will demonstrate improved mobility/ gait by scoring 22/28 on Tinetti Assessment. Met 6/1/18- 24/28  10. Pt will ambulate with appropriate AD for 150 ft without significant ER of LLE to demonstrate improved gait quality and reduce abnormal motions which may cause a secondary impairment of LBP. Improving- pt demonstrates ~25% ER of LLE throughout gait cycle  11. Pt will demonstrate improved gait velocity to 1.14 m/s with appropraite AD to demonstrate a 25% improvement in gait.     Plan   Continue PT 2x weekly under established Plan of Care, with treatment to include: pt education, HEP, therapeutic exercises, neuromuscular re-education/balance exercises, therapeutic activities, joint mobilizations, and modalities PRN, to work towards established goals. Pt may be seen by PTA to carry out plan of care.     Monica Mosley, PT   06/29/2018

## 2018-07-03 ENCOUNTER — DOCUMENTATION ONLY (OUTPATIENT)
Dept: REHABILITATION | Facility: HOSPITAL | Age: 63
End: 2018-07-03

## 2018-07-06 ENCOUNTER — CLINICAL SUPPORT (OUTPATIENT)
Dept: REHABILITATION | Facility: HOSPITAL | Age: 63
End: 2018-07-06
Attending: PHYSICAL MEDICINE & REHABILITATION
Payer: MEDICARE

## 2018-07-06 DIAGNOSIS — Z74.09 IMPAIRED FUNCTIONAL MOBILITY, BALANCE, GAIT, AND ENDURANCE: ICD-10-CM

## 2018-07-06 DIAGNOSIS — Z78.9 IMPAIRED MOTOR CONTROL: ICD-10-CM

## 2018-07-06 PROCEDURE — G8978 MOBILITY CURRENT STATUS: HCPCS | Mod: CJ,PO | Performed by: PHYSICAL THERAPIST

## 2018-07-06 PROCEDURE — G8979 MOBILITY GOAL STATUS: HCPCS | Mod: CI,PO | Performed by: PHYSICAL THERAPIST

## 2018-07-06 PROCEDURE — 97112 NEUROMUSCULAR REEDUCATION: CPT | Mod: PO | Performed by: PHYSICAL THERAPIST

## 2018-07-06 PROCEDURE — 97530 THERAPEUTIC ACTIVITIES: CPT | Mod: PO | Performed by: PHYSICAL THERAPIST

## 2018-07-06 NOTE — PLAN OF CARE
Physical Therapy Progress Note     Name: Lacey Montana  [facility] Number: [id number]  Diagnosis:   Encounter Diagnoses   Name Primary?    Impaired functional mobility, balance, gait, and endurance     Impaired motor control      Physician: Juan Pablo Anaya MD  Treatment Orders: PT Evaluation and Treatment  Past Medical History:   Diagnosis Date    Anticoagulant long-term use     Encounter for blood transfusion     GERD (gastroesophageal reflux disease)     Seizures     Stroke        Precautions: universal  Visit #: 14  Date of Eval: 5/01/18  Plan of Care Expiration: 8/7/18    Functional Limitations Reports - G Codes  Category: mobility   Tool: tinetti, TUG, SSWS  Score: 24/28, 15.7 sec with LBQC, 0.64 m/s with LBQC    G codes 6/10    eval CJ-CI   6/1/18 CJ-CI   7/6/18 CJ-CI         Subjective   Pt reports: I have been wearing  Better socks to prevent swelling  Pain Scale: no complaints    Objective     Patient received individual therapy with activities as follows:  Therapeutic activity x 10 minutes to assess status of mobility:   TUG with LBQC and L AFO= 16.8 sec + 16 sec + 14.4 sec= 15.7 sec  5 times sit<>stand= 9.6 sec with UE support  SSWS with LBQC and L AFO= 6m in 9.4 sec= 0.64 m/s  L DF AAROM= 6 deg    pt participated in neuromuscular re education x 36 minutes to address motor control and coordination to improve all mobility and balance:     EOM:    Restoration of arches of L hand  6 basic wrist stretches: 1. P-A glide of scaphoid on radius, 2. Radial deviation,    3. Increasing mobility of metacarpals, 4. Carpal roll, 5. Movement of forearm on  hand towards wrist extension, 6. Movement of forearm on the hand toward  supination/ pronation  Facilitation of support of LUE    Sitting L hip IR stretch with L lateral pelvic tilt  DF stretch with wedge with restoration of arches + toe extension  + toe abd- midfoot pronation with forefoot abd  "noted      pregait without UE support:   R step tap on 6" with min-mod A for weight shift, balance, min A for preventing L knee buckling  HS curls with pillow case. Min A to prevent ER     ambulation with shopping cart and L  ft with min A- CGA to provide LUE/ weight bearing, otherwise supervision.  Ambulation backwards with shopping cart ~50ft with mod A to prevent LLE ER    NP today:   Supine:  3 x 30 sec of IR stretch  X 30 reps of bridges with 3 sec hold.  Min A to position LLE  Standing:  Weight shifting with Min A to correct LLE external rotation  Push off though LLE into hip and knee flexion with Min A  Machine:  Unilateral leg press LLE only 2 x 10 with 80#    Written Home Exercises:   Pt demo good understanding of the education provided including: bridges with ball squeeze, internal rotation hip stretch and PPT. Lacey demonstrated good return demonstration of activities.     Education provided re: POC, HEP  No spiritual or educational barriers to learning provided    Pt has no cultural, educational or language barriers to learning provided.    Assessment   Assessment period: 6/13/18 to 7/6/18. Lacey tolerates treatments well and is demonstrating good carryover for improved gait technique. Pt demonstrates improved strength by meeting goal for 5 times sit<>stand test. Pt demonstrated a decrease in ambulation velocity with use of LBQC, but demonstrates improved gait quality. Pt demonstrates decreased LLE ER during gait cycle. Pt is able to mostly correct LLE ER upon initial contact and maintain through midstance. LLE continues to ER in terminal stance with pt unable to correct unassisted. Pt is also demonstrating improved ability to support body on LLE. Pt has difficulty maintaining a full weight shift onto LLE. This improves with practice in sessions. Improved gait technique is noted at conclusion of sessions with use of LBQC. Pt would benefit from continued LLE/L hip stretches and L HS/quad " strengthening. Pt recently received OT orders and is awaiting eval. Pt demonstrates poor ability to keep L hand open during sessions and poor use of LUE to support body.  Gait quality and mobility is limited by decreased strength, decreased motor control and abnormal muscle tone. Pt can benefit from continued skilled PT to address impairments to improve quality of mobility to improve balance, decrease fall risk, and decrease possibility of secondary impairments (i.e. OA). Pt demonstrates a 33% average level of impairment for mobility, pt is anticipated to improve impairment level to 15% by d/c.      Anticipated barriers to progress:length of time since onset of impairments, seizures, recent medication changes, recent onset of dizziness    Impairment List:  Fall Risk - impaired balance   Weakness   Impaired motor control  Decreased ROM  Gait deviations   Decreased ambulation   Decreased activity tolerance   Difficulty participating in daily activities   Difficulty in participating in role as caregiver    Requires skilled supervision to complete and progress HEP   Requires skilled intervention and recommendations for AD/ orthotics    GOALS:   Status as of 7/6/18  Short term goals: 3-4 weeks, pt agrees to goals set.  1. Pt will perform HEP for basic strengthening and ROM with supervision to improve carryover of progress. Ongoing- pt reports difficulty with maintaining L knee flexion during supine exercises  2. Pt will demonstrate improved L DF to 4 degrees to improve foot clearance to decreased LLE ER during gait. Met 6/1/18-   3. Pt will demonstrate improved gait and mobility by demonstrating TUG with SBQC in 15 sec. Met 6/1/18- 14.6 sec with SBQC  4. Pt will demonstrate improved motor control and use of LLE for mobility by performing 5 times sit<>stand test in 10 sec with UE use as needed. Met 7/6/18- 9.6 sec with UE support  5. Pt will demonstrate improved mobility/ gait and decreased fall risk from high to moderate  by scoring 19/28 on Tinetti Assessment. Met 6/1/18- 24/28  6. Make appropriate recommendations for DME as needed. Met 6/1/18- pt provided with list of DME vendors, has prescription for RW/ or quad cane     Long term goals: 6 weeks, pt agrees to goals set  7. Pt will demonstrate improved mobility and decreased fall risk by performing TUG with appropriate AD in 12 sec. ~same- 15.7 sec avg with LBQC and L AFO  8. Pt will demonstrate improved L DF to 10 degrees to demonstrate improved resting position/ mobility of foot/ ankle and improve foot clearance during gait. Same- 6 deg AAROM  9. Pt will demonstrate improved mobility/ gait by scoring 22/28 on Tinetti Assessment. Met 6/1/18- 24/28  10. Pt will ambulate with appropriate AD for 150 ft without significant ER of LLE to demonstrate improved gait quality and reduce abnormal motions which may cause a secondary impairment of LBP. Improving- pt demonstrates pt can correct ER initial contact through mid stance, ER cont to occur in terminal stance  11. Pt will demonstrate improved gait velocity to 1.14 m/s with appropraite AD to demonstrate a 25% improvement in gait. declined 0.67 m/s but improved quality noted    Plan   Continue PT 2x weekly under established Plan of Care, with treatment to include: pt education, HEP, therapeutic exercises, neuromuscular re-education/balance exercises, therapeutic activities, joint mobilizations, and modalities PRN, to work towards established goals. Pt may be seen by PTA to carry out plan of care.     Monica Msoley, PT   07/06/2018        I certify the need for these services furnished under this plan of treatment and while under my care.    Juan Pablo Anaya MD    Physician/Referring Practitioner     07/08/2018  Date of Signature

## 2018-07-12 ENCOUNTER — DOCUMENTATION ONLY (OUTPATIENT)
Dept: REHABILITATION | Facility: HOSPITAL | Age: 63
End: 2018-07-12

## 2018-07-12 NOTE — PROGRESS NOTES
I, RAFI BeanT, met with Nic Bazan PTA, to discuss this pt's POC. Pt demonstrates improved LLE ER during gait. Continue to address L DF and hip IR ROM. Perform pregait activities with emphasis on full weight shift with good knee control on RLE. Address gait with BUE support- shopping cart or RW. Pt demonstrated poor backwards gait.     RAFI BeanT

## 2018-07-13 ENCOUNTER — CLINICAL SUPPORT (OUTPATIENT)
Dept: REHABILITATION | Facility: HOSPITAL | Age: 63
End: 2018-07-13
Attending: PHYSICAL MEDICINE & REHABILITATION
Payer: MEDICARE

## 2018-07-13 DIAGNOSIS — Z78.9 IMPAIRED MOTOR CONTROL: ICD-10-CM

## 2018-07-13 DIAGNOSIS — Z74.09 IMPAIRED FUNCTIONAL MOBILITY, BALANCE, GAIT, AND ENDURANCE: ICD-10-CM

## 2018-07-13 PROCEDURE — 97116 GAIT TRAINING THERAPY: CPT | Mod: PO

## 2018-07-13 PROCEDURE — 97112 NEUROMUSCULAR REEDUCATION: CPT | Mod: PO

## 2018-07-13 NOTE — PROGRESS NOTES
"                                                      Physical Therapy Progress Note      Name: Lacey Montana  Clinic Number: [id number]  Diagnosis:        Encounter Diagnoses   Name Primary?    Impaired functional mobility, balance, gait, and endurance      Impaired motor control        Physician: Juan Pablo Anaya MD  Treatment Orders: PT Evaluation and Treatment       Past Medical History:   Diagnosis Date    Anticoagulant long-term use      Encounter for blood transfusion      GERD (gastroesophageal reflux disease)      Seizures      Stroke           Precautions: universal  Visit #: 15  Date of Eval: 5/01/18  Plan of Care Expiration: 8/7/18     Functional Limitations Reports - G Codes  Category: mobility   Tool: tinetti, TUG, SSWS  Score: 24/28, 15.7 sec with LBQC, 0.64 m/s with LBQC     G codes 7/10    eval CJ-CI   6/1/18 CJ-CI   7/6/18 CJ-CI          Subjective   Pt reports: "I have been stretching almost every other day."  Pain Scale: no complaints     Objective     pt participated in neuromuscular re education x 35 minutes to address motor control and coordination to improve all mobility and balance:      EOM:     Restoration of arches of L hand  6 basic wrist stretches: 1. P-A glide of scaphoid on radius, 2. Radial deviation,               3. Increasing mobility of metacarpals, 4. Carpal roll, 5. Movement of forearm on    hand towards wrist extension, 6. Movement of forearm on the hand toward        supination/ pronation  Facilitation of support of LUE     Sitting L hip IR stretch with L lateral pelvic tilt  DF stretch with wedge with restoration of arches + toe extension  + toe abd- midfoot pronation with forefoot abd noted        pregait without RUE support:   practicing proper push off in terminal stance with LLE with Mod A in order to prevent ER     ambulation with shopping cart and L  ft first trial and 80 ft second trial with min A- CGA to provide LUE/ weight bearing, otherwise " "supervision.  Ambulation backwards with shopping cart ~50ft with verbal cues to correct LLE ER     NP today:   R step tap on 6" with min-mod A for weight shift, balance, min A for preventing L knee buckling  HS curls with pillow case. Min A to prevent ER       Supine:  3 x 30 sec of IR stretch  X 30 reps of bridges with 3 sec hold.  Min A to position LLE  Standing:  Weight shifting with Min A to correct LLE external rotation  Push off though LLE into hip and knee flexion with Min A  Machine:  Unilateral leg press LLE only 2 x 10 with 80#     Written Home Exercises:   Pt demo good understanding of the education provided including: bridges with ball squeeze, internal rotation hip stretch and PPT. Lacey demonstrated good return demonstration of activities.      Education provided re: POC, HEP  No spiritual or educational barriers to learning provided     Pt has no cultural, educational or language barriers to learning provided.     Assessment        Pt continues to demonstrate good carryover for improved gait technique as pt exhibits less LLE ER at terminal stance and is able to mostly correct LLE ER upon initial contact and maintain through midstance. Pt, however, continues to ER in terminal stance with pt unable to correct unassisted.  Pt requires verbal cues to decrease velocity during ambulation in order to improve gait quality. Pt would benefit from addressing L DF, hip IR ROM, and pregait activities with emphasis on full weight shift with good knee control on RLE. Gait quality and mobility is limited by decreased strength, decreased motor control and abnormal muscle tone. Pt can benefit from continued skilled PT to address impairments to improve quality of mobility to improve balance, decrease fall risk, and decrease possibility of secondary impairments (i.e. OA).       Anticipated barriers to progress:length of time since onset of impairments, seizures, recent medication changes, recent onset of " dizziness     Impairment List:  Fall Risk - impaired balance   Weakness   Impaired motor control  Decreased ROM  Gait deviations   Decreased ambulation   Decreased activity tolerance   Difficulty participating in daily activities   Difficulty in participating in role as caregiver    Requires skilled supervision to complete and progress HEP   Requires skilled intervention and recommendations for AD/ orthotics     GOALS:   Status as of 7/6/18  Short term goals: 3-4 weeks, pt agrees to goals set.  1. Pt will perform HEP for basic strengthening and ROM with supervision to improve carryover of progress. Ongoing- pt reports difficulty with maintaining L knee flexion during supine exercises  2. Pt will demonstrate improved L DF to 4 degrees to improve foot clearance to decreased LLE ER during gait. Met 6/1/18-   3. Pt will demonstrate improved gait and mobility by demonstrating TUG with SBQC in 15 sec. Met 6/1/18- 14.6 sec with SBQC  4. Pt will demonstrate improved motor control and use of LLE for mobility by performing 5 times sit<>stand test in 10 sec with UE use as needed. Met 7/6/18- 9.6 sec with UE support  5. Pt will demonstrate improved mobility/ gait and decreased fall risk from high to moderate by scoring 19/28 on Tinetti Assessment. Met 6/1/18- 24/28  6. Make appropriate recommendations for DME as needed. Met 6/1/18- pt provided with list of DME vendors, has prescription for RW/ or quad cane     Long term goals: 6 weeks, pt agrees to goals set  7. Pt will demonstrate improved mobility and decreased fall risk by performing TUG with appropriate AD in 12 sec. ~same- 15.7 sec avg with LBQC and L AFO  8. Pt will demonstrate improved L DF to 10 degrees to demonstrate improved resting position/ mobility of foot/ ankle and improve foot clearance during gait. Same- 6 deg AAROM  9. Pt will demonstrate improved mobility/ gait by scoring 22/28 on Tinetti Assessment. Met 6/1/18- 24/28  10. Pt will ambulate with appropriate AD  for 150 ft without significant ER of LLE to demonstrate improved gait quality and reduce abnormal motions which may cause a secondary impairment of LBP. Improving- pt demonstrates pt can correct ER initial contact through mid stance, ER cont to occur in terminal stance  11. Pt will demonstrate improved gait velocity to 1.14 m/s with appropraite AD to demonstrate a 25% improvement in gait. declined 0.67 m/s but improved quality noted     Plan   Continue PT 2x weekly under established Plan of Care, with treatment to include: pt education, HEP, therapeutic exercises, neuromuscular re-education/balance exercises, therapeutic activities, joint mobilizations, and modalities PRN, to work towards established goals. Pt may be seen by PTA to carry out plan of care.      Nic Weiss, PTA   07/06/2018

## 2018-07-17 ENCOUNTER — CLINICAL SUPPORT (OUTPATIENT)
Dept: REHABILITATION | Facility: HOSPITAL | Age: 63
End: 2018-07-17
Attending: PHYSICAL MEDICINE & REHABILITATION
Payer: MEDICARE

## 2018-07-17 DIAGNOSIS — Z74.09 IMPAIRED FUNCTIONAL MOBILITY, BALANCE, GAIT, AND ENDURANCE: ICD-10-CM

## 2018-07-17 DIAGNOSIS — Z78.9 IMPAIRED MOTOR CONTROL: ICD-10-CM

## 2018-07-17 PROCEDURE — 97112 NEUROMUSCULAR REEDUCATION: CPT | Mod: PO

## 2018-07-17 NOTE — PROGRESS NOTES
"                                                      Physical Therapy Progress Note      Name: Lacey Montana  Clinic Number: [id number]  Diagnosis:        Encounter Diagnoses   Name Primary?    Impaired functional mobility, balance, gait, and endurance      Impaired motor control        Physician: Juan Pablo Anaya MD  Treatment Orders: PT Evaluation and Treatment       Past Medical History:   Diagnosis Date    Anticoagulant long-term use      Encounter for blood transfusion      GERD (gastroesophageal reflux disease)      Seizures      Stroke           Precautions: universal  Visit #: 16  Date of Eval: 5/01/18  Plan of Care Expiration: 8/7/18     Functional Limitations Reports - G Codes  Category: mobility   Tool: tinetti, TUG, SSWS  Score: 24/28, 15.7 sec with LBQC, 0.64 m/s with LBQC     G codes 8/10    eval CJ-CI   6/1/18 CJ-CI   7/6/18 CJ-CI          Subjective   Pt reports: "I had an insect bite in my L hand and that is why it is swollen."  Pain Scale: no complaints     Objective     pt participated in neuromuscular re education x 35 minutes to address motor control and coordination to improve all mobility and balance:      EOM:     Restoration of arches of L hand  6 basic wrist stretches: 1. P-A glide of scaphoid on radius, 2. Radial deviation,               3. Increasing mobility of metacarpals, 4. Carpal roll, 5. Movement of forearm on    hand towards wrist extension, 6. Movement of forearm on the hand toward        supination/ pronation  Facilitation of support of LUE     Sitting L hip IR stretch with L lateral pelvic tilt  DF stretch with wedge with restoration of arches + toe extension  + toe abd- midfoot pronation with forefoot abd noted     Manual internal hip rotation stretch in supine     pregait without RUE support:   practicing proper push off in terminal stance with LLE with Mod A in order to prevent ER     NP today:   R step tap on 6" with min-mod A for weight shift, balance, min A " for preventing L knee buckling  HS curls with pillow case. Min A to prevent ER       Supine:  3 x 30 sec of IR stretch  X 30 reps of bridges with 3 sec hold.  Min A to position LLE  Standing:  Weight shifting with Min A to correct LLE external rotation  Push off though LLE into hip and knee flexion with Min A  Machine:  Unilateral leg press LLE only 2 x 10 with 80#     Written Home Exercises:   Pt demo good understanding of the education provided including: bridges with ball squeeze, internal rotation hip stretch and PPT. Laecy demonstrated good return demonstration of activities.      Education provided re: POC, HEP  No spiritual or educational barriers to learning provided     Pt has no cultural, educational or language barriers to learning provided.     Assessment      It was noted that pt reported having a seizure yesterday and pt presented with decreased strength and stamina throughout today's tx session. Pt also presented with L hand inflammation upon arrival that the pt attributed to an insect bite. Pt tolerated tx well but declined to ambulate today as pt stated that she felt fatigued and wanted to rest as soon as she arrived home. Pt would benefit from addressing L DF, hip IR ROM, and pregait activities with emphasis on full weight shift with good knee control on RLE. Gait quality and mobility is limited by decreased strength, decreased motor control and abnormal muscle tone. Pt can benefit from continued skilled PT to address impairments to improve quality of mobility to improve balance, decrease fall risk, and decrease possibility of secondary impairments (i.e. OA).       Anticipated barriers to progress:length of time since onset of impairments, seizures, recent medication changes, recent onset of dizziness     Impairment List:  Fall Risk - impaired balance   Weakness   Impaired motor control  Decreased ROM  Gait deviations   Decreased ambulation   Decreased activity tolerance   Difficulty  participating in daily activities   Difficulty in participating in role as caregiver    Requires skilled supervision to complete and progress HEP   Requires skilled intervention and recommendations for AD/ orthotics     GOALS:   Status as of 7/6/18  Short term goals: 3-4 weeks, pt agrees to goals set.  1. Pt will perform HEP for basic strengthening and ROM with supervision to improve carryover of progress. Ongoing- pt reports difficulty with maintaining L knee flexion during supine exercises  2. Pt will demonstrate improved L DF to 4 degrees to improve foot clearance to decreased LLE ER during gait. Met 6/1/18-   3. Pt will demonstrate improved gait and mobility by demonstrating TUG with SBQC in 15 sec. Met 6/1/18- 14.6 sec with SBQC  4. Pt will demonstrate improved motor control and use of LLE for mobility by performing 5 times sit<>stand test in 10 sec with UE use as needed. Met 7/6/18- 9.6 sec with UE support  5. Pt will demonstrate improved mobility/ gait and decreased fall risk from high to moderate by scoring 19/28 on Tinetti Assessment. Met 6/1/18- 24/28  6. Make appropriate recommendations for DME as needed. Met 6/1/18- pt provided with list of DME vendors, has prescription for RW/ or quad cane     Long term goals: 6 weeks, pt agrees to goals set  7. Pt will demonstrate improved mobility and decreased fall risk by performing TUG with appropriate AD in 12 sec. ~same- 15.7 sec avg with LBQC and L AFO  8. Pt will demonstrate improved L DF to 10 degrees to demonstrate improved resting position/ mobility of foot/ ankle and improve foot clearance during gait. Same- 6 deg AAROM  9. Pt will demonstrate improved mobility/ gait by scoring 22/28 on Tinetti Assessment. Met 6/1/18- 24/28  10. Pt will ambulate with appropriate AD for 150 ft without significant ER of LLE to demonstrate improved gait quality and reduce abnormal motions which may cause a secondary impairment of LBP. Improving- pt demonstrates pt can correct  ER initial contact through mid stance, ER cont to occur in terminal stance  11. Pt will demonstrate improved gait velocity to 1.14 m/s with appropraite AD to demonstrate a 25% improvement in gait. declined 0.67 m/s but improved quality noted     Plan   Continue PT 2x weekly under established Plan of Care, with treatment to include: pt education, HEP, therapeutic exercises, neuromuscular re-education/balance exercises, therapeutic activities, joint mobilizations, and modalities PRN, to work towards established goals. Pt may be seen by PTA to carry out plan of care.      Nic Weiss, PTA   07/06/2018

## 2018-07-20 ENCOUNTER — CLINICAL SUPPORT (OUTPATIENT)
Dept: REHABILITATION | Facility: HOSPITAL | Age: 63
End: 2018-07-20
Attending: PHYSICAL MEDICINE & REHABILITATION
Payer: MEDICARE

## 2018-07-20 DIAGNOSIS — Z78.9 IMPAIRED MOTOR CONTROL: ICD-10-CM

## 2018-07-20 DIAGNOSIS — M25.612 DECREASED RANGE OF MOTION OF LEFT SHOULDER: ICD-10-CM

## 2018-07-20 DIAGNOSIS — M62.81 MUSCLE WEAKNESS OF LEFT UPPER EXTREMITY: ICD-10-CM

## 2018-07-20 DIAGNOSIS — Z74.09 IMPAIRED FUNCTIONAL MOBILITY, BALANCE, GAIT, AND ENDURANCE: ICD-10-CM

## 2018-07-20 DIAGNOSIS — Z78.9 IMPAIRED ACTIVITIES OF DAILY LIVING: ICD-10-CM

## 2018-07-20 PROCEDURE — G8988 SELF CARE GOAL STATUS: HCPCS | Mod: CJ,PO | Performed by: OPTOMETRIST

## 2018-07-20 PROCEDURE — 97530 THERAPEUTIC ACTIVITIES: CPT | Mod: PO | Performed by: OPTOMETRIST

## 2018-07-20 PROCEDURE — 97166 OT EVAL MOD COMPLEX 45 MIN: CPT | Mod: PO | Performed by: OPTOMETRIST

## 2018-07-20 PROCEDURE — G8987 SELF CARE CURRENT STATUS: HCPCS | Mod: CJ,PO | Performed by: OPTOMETRIST

## 2018-07-20 PROCEDURE — 97112 NEUROMUSCULAR REEDUCATION: CPT | Mod: PO

## 2018-07-20 NOTE — PROGRESS NOTES
Physical Therapy Progress Note      Name: Lacey Montana  Clinic Number: [id number]  Diagnosis:        Encounter Diagnoses   Name Primary?    Impaired functional mobility, balance, gait, and endurance      Impaired motor control        Physician: Juan Pablo Anaya MD  Treatment Orders: PT Evaluation and Treatment       Past Medical History:   Diagnosis Date    Anticoagulant long-term use      Encounter for blood transfusion      GERD (gastroesophageal reflux disease)      Seizures      Stroke           Precautions: universal  Visit #: 17  Date of Eval: 5/01/18  Plan of Care Expiration: 8/7/18     Functional Limitations Reports - G Codes  Category: mobility   Tool: tinetti, TUG, SSWS  Score: 24/28, 15.7 sec with LBQC, 0.64 m/s with LBQC     G codes 9/10    eval CJ-CI   6/1/18 CJ-CI   7/6/18 CJ-CI          Subjective   Pt reports: feeling better as her L hand is less swollen and is not a fatigued as she was during her last tx visit.    Pain Scale: no complaints     Objective     pt participated in neuromuscular re education x 35 minutes to address motor control and coordination to improve all mobility and balance:      EOM:     Restoration of arches of L hand  6 basic wrist stretches: 1. P-A glide of scaphoid on radius, 2. Radial deviation,               3. Increasing mobility of metacarpals, 4. Carpal roll, 5. Movement of forearm on    hand towards wrist extension, 6. Movement of forearm on the hand toward        supination/ pronation  Facilitation of support of LUE     Sitting L hip IR stretch with L lateral pelvic tilt  Scooting fwd and lateral on EOM  Manual internal hip rotation stretch in supine  HS curls with creeper with A to keep foot from excessive pronation      pregait without RUE support:   -Practicing weight shifting in // bars with Unilateral UE support with Mod A preventing LLE ER  -Practicing proper push off in terminal stance in // bars  "with Unilateal UE support with Mod A preventing LLE ER     -ambulation with shopping cart and L AFO  3 x ~40 ft with min A to prevent excessive external L hip rotation. Mod VC to correct LLE ER with initial contact  -reverse walking with shopping cart and L AFO 3 x ~40 ft with min A to prevent excessive external L hip rotation.        NP today:   DF stretch with wedge with restoration of arches + toe extension  + toe abd- midfoot pronation with forefoot abd noted   R step tap on 6" with min-mod A for weight shift, balance, min A for preventing L knee buckling  HS curls with pillow case. Min A to prevent ER     Supine:  3 x 30 sec of IR stretch  X 30 reps of bridges with 3 sec hold.  Min A to position LLE  Standing:  Weight shifting with Min A to correct LLE external rotation  Push off though LLE into hip and knee flexion with Min A  Machine:  Unilateral leg press LLE only 2 x 10 with 80#     Written Home Exercises:   Pt demo good understanding of the education provided including: bridges with ball squeeze, internal rotation hip stretch and PPT. Lacey demonstrated good return demonstration of activities.      Education provided re: POC, HEP  No spiritual or educational barriers to learning provided     Pt has no cultural, educational or language barriers to learning provided.     Assessment      Pt presented with decreased L hand inflammation upon arrival and decreased fatigue during today's tx session when compared to the previous tx visit. Pt continues to externally rotate L hip in terminal stance. Hence, pt performed pre-gait activities to for proper weight shifting and proper push off in terminal stance. Pt would benefit from addressing L DF, hip IR ROM, and pregait activities with emphasis on full weight shift with good knee control on RLE. Gait quality and mobility is limited by decreased strength, decreased motor control and abnormal muscle tone. Pt can benefit from continued skilled PT to address " impairments to improve quality of mobility to improve balance, decrease fall risk, and decrease possibility of secondary impairments (i.e. OA).       Anticipated barriers to progress:length of time since onset of impairments, seizures, recent medication changes, recent onset of dizziness     Impairment List:  Fall Risk - impaired balance   Weakness   Impaired motor control  Decreased ROM  Gait deviations   Decreased ambulation   Decreased activity tolerance   Difficulty participating in daily activities   Difficulty in participating in role as caregiver    Requires skilled supervision to complete and progress HEP   Requires skilled intervention and recommendations for AD/ orthotics     GOALS:   Status as of 7/6/18  Short term goals: 3-4 weeks, pt agrees to goals set.  1. Pt will perform HEP for basic strengthening and ROM with supervision to improve carryover of progress. Ongoing- pt reports difficulty with maintaining L knee flexion during supine exercises  2. Pt will demonstrate improved L DF to 4 degrees to improve foot clearance to decreased LLE ER during gait. Met 6/1/18-   3. Pt will demonstrate improved gait and mobility by demonstrating TUG with SBQC in 15 sec. Met 6/1/18- 14.6 sec with SBQC  4. Pt will demonstrate improved motor control and use of LLE for mobility by performing 5 times sit<>stand test in 10 sec with UE use as needed. Met 7/6/18- 9.6 sec with UE support  5. Pt will demonstrate improved mobility/ gait and decreased fall risk from high to moderate by scoring 19/28 on Tinetti Assessment. Met 6/1/18- 24/28  6. Make appropriate recommendations for DME as needed. Met 6/1/18- pt provided with list of DME vendors, has prescription for RW/ or quad cane     Long term goals: 6 weeks, pt agrees to goals set  7. Pt will demonstrate improved mobility and decreased fall risk by performing TUG with appropriate AD in 12 sec. ~same- 15.7 sec avg with LBQC and L AFO  8. Pt will demonstrate improved L DF to 10  degrees to demonstrate improved resting position/ mobility of foot/ ankle and improve foot clearance during gait. Same- 6 deg AAROM  9. Pt will demonstrate improved mobility/ gait by scoring 22/28 on Tinetti Assessment. Met 6/1/18- 24/28  10. Pt will ambulate with appropriate AD for 150 ft without significant ER of LLE to demonstrate improved gait quality and reduce abnormal motions which may cause a secondary impairment of LBP. Improving- pt demonstrates pt can correct ER initial contact through mid stance, ER cont to occur in terminal stance  11. Pt will demonstrate improved gait velocity to 1.14 m/s with appropraite AD to demonstrate a 25% improvement in gait. declined 0.67 m/s but improved quality noted     Plan   Continue PT 2x weekly under established Plan of Care, with treatment to include: pt education, HEP, therapeutic exercises, neuromuscular re-education/balance exercises, therapeutic activities, joint mobilizations, and modalities PRN, to work towards established goals. Pt may be seen by PTA to carry out plan of care.      Nic Weiss, PTA   07/06/2018

## 2018-07-20 NOTE — PLAN OF CARE
Occupational Therapy Initial Evaluation - Outpatient Neuro      Name: Lacey Montana  Clinic Number: [id number]    Therapy Diagnosis:   Encounter Diagnoses   Name Primary?    Muscle weakness of left upper extremity     Impaired activities of daily living     Decreased range of motion of left shoulder      Physician: Juan Pablo Anaya MD    Visit Date: 7/20/2018  Physician Orders: Evaluate and treat   Medical Diagnosis: History of complicated stroke  Surgical Procedure and Date: cranioplasty, 2005  Evaluation Date: 7/20/2018  Insurance Authorization Period Expiration: 12/31/2018  Plan of Care Certification Period: 8/31/2018  Visit # / Visits authorized: 1/ 20  Date of Return to MD: 8/20/2018    Time In:815  Time Out: 900  Total Billable Time: 45 minutes    Precautions: Standard    Onset date: 4/2003 R CVA    Subjective:  Patient goals: To gain any functional use of her Left hand and arm (bathing, dressing)  Chief Complaint: Unable to use her L arm for bathing and grooming   Chief Complaint   Patient presents with    OT Initial Evaluation      Past Medical History:   Diagnosis Date    Anticoagulant long-term use     Encounter for blood transfusion     GERD (gastroesophageal reflux disease)     Seizures     Stroke       Current Outpatient Prescriptions   Medication Sig    diclofenac sodium (VOLTAREN) 1 % Gel Apply 2 g topically 2 (two) times daily.    dipyridamole-aspirin 200-25 mg (AGGRENOX)  mg CM12 Take 1 capsule by mouth 2 (two) times daily.    EPINEPHrine (EPIPEN) 0.3 mg/0.3 mL AtIn Inject 0.3 mLs (0.3 mg total) into the muscle once.    FLUARIX QUAD 2501-9273, PF, 60 mcg (15 mcg x 4)/0.5 mL vaccine inject 0.5 milliliter intramuscularly    hydrochlorothiazide (HYDRODIURIL) 25 MG tablet Take 1 tablet (25 mg total) by mouth once daily.    levetiracetam (KEPPRA) 1000 MG tablet Take 1.5 tablets (1,500 mg total) by mouth 2 (two) times daily.    PNEUMOVAX 23 25 mcg/0.5 mL Syrg inject 0.5  milliliter intramuscularly    topiramate (TOPAMAX) 200 MG Tab Take 1 tablet (200 mg total) by mouth 2 (two) times daily.    topiramate (TOPAMAX) 200 MG Tab TAKE 1 TABLET BY ORAL ROUTE 2 TIMES PER DAY TAKE 1 TABLET BY MOUTH TWICE A DAY    triamcinolone acetonide 0.1% (KENALOG) 0.1 % cream Apply topically 2 (two) times daily.     No current facility-administered medications for this visit.        Social Hx: Pt lives alone but baby sits her grandchildren     DME: Shower chair and AFO    Home access: Pt lives in second floor apartment with elevator access. There are no steps insider her apartment     Review of patient's allergies indicates:   Allergen Reactions    Chocolate flavor     Codeine Rash    Percocet [oxycodone-acetaminophen] Rash     Past treatment includes: PT  OP    Precautions:  Pain: 0/10 at rest. 3/10 with movement. Pain established using the Numbers pain scale.   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  Job description includes: Takes care of her grandchildren for her daughter while her daughter works  Driving status: Never a      Objective  Cognitive Exam  Oriented: Person, Place, Time and Situation  Behaviors: Follows multistep  commands  Follows Commands/attention: Follows multistep  commands  Communication: clear/fluent  Memory: No Deficits noted  Safety awareness/insight to disability: Good as Pt   Coping skills/emotional control: Appropriate to situation    Visual/perceptual:  Tracking: intact  Saccades: intact  Acuity: Pt wears glasses to be WNL for vision  R/L discrimination: intact  Visual field: intact  Motor Planning Praxis: intact    Physical Exam:    Postural examination/scapula alignment: Rounded shoulder  Joint integrity: Subluxation of L humerus but mild  Skin  integrity: Intact  Edema: Mild in L hand and forearm     Sensation: Lacey reports full sensation in L UE     Joint Evaluation AROM  PROM    Left Right Left   Shoulder flex 0-180 60 WFL throughout 140   Shoulder Abd 0-180 80  130   Shoulder ER 0-90 0  0   Shoulder IR 0-90 90  90   Shoulder Extension 0-80 20  80   Elbow flex/ext 0-150 0-90  0-140   Wrist flex 0-80 10  70   Wrist ext 0-70 0  70   Supination 0-80 1/2 range  WFL   Pronation 0-80 WFL  WFL       Strength   Right UE WFL    Left   Shoulder flex P+   Shoulder abd P+   Shoulder ER T   Shoulder IR F-   Shoulder Extension P+   Elbow flex P+   Elbow ext P+   Wrist flex P+   Wrist ext P   Supination P-   Pronation F-        Strength: Unable to register on dynamometer    Fine motor coordination: Total impairment on L     Gross motor coordination: Significant impairment on L     Tone:  Modified Kaylen Scale:   1-  Slight increase in muscle tone, manifested by a catch and release or by minimal resistance at the end of the range of motion when the affected part(s) is moved in flexion or extension    Balance:   Static Sit - Good   Dynamic sit - Good   Static Stand - Fair + with AFO donned to L LE and with device  Dynamic stand - Fair + with device     Pt ambulates with a one handed rolling basket which she uses to carry her purse and belongings.       Functional Mobility:  Bed mobility: Mod I  Roll to left: Mod I  Roll to right: Mod I  Supine to sit: Mod I  Sit to supine: Mod I  Transfers to bed: Mod I  Transfers to toilet: Mod I  Car transfers: Mod I  Wheelchair mobility: N/A    ADL's:  Pt performs all tasks with only her R UE   Feeding: Mod I    Grooming: Mod I  Hygiene: Mod I  UB Dressing: Mod I  LB Dressing: Min A for donning and tying shoes   Toileting: Mod I  Bathing: Mod I    IADL's:  Homecare: Mod I  Cooking: Mod I  Laundry: Mod I  Yard work: N/A   Use of telephone: Mod I  Money management: I  Medication management: I  Comments:  Pt performs all tasks  with her R hand     TODAY'S TREATMENT    1. Lacey performed and was provided with a written copy of exercises to perform self ROM to her L shoulder and L wrist as she was issued non-slip material to help anchor her hand for ROM on counter top. Exercises were reviewed and Lacey was able to demonstrate them prior to the end of the session.   2. Pt was provided educational information, including plan of care possible use of a splint to manage her L hand and wrist     Treatment today also included: Ther act for AAROM and for self ROM to her shoulder and wrist for extension.     CMS Impairment/Limitation/Restriction for FOTO Cerebrovascular Disorders Survey  Status Limitation G-Code CMS Severity Modifier  Intake 63% 37% Current Status CJ - At least 20 percent but less than 40 percent  Predicted 64% 36% Goal Status+ CJ - At least 20 percent but less than 40 percent  D/C Status CJ **only report if this is a one time visit  +Based on FOTO predicted change score        ASSESSMENT:  OT diagnosis: L hemiparesis, Decreased fine motor coordination and L arm stiffness    Assessment  Lacey Montana is a 62 y.o. female with a medical diagnosis of   Encounter Diagnoses   Name Primary?    Muscle weakness of left upper extremity     Impaired activities of daily living     Decreased range of motion of left shoulder     referred to occupational therapy for evaluate and treat. She presents with decreased strength and ROM in her L UE that leave her arm non-functional and hinders her to perform self care tasks.    Lacey can benefit from outpatient Occupational therapy and a home program to address the stated goals to improve impairments and functional limitations. Treatment will be directed at improve the following impairments. Rehab potential is good.    PROBLEM LIST/IMPAIRMENTS:   decreased flexibility, decreased range of motion, decreased muscle strength, impaired function and decreased work ability    LTG GOALS:  Time  frame: 6 weeks  Increase ROM/Strength to perform ADL's and functional activities (specify)  Pt will increase L shoulder flexion AROM to 100* as needed to improve dressing tasks incorporating L UE   Pt will increase L elbow flexion AROM to 120* as needed to improve dressing task incorporating L UE   LB dressing will improve to Mod I    STG Goals:  Time frame: 3 weeks  Increase ROM/Strength to perform ADL's and functional activities (specify)  Pt will increase L shoulder flexion AROM to 90* as needed to improve dressing tasks incorporating L UE   Pt will increase L elbow flexion AROM to 100* as needed to improve dressing task incorporating L UE   Pt will improve  in L hand to register on Dynamometer in order to improve funcitonal use of L hand   Pt will be independent with tying shoes using one-hand technique.   Pt will be independent with HEP to improve ROM to L UE.     PLAN:    Patient/caregiver understands and agrees with plan of care.    Outpatient occupational therapy 2  times weekly for 6 weeks  to include: pt ed, hep, therapeutic exercises, therapeutic activity, ADLs,  neuromuscular re-education, joint mobilizations, modalities prn,        Treatment Time: 45 min     IRIS Lopez  7/20/2018      Profile and History Assessment of Occupational Performance Level of Clinical Decision Making Complexity Score   Occupational Profile:   Lacey Montana is a 62 y.o. female who lives alone and is currently on disability. Lacey Montana has difficulty with  dressing  housework/household chores  affecting his/her daily functional abilities. His/her main goal for therapy is gain any functional use of her L UE.     Co morbidities:   history of CVA    Medical and Therapy History Review:   Expanded               Performance Deficits    Physical:  Joint Mobility  Muscle Power/Strength  Control of Voluntary Movement  Gross Motor Coordination  Fine Motor Coordination    Cognitive:  No Deficits    Psychosocial:     Social Interaction     Clinical Decision Making:  moderate    Assessment Process:  Problem-Focused Assessments    Modification/Need for Assistance:  Minimal-Moderate Modifications/Assistance    Intervention Selection:  Several Treatment Options       moderate  Based on PMHX, co morbidities , data from assessments and functional level of assistance required with task and clinical presentation directly impacting function.                 I certify the need for these services furnished under this plan of treatment and while under my care.    Juan Pablo Anaya MD    Physician/Referring Practitioner     07/22/2018  Date of Signature

## 2018-07-24 ENCOUNTER — CLINICAL SUPPORT (OUTPATIENT)
Dept: REHABILITATION | Facility: HOSPITAL | Age: 63
End: 2018-07-24
Attending: PHYSICAL MEDICINE & REHABILITATION
Payer: MEDICARE

## 2018-07-24 DIAGNOSIS — Z74.09 IMPAIRED FUNCTIONAL MOBILITY, BALANCE, GAIT, AND ENDURANCE: ICD-10-CM

## 2018-07-24 DIAGNOSIS — Z78.9 IMPAIRED MOTOR CONTROL: ICD-10-CM

## 2018-07-24 PROCEDURE — 97116 GAIT TRAINING THERAPY: CPT | Mod: KX,PO

## 2018-07-24 PROCEDURE — 97112 NEUROMUSCULAR REEDUCATION: CPT | Mod: KX,PO

## 2018-07-24 NOTE — PROGRESS NOTES
"                                                      Physical Therapy Progress Note      Name: Lacey Montana  Clinic Number: [id number]  Diagnosis:        Encounter Diagnoses   Name Primary?    Impaired functional mobility, balance, gait, and endurance      Impaired motor control        Physician: Juan Pablo Anaya MD  Treatment Orders: PT Evaluation and Treatment       Past Medical History:   Diagnosis Date    Anticoagulant long-term use      Encounter for blood transfusion      GERD (gastroesophageal reflux disease)      Seizures      Stroke           Precautions: universal  Visit #: 18-KX  Date of Eval: 5/01/18  Plan of Care Expiration: 8/7/18     Functional Limitations Reports - G Codes  Category: mobility   Tool: tinetti, TUG, SSWS  Score: 24/28, 15.7 sec with LBQC, 0.64 m/s with LBQC     G codes 4/10    eval CJ-CI   6/1/18 CJ-CI   7/6/18 CJ-CI          Subjective   Pt reports: " I was itching my hand on the Left and now I have a blister.  We have to watch it when we use the basket."    Pain Scale: no complaints     Objective     pt participated in neuromuscular re education x 35 minutes to address motor control and coordination to improve all mobility and balance:      EOM:     Restoration of arches of L hand  6 basic wrist stretches: 1. P-A glide of scaphoid on radius, 2. Radial deviation,               3. Increasing mobility of metacarpals, 4. Carpal roll, 5. Movement of forearm on    hand towards wrist extension, 6. Movement of forearm on the hand toward        supination/ pronation  Facilitation of support of LUE     DF stretch with wedge with restoration of arches + toe extension  + toe abd- midfoot pronation with forefoot abd noted   BAPS board level 2, DF/PF, IR/ER with Min A to control movements.  HS curls with creeper with A to keep foot from excessive pronation x 30 reps     X 10 min pregait without RUE support:     -ambulation with shopping cart, L hand on docking station, and L AFO  2 x " 150 ft with min A to prevent excessive external L hip rotation. Mod VC to correct LLE ER with initial contact  -reverse walking with shopping cart, left hand on docking station and L AFO 2 x ~30 ft with min A to prevent excessive external L hip rotation.           Written Home Exercises:   Pt demo good understanding of the education provided including: bridges with ball squeeze, internal rotation hip stretch and PPT. Lacey demonstrated good return demonstration of activities.      Education provided re: POC, HEP  No spiritual or educational barriers to learning provided     Pt has no cultural, educational or language barriers to learning provided.     Assessment    Pt tolerated tx session fairly well and did not have any problems.   Pt cont with L hand restoration of arches and placed in jean gate splint.  Pts left hand was placed in a docking station while pushing the basket and showed improved weight bearing on the left UE during time of propelling the basket, cont to require VC's for correct alignment of the left leg and upright posturing.   Pt can benefit from continued skilled PT to address impairments to improve quality of mobility to improve balance, decrease fall risk, and decrease possibility of secondary impairments (i.e. OA).       Anticipated barriers to progress:length of time since onset of impairments, seizures, recent medication changes, recent onset of dizziness     Impairment List:  Fall Risk - impaired balance   Weakness   Impaired motor control  Decreased ROM  Gait deviations   Decreased ambulation   Decreased activity tolerance   Difficulty participating in daily activities   Difficulty in participating in role as caregiver    Requires skilled supervision to complete and progress HEP   Requires skilled intervention and recommendations for AD/ orthotics     GOALS:   Status as of 7/6/18  Short term goals: 3-4 weeks, pt agrees to goals set.  1. Pt will perform HEP for basic strengthening and  ROM with supervision to improve carryover of progress. Ongoing- pt reports difficulty with maintaining L knee flexion during supine exercises  2. Pt will demonstrate improved L DF to 4 degrees to improve foot clearance to decreased LLE ER during gait. Met 6/1/18-   3. Pt will demonstrate improved gait and mobility by demonstrating TUG with SBQC in 15 sec. Met 6/1/18- 14.6 sec with SBQC  4. Pt will demonstrate improved motor control and use of LLE for mobility by performing 5 times sit<>stand test in 10 sec with UE use as needed. Met 7/6/18- 9.6 sec with UE support  5. Pt will demonstrate improved mobility/ gait and decreased fall risk from high to moderate by scoring 19/28 on Tinetti Assessment. Met 6/1/18- 24/28  6. Make appropriate recommendations for DME as needed. Met 6/1/18- pt provided with list of DME vendors, has prescription for RW/ or quad cane     Long term goals: 6 weeks, pt agrees to goals set  7. Pt will demonstrate improved mobility and decreased fall risk by performing TUG with appropriate AD in 12 sec. ~same- 15.7 sec avg with LBQC and L AFO  8. Pt will demonstrate improved L DF to 10 degrees to demonstrate improved resting position/ mobility of foot/ ankle and improve foot clearance during gait. Same- 6 deg AAROM  9. Pt will demonstrate improved mobility/ gait by scoring 22/28 on Tinetti Assessment. Met 6/1/18- 24/28  10. Pt will ambulate with appropriate AD for 150 ft without significant ER of LLE to demonstrate improved gait quality and reduce abnormal motions which may cause a secondary impairment of LBP. Improving- pt demonstrates pt can correct ER initial contact through mid stance, ER cont to occur in terminal stance  11. Pt will demonstrate improved gait velocity to 1.14 m/s with appropraite AD to demonstrate a 25% improvement in gait. declined 0.67 m/s but improved quality noted     Plan   Continue PT 2x weekly under established Plan of Care, with treatment to include: pt education, HEP,  therapeutic exercises, neuromuscular re-education/balance exercises, therapeutic activities, joint mobilizations, and modalities PRN, to work towards established goals. Pt may be seen by PTA to carry out plan of care.      Isela Frederick, PTA   07/06/2018

## 2018-07-27 ENCOUNTER — CLINICAL SUPPORT (OUTPATIENT)
Dept: REHABILITATION | Facility: HOSPITAL | Age: 63
End: 2018-07-27
Attending: PHYSICAL MEDICINE & REHABILITATION
Payer: MEDICARE

## 2018-07-27 DIAGNOSIS — M62.81 MUSCLE WEAKNESS OF LEFT UPPER EXTREMITY: ICD-10-CM

## 2018-07-27 DIAGNOSIS — M25.612 DECREASED RANGE OF MOTION OF LEFT SHOULDER: Primary | ICD-10-CM

## 2018-07-27 DIAGNOSIS — Z78.9 IMPAIRED ACTIVITIES OF DAILY LIVING: ICD-10-CM

## 2018-07-27 PROCEDURE — 97530 THERAPEUTIC ACTIVITIES: CPT | Mod: PO | Performed by: OPTOMETRIST

## 2018-07-27 NOTE — PROGRESS NOTES
Occupational Therapy Daily Treatment Note     Name: Lacey Montana  Clinic Number: [id number]    Therapy Diagnosis:   Encounter Diagnoses   Name Primary?    Decreased range of motion of left shoulder Yes    Impaired activities of daily living     Muscle weakness of left upper extremity      Physician: Juan Pablo Anaya MD    Visit Date: 7/27/2018  Physician Orders: Evaluate and treat  Medical Diagnosis: CVA  Evaluation Date: 7/20/2018  Insurance Authorization Period Expiration: 12/31/2018  Plan of Care Certification Period: through 8/31/2018  Date of Return to MD: 8/20/2018    Visit # / Visits authorized: 2 / 20  Time In: 0825  Time Out: 0900  Total Billable Time: 35 minutes    Precautions:  Standard    Subjective     Pt reports: That her hand was not as tight after doing open hand WB taught in last session and   she was compliant with home exercise program given last session.   Response to previous treatment: Pt's hand was easier to open   Functional change: mildly decreased tone in L UE    Pain: 0/10 , but Pt noted tightness in her L UE during ROM to hand and shoulder  Location: in R UE     Objective     Lacey participated in dynamic functional therapeutic activities to improve functional performance for 35  minutes, including:  Beginning sitting EOM.  - Mobilization of palm of L hand for stretching and metacarpal flexion and extension    - General wrist stretches including 1. Scaphoid on radius 2. Increasing mobility of metacarpals 3. Carpal rolls 4. Increasing mobility towards radial deviation 5. Increasing mobility of wrist towards extension 6. Increasing mobility of wrist towards supination/pronation  - Open hand as assisted by therapist placed on hard surface on her L as she was asked to lean forward to promote wrist extension on her own.   - Application of [place] paddle splint to L hand.   -  Scapular mobilization for elevation, depression, adduction and protraction where AAROM was performed  to L side.  -  Assisted shoulder flexion continued from sitting at EOM combined with reaching for targets in various planes  -  GG paddle splint removed and hand ROM continued with emphasis on digit and thumb extension.   - Pt then brought to standing at counter for review of WB to open hand in standing with non-slip material underneath to apply WB and digit extension.     Home Exercises and Education Provided     Education provided:   - Review of standing WB into open L hand on counter  - Progress towards goals - Goals remain appropriate      Written Home Exercises Provided: yes.  Exercises were reviewed and Lacey was able to demonstrate them prior to the end of the session.  Lacey demonstrated good  understanding of the education provided.          Assessment     Pt did not report pain this day but had many areas of tightness in her L UE from her hand to shoulder that she stated were mildly uncomfortable at first but with long stretch tightness would lessen and she felt that she was capable of more movement after session. She is highly motivated to participate and improve and apologized that she was late due to having to take a cab to therapy sessions. She is determined not to miss a session.     Lacey is progressing well towards her goals and there are no updates to goals at this time. Pt prognosis is Good.     Pt will continue to benefit from skilled outpatient occupational therapy to address the deficits listed in the problem list on initial evaluation provide pt/family education and to maximize pt's level of independence in the home and community environment.     Anticipated barriers to occupational therapy: None at this time    Pt's spiritual, cultural and educational needs considered and pt agreeable to plan of care and goals.    Goals:    LTG GOALS:  Time frame: 6 weeks  Increase ROM/Strength to perform AL's and functional activities (specify)  Pt will increase L shoulder flexion AAROM to 100*  as needed to improve dressing tasks incorporating L UE   Pt will increase L elbow flexion AAROM to 120* as needed to improve dressing task incorporating L UE   LB dressing will improve to Mod I     STG Goals:  Time frame: 3 weeks  Increase ROM/Strength to perform ADL's and functional activities (specify)  Pt will increase L shoulder flexion AROM to 90* as needed to improve dressing tasks incorporating L UE   Pt will increase L elbow flexion AROM to 100* as needed to improve dressing task incorporating L UE   Pt will improve  in L hand to register on [employer] in order to improve functional use of L hand   Pt will be independent with tying shoes using one-hand technique.   Pt will be independent with HEP to improve ROM to L UE.     Plan   Patient/caregiver understands and agrees with plan of care.  Outpatient occupational therapy 2  times weekly for 6 weeks  to include: pt ed, hep, therapeutic exercises, therapeutic activity, ADLs,  neuromuscular re-education, joint mobilizations, modalities prn,      Discussed Plan of Care with patient: Yes  Updates/Grading for next session: Possible increase in ROM to shoulder and hand.       IRIS Lopez   7/27/2018

## 2018-07-30 ENCOUNTER — CLINICAL SUPPORT (OUTPATIENT)
Dept: REHABILITATION | Facility: HOSPITAL | Age: 63
End: 2018-07-30
Attending: PHYSICAL MEDICINE & REHABILITATION
Payer: MEDICARE

## 2018-07-30 DIAGNOSIS — M62.81 MUSCLE WEAKNESS OF LEFT UPPER EXTREMITY: ICD-10-CM

## 2018-07-30 DIAGNOSIS — Z78.9 IMPAIRED ACTIVITIES OF DAILY LIVING: ICD-10-CM

## 2018-07-30 DIAGNOSIS — M25.612 DECREASED RANGE OF MOTION OF LEFT SHOULDER: Primary | ICD-10-CM

## 2018-07-30 PROCEDURE — 97530 THERAPEUTIC ACTIVITIES: CPT | Mod: PO | Performed by: OPTOMETRIST

## 2018-07-30 NOTE — PROGRESS NOTES
Occupational Therapy Daily Treatment Note     Name: Lacey Montana  Clinic Number: [id number]    Therapy Diagnosis:   Encounter Diagnoses   Name Primary?    Decreased range of motion of left shoulder Yes    Impaired activities of daily living     Muscle weakness of left upper extremity      Physician: Juan Pablo Anaya MD    Visit Date: 7/30/2018  Physician Orders: Evaluate and treat  Medical Diagnosis: CVA  Evaluation Date: 7/20/2018  Insurance Authorization Period Expiration: 12/31/2018  Plan of Care Certification Period: through 8/31/2018  Date of Return to MD: 8/20/2018    Visit # / Visits authorized: 3 / 20  Time In: 0815  Time Out: 0900  Total Billable Time: 45 minutes    Precautions:  Standard    Subjective     Pt reports: That she slept well after last session as she worked very hard and was fatigued after.   Response to previous treatment: Increased shoulder PROM with decreased tightness   Functional change: mildly decreased tone in L UE    Pain: 0/10 , but Pt noted tightness in her L UE during ROM to hand and shoulder  Location: in R UE     Objective     Lacey participated in dynamic functional therapeutic activities to improve functional performance for 45  minutes, including:  Pt ambulated to session with her rolling basket and was seated EOM.  - Mobilization of palm of L hand for stretching and metacarpal flexion and extension    - General wrist stretches including 1. Scaphoid on radius 2. Increasing mobility of metacarpals 3. Carpal rolls 4. Increasing mobility towards radial deviation 5. Increasing mobility of wrist towards extension 6. Increasing mobility of wrist towards supination/pronation  - Open hand as assisted by therapist, then placed on hard surface on her L as she was asked to lean forward to promote wrist extension on her own.   - Application of [place] paddle splint to L hand.   - Scapular mobilization for elevation, depression, adduction and protraction where AAROM was  performed to L side.  - Assisted shoulder flexion continued from sitting at EOM combined with reaching for targets in various planes  - Pt moved to supine for assisted shoulder flexion with assist at scapula and then assist for movements at shoulder to reach targets as directed by therapist.   - Pt returned to sitting at EOM and was assisted with ER to L UE combined with WB into B UEs.     -  GG paddle splint removed and hand ROM continued with emphasis on digit and thumb extension.   -  Review of WB to open hand in standing with non-slip material underneath to apply WB and digit extension.     Home Exercises and Education Provided     Education provided:   - Review of standing WB into open L hand on counter and emphasis on limiting grasping objects or holding on with her L hand.   - Progress towards goals - Goals remain appropriate      Written Home Exercises Provided: yes.  Exercises were reviewed and Lacey was able to demonstrate them prior to the end of the session.  Lacey demonstrated good  understanding of the education provided.          Assessment     Pt stated that after last session she was not sore or in pain but did stated that she felt that she had a workout and that her ROM in her L UE felt greater. She is diligent at attending sessions and takes a cab to and from therapy. She is highly motivated to participate and improve and stated that she performs her HEP.  She is determined not to miss a session.     Lacey is progressing well towards her goals and there are no updates to goals at this time. Pt prognosis is Good.     Pt will continue to benefit from skilled outpatient occupational therapy to address the deficits listed in the problem list on initial evaluation provide pt/family education and to maximize pt's level of independence in the home and community environment.     Anticipated barriers to occupational therapy: None at this time    Pt's spiritual, cultural and educational needs  considered and pt agreeable to plan of care and goals.    Goals:    LTG GOALS:  Time frame: 6 weeks  Increase ROM/Strength to perform AL's and functional activities (specify)  Pt will increase L shoulder flexion AAROM to 100* as needed to improve dressing tasks incorporating L UE   Pt will increase L elbow flexion AAROM to 120* as needed to improve dressing task incorporating L UE   LB dressing will improve to Mod I     STG Goals:  Time frame: 3 weeks  Increase ROM/Strength to perform ADL's and functional activities (specify)  Pt will increase L shoulder flexion AROM to 90* as needed to improve dressing tasks incorporating L UE   Pt will increase L elbow flexion AROM to 100* as needed to improve dressing task incorporating L UE   Pt will improve  in L hand to register on [employer] in order to improve functional use of L hand   Pt will be independent with tying shoes using one-hand technique.   Pt will be independent with HEP to improve ROM to L UE.     Plan   Patient/caregiver understands and agrees with plan of care.  Outpatient occupational therapy 2  times weekly for 6 weeks  to include: pt ed, hep, therapeutic exercises, therapeutic activity, ADLs,  neuromuscular re-education, joint mobilizations, modalities prn,      Discussed Plan of Care with patient: Yes  Updates/Grading for next session: Continue increase in ROM to shoulder and hand and issue new HEPs.       IRIS Lopez   7/30/2018

## 2018-07-31 ENCOUNTER — DOCUMENTATION ONLY (OUTPATIENT)
Dept: REHABILITATION | Facility: HOSPITAL | Age: 63
End: 2018-07-31

## 2018-07-31 NOTE — PROGRESS NOTES
"Pt called to cancel PT session stating she "can't make appointment today".       Monica Mosley, DPT  7/31/2018    "

## 2018-08-03 ENCOUNTER — CLINICAL SUPPORT (OUTPATIENT)
Dept: REHABILITATION | Facility: HOSPITAL | Age: 63
End: 2018-08-03
Attending: PHYSICAL MEDICINE & REHABILITATION
Payer: MEDICARE

## 2018-08-03 DIAGNOSIS — M62.81 MUSCLE WEAKNESS OF LEFT UPPER EXTREMITY: ICD-10-CM

## 2018-08-03 DIAGNOSIS — Z78.9 IMPAIRED ACTIVITIES OF DAILY LIVING: Primary | ICD-10-CM

## 2018-08-03 DIAGNOSIS — M25.612 DECREASED RANGE OF MOTION OF LEFT SHOULDER: ICD-10-CM

## 2018-08-03 DIAGNOSIS — Z74.09 IMPAIRED FUNCTIONAL MOBILITY, BALANCE, GAIT, AND ENDURANCE: ICD-10-CM

## 2018-08-03 DIAGNOSIS — Z78.9 IMPAIRED MOTOR CONTROL: ICD-10-CM

## 2018-08-03 PROCEDURE — 97112 NEUROMUSCULAR REEDUCATION: CPT | Mod: KX,PO | Performed by: PHYSICAL THERAPIST

## 2018-08-03 PROCEDURE — G8978 MOBILITY CURRENT STATUS: HCPCS | Mod: CJ,PO | Performed by: PHYSICAL THERAPIST

## 2018-08-03 PROCEDURE — 97530 THERAPEUTIC ACTIVITIES: CPT | Mod: PO | Performed by: OPTOMETRIST

## 2018-08-03 PROCEDURE — G8979 MOBILITY GOAL STATUS: HCPCS | Mod: CI,PO | Performed by: PHYSICAL THERAPIST

## 2018-08-03 NOTE — PROGRESS NOTES
Occupational Therapy Daily Treatment Note     Name: Lacey Montana  Clinic Number: [id number]    Therapy Diagnosis:   Encounter Diagnoses   Name Primary?    Decreased range of motion of left shoulder     Impaired activities of daily living Yes    Muscle weakness of left upper extremity      Physician: Juan Pablo Anaya MD    Visit Date: 8/3/2018  Physician Orders: Evaluate and treat  Medical Diagnosis: CVA  Evaluation Date: 7/20/2018  Insurance Authorization Period Expiration: 12/31/2018  Plan of Care Certification Period: through 8/31/2018  Date of Return to MD: 8/20/2018    Visit # / Visits authorized: 4 / 20  Time In: 0815  Time Out: 0900  Total Billable Time: 45 minutes    Precautions:  Standard    Subjective     Pt reports: Her L shoulder was easier to move at end of session.   Response to previous treatment: Increased shoulder PROM with decreased tightness, able to move L arm easier   Functional change: mildly decreased tone in L UE    Pain: 6/10 , at L shoulder at beginning of manipulations  Location: in L UE     Objective     Lacey participated in dynamic functional therapeutic activities to improve functional performance for 45  minutes, including:  Pt was seated at EOM after PT session and was wearing paddle splint taped to her hand with wrist support.Session included:  - Scapular mobilization for elevation, depression, adduction and protraction where AAROM was performed to L side.  - Assisted shoulder flexion from sitting at EOM combined with reaching for targets in various planes  - Pt sitting at EOM and was assisted with ER to L UE combined with WB into B UEs and scapular retraction  - Pt's shoes re-laced and she was instructed in one-handed shoe tying    -  GG paddle splint removed and hand ROM continued with emphasis on digit and thumb extension.  - Mobilization of palm of L hand for stretching and metacarpal flexion and extension    - General wrist stretches including 1. Scaphoid on  radius 2. Increasing mobility of metacarpals 3. Carpal rolls 4. Increasing mobility towards radial deviation 5. Increasing mobility of wrist towards extension 6. Increasing mobility of wrist towards supination/pronation  - AAROM of shoulder functional movements for donning her purse over her head and shoulder movements for placement of L UE to manage her purse.  -  Review of WB to open hand in standing with non-slip material underneath to apply WB and digit extension.   - Discussion of options of resting hand splint to manage her L hand at home and to prevent tightness in hand that is relieved in therapy sessions.     Home Exercises and Education Provided     Education provided:   - Review of standing WB into open L hand on counter and emphasis on limiting grasping objects or holding on with her L hand.   - Progress towards goals - Goals remain appropriate      Written Home Exercises Provided: yes.  Exercises were reviewed and Lacey was able to demonstrate them prior to the end of the session.  Lacey demonstrated good  understanding of the education provided.          Assessment     Ms Montana is excited about gains in movement in her L shoulder and although she had pain a beginning of session, she had no pain with movement at end of session. She is a quick learner and was able to understand one-hand shoe tying in one trial. Pt is diligent at attending sessions and takes a cab to and from therapy. She is highly motivated to participate and improve and stated that she performs her HEP.  She is determined not to miss a session.     Lacey is progressing well towards her goals and there are no updates to goals at this time. Pt prognosis is Good.     Pt will continue to benefit from skilled outpatient occupational therapy to address the deficits listed in the problem list on initial evaluation provide pt/family education and to maximize pt's level of independence in the home and community environment.      Anticipated barriers to occupational therapy: None at this time    Pt's spiritual, cultural and educational needs considered and pt agreeable to plan of care and goals.    Goals:    LTG GOALS:  Time frame: 6 weeks  Increase ROM/Strength to perform AL's and functional activities (specify)  Pt will increase L shoulder flexion AAROM to 100* as needed to improve dressing tasks incorporating L UE   Pt will increase L elbow flexion AAROM to 120* as needed to improve dressing task incorporating L UE   LB dressing will improve to Mod I     STG Goals:  Time frame: 3 weeks  Increase ROM/Strength to perform ADL's and functional activities (specify)  Pt will increase L shoulder flexion AROM to 90* as needed to improve dressing tasks incorporating L UE   Pt will increase L elbow flexion AROM to 100* as needed to improve dressing task incorporating L UE   Pt will improve  in L hand to register on [employer] in order to improve functional use of L hand   Pt will be independent with tying shoes using one-hand technique.   Pt will be independent with HEP to improve ROM to L UE.     Plan   Patient/caregiver understands and agrees with plan of care.  Outpatient occupational therapy 2  times weekly for 6 weeks  to include: pt ed, hep, therapeutic exercises, therapeutic activity, ADLs,  neuromuscular re-education, joint mobilizations, modalities prn,      Discussed Plan of Care with patient: Yes  Updates/Grading for next session: Continue increase in ROM to shoulder and hand and issue new HEPs.       IRIS Lopez   8/3/2018

## 2018-08-04 NOTE — PLAN OF CARE
Physical Therapy Progress Note      Name: Lacey Montana  [facility] Number: [id number]   Medical Diagnosis:   Z86.73 (ICD-10-CM) - History of completed stroke   G81.94 (ICD-10-CM) - Left hemiparesis   M21.372 (ICD-10-CM) - Left foot drop   M54.41,G89.29 (ICD-10-CM) - Chronic midline low back pain with right-sided sciatica   R26.9 (ICD-10-CM) - Gait disorder          Encounter Diagnoses   Name Primary?    Impaired functional mobility, balance, gait, and endurance      Impaired motor control        Physician: Juan Pablo Anaya MD  Treatment Orders: PT Evaluation and Treatment       Past Medical History:   Diagnosis Date    Anticoagulant long-term use      Encounter for blood transfusion      GERD (gastroesophageal reflux disease)      Seizures      Stroke           Precautions: universal  Visit #: [id number]  Date of Eval: 5/01/18  Plan of Care Expiration: 10/2/18     Functional Limitations Reports - G Codes  Category: mobility   Tool: tinetti, TUG, SSWS  Score: 24/28, 13.8 sec with LBQC, 0.94 m/s with LBQC     G codes 5/10    eval CJ-CI   6/1/18 CJ-CI   7/6/18 CJ-CI   8/3/18 CJ-CI          Subjective   Pt reports: no new complaints  Pain Scale: no complaints     Objective   Pt >10 min late to session, unable to accomodate    pt participated in neuromuscular re education x 36 minutes to address motor control and coordination to improve all mobility and balance:      PROM L DF= 12 deg    TUG with L AFO and LBQC= 13.8 sec  SSWS with LBQC= 6m in 6.4 sec= 0.94 m/s    Tinetti Balance Assessment  Balance:  1. Sitting Balance 1   Leans/ slides in chair= 0   Steady= 1  2. Rises from chair 2   Unable without help= 0   Able, uses arms= 1   Able without use of arms= 2  3. Attempts to rise 2   Unable without help= 0   Able, >1 attmept required-= 1   Able, 1 attempt= 2  4. Immediate standing balance (1st 5 seconds) 2   Unsteady (swaggers, moves feet, trunk sway)= 0   Steady  "but uses walker or other support= 1   Narrow base of support without walker or support= 2  5. Nudged 2   Begins to fall= 0   Staggers, grabs, catches self= 1   Steady= 2  6. Standing balance 1   Unsteady= 0   Steady but wide FELICIA or uses AD=1   Narrow FELICIA without AD=2  7. Eyes closed 1   Unsteady= 0   Steady= 1  8. Turning 360 degrees 2   Discontinuous steps= 0   Continuous steps= 1   Unsteady= 0   Steady= 1  9. Sitting down 2   Unsafe= 0   Uses arms or not in a smooth motion= 1   Safe, smooth motion= 2  Balance score= 15/16  Gait:  1. Initiation 1   hesitates or multiple attempts to start= 0   No hesitancy= 1  2. Step length 2   Step to= 0   One foot passes= 1   reciprocal pattern= 2  3. Step height 2    Neither foot clears floor= 0   One foot clears floor= 1   Both feet clear floor= 2  4. Step symmetry 1   Not symmetrical= 0   Appears symmetrical= 1  5. Step continuity 1   Not continuous= 0   Appears continuous= 1  6. Path 1   Marked deviation= 0   Mild/moderate deviation or uses A.D.= 1   Straight without A.D.= 2  7. Trunk 0   Marked sway or uses A.D.= 0   No sway, but flexes knees or back, spread arms out while walking= 1   No sway, no flexion, no use of UE, no use of A.D.= 2  8. Walking stance 1   Heels apart= 0   Heels almost touching while walking= 1  Gait score= 9/12  Total score= 24/28 , minimal fall risk    faciliation of LLE for support of body:   Intermittent use of LBQC  Forward stepping with RLE without UE support, CGA for repositioning of LLE  Step tap R up on 8" step, intermittent UE support, mod A for blocking L knee and weight shift  Step tap R up on 6" cone- mod A for L knee stability and weight shift  Step tap with R up on large cone- mod A for weight shift and L knee stability    EOM:  Restoration of arches of L hand  6 basic wrist stretches: 1. P-A glide of scaphoid on radius, 2. Radial deviation,               3. Increasing mobility of metacarpals, 4. Carpal roll, 5. Movement of forearm on    " hand towards wrist extension, 6. Movement of forearm on the hand toward        supination/ pronation  Paddle splint applied to L  90 deg wrist ext brace applied to L    NP today:   BAPS board level 2, DF/PF, IR/ER with Min A to control movements.  HS curls with creeper with A to keep foot from excessive pronation x 30 reps   -ambulation with shopping cart, L hand on docking station, and L AFO  2 x 150 ft with min A to prevent excessive external L hip rotation. Mod VC to correct LLE ER with initial contact  -reverse walking with shopping cart, left hand on docking station and L AFO 2 x ~30 ft with min A to prevent excessive external L hip rotation.         Written Home Exercises: practice single hip scooting and pelvic tilts  Pt demo good understanding of the education provided including: bridges with ball squeeze, internal rotation hip stretch and PPT. Lacey demonstrated good return demonstration of activities.      Education provided re: POC, HEP  No spiritual or educational barriers to learning provided     Pt has no cultural, educational or language barriers to learning provided.     Assessment   Assessment period: 7/13/18 to 8/3/18 Lacey tolerates tx sessions well. Pt is demonstrating good improvements in gait quality, gait speed, and L ankle ROM. Pt met goal for obtaining improved L DF ROM to improve gait quality. Pt demonstrates improved ability to control LLE ER throughout gait cycle with minimal verbal cues and visual feedback. LLE ER worsens with increased speed. Pt continues to demonstrate poor ability to support body with LLE. Pt is spending more time in R stance vs. L. Pt cont with L hand restoration of arches and use of paddle/ wrist extension splint to encourage improved functional use of LUE during PT sessions. Pt can benefit from continued skilled PT to address impairments to improve quality of mobility to improve balance, decrease fall risk, and decrease possibility of secondary impairments  (i.e. OA).  Pt demonstrates a 22% average (-11%) level of impairment for mobility, pt is anticipated to improve impairment level to 15% by d/c.       Anticipated barriers to progress:length of time since onset of impairments, seizures, recent medication changes, recent onset of dizziness     Impairment List:  Fall Risk - impaired balance   Weakness   Impaired motor control  Decreased ROM  Gait deviations   Decreased ambulation   Decreased activity tolerance   Difficulty participating in daily activities   Difficulty in participating in role as caregiver    Requires skilled supervision to complete and progress HEP   Requires skilled intervention and recommendations for AD/ orthotics     GOALS:   Status as of 8/3/18  Short term goals: 3-4 weeks, pt agrees to goals set.  1. Pt will perform HEP for basic strengthening and ROM with supervision to improve carryover of progress. Ongoing- pt reports difficulty with maintaining L knee flexion during supine exercises  2. Pt will demonstrate improved L DF to 4 degrees to improve foot clearance to decreased LLE ER during gait. Met 6/1/18-   3. Pt will demonstrate improved gait and mobility by demonstrating TUG with SBQC in 15 sec. Met 6/1/18- 14.6 sec with SBQC  4. Pt will demonstrate improved motor control and use of LLE for mobility by performing 5 times sit<>stand test in 10 sec with UE use as needed. Met 7/6/18- 9.6 sec with UE support  5. Pt will demonstrate improved mobility/ gait and decreased fall risk from high to moderate by scoring 19/28 on Tinetti Assessment. Met 6/1/18- 24/28  6. Make appropriate recommendations for DME as needed. Met 6/1/18- pt provided with list of DME vendors, has prescription for RW/ or quad cane     Long term goals: 6 weeks, pt agrees to goals set  7. Pt will demonstrate improved mobility and decreased fall risk by performing TUG with appropriate AD in 12 sec. improved- 13.8 sec avg with LBQC and L AFO  8. Pt will demonstrate improved L DF to  10 degrees to demonstrate improved resting position/ mobility of foot/ ankle and improve foot clearance during gait. Met 8/3/18- 12 deg AAROM  9. Pt will demonstrate improved mobility/ gait by scoring 22/28 on Tinetti Assessment. Met 6/1/18- 24/28  10. Pt will ambulate with appropriate AD for 150 ft without significant ER of LLE to demonstrate improved gait quality and reduce abnormal motions which may cause a secondary impairment of LBP. Improving- pt demonstrates improved control of LLE ER during gait, but cannot consistently maintain  11. Pt will demonstrate improved gait velocity to 1.14 m/s with appropraite AD to demonstrate a 25% improvement in gait. Improved- 0.94 m/s but improved quality noted     Plan   POC extended x 8 weeks to allow for continued progress towards goals to improve pt's mobility. Continue PT 2x weekly under established Plan of Care, with treatment to include: pt education, HEP, therapeutic exercises, neuromuscular re-education/balance exercises, therapeutic activities, joint mobilizations, and modalities PRN, to work towards established goals. Pt may be seen by PTA to carry out plan of care.      Monica Mosley, PT   07/06/2018

## 2018-08-06 ENCOUNTER — TELEPHONE (OUTPATIENT)
Dept: PHYSICAL MEDICINE AND REHAB | Facility: CLINIC | Age: 63
End: 2018-08-06

## 2018-08-06 ENCOUNTER — CLINICAL SUPPORT (OUTPATIENT)
Dept: REHABILITATION | Facility: HOSPITAL | Age: 63
End: 2018-08-06
Attending: PHYSICAL MEDICINE & REHABILITATION
Payer: MEDICARE

## 2018-08-06 DIAGNOSIS — Z78.9 IMPAIRED MOTOR CONTROL: ICD-10-CM

## 2018-08-06 DIAGNOSIS — G81.94 LEFT HEMIPARESIS: ICD-10-CM

## 2018-08-06 DIAGNOSIS — Z74.09 IMPAIRED FUNCTIONAL MOBILITY, BALANCE, GAIT, AND ENDURANCE: ICD-10-CM

## 2018-08-06 DIAGNOSIS — Z78.9 IMPAIRED ACTIVITIES OF DAILY LIVING: ICD-10-CM

## 2018-08-06 DIAGNOSIS — M25.612 DECREASED RANGE OF MOTION OF LEFT SHOULDER: Primary | ICD-10-CM

## 2018-08-06 DIAGNOSIS — Z86.73 HISTORY OF COMPLETED STROKE: Primary | ICD-10-CM

## 2018-08-06 DIAGNOSIS — M62.81 MUSCLE WEAKNESS OF LEFT UPPER EXTREMITY: ICD-10-CM

## 2018-08-06 PROCEDURE — 97110 THERAPEUTIC EXERCISES: CPT | Mod: KX,PO

## 2018-08-06 PROCEDURE — 97530 THERAPEUTIC ACTIVITIES: CPT | Mod: PO | Performed by: OPTOMETRIST

## 2018-08-06 PROCEDURE — 97116 GAIT TRAINING THERAPY: CPT | Mod: KX,PO

## 2018-08-06 PROCEDURE — 97112 NEUROMUSCULAR REEDUCATION: CPT | Mod: KX,PO

## 2018-08-06 NOTE — PROGRESS NOTES
Occupational Therapy Daily Treatment Note     Name: Lacey Montana   Clinic Number: [id number]    Therapy Diagnosis:   Encounter Diagnoses   Name Primary?    Decreased range of motion of left shoulder Yes    Impaired activities of daily living     Muscle weakness of left upper extremity      Physician: Juan Pablo Anaya MD    Visit Date: 8/6/2018  Physician Orders: Evaluate and treat  Medical Diagnosis: CVA  Evaluation Date: 7/20/2018  Insurance Authorization Period Expiration: 12/31/2018  Plan of Care Certification Period: through 8/31/2018  Date of Return to MD: 8/20/2018    Visit # / Visits authorized: 5 / 20  Time In: 0815  Time Out: 0900  Total Billable Time: 45 minutes    Precautions:  Standard    Subjective     Pt reports: That she had mid back pain this past weekend, but not sure if it was due to therapy on Friday   Response to previous treatment: Increased shoulder PROM with decreased tightness, able to move L arm easier   Functional change: mildly decreased tone in L UE    Pain: 5/10 , at L side of mid back   Location: in L side and back      Objective     Lacey participated in dynamic functional therapeutic activities to improve functional performance for 45  minutes, including:  Pt was seated at EOM after PT session and was wearing Coral paddle splint to her L hand with wrist support.Session included:  - Scapular mobilization for elevation, depression, adduction and protraction where AAROM was performed to L side.  - Assisted shoulder flexion from sitting at EOM combined with reaching for targets in various planes  - Pt sitting at EOM and was assisted with ER to L UE combined with WB into B UEs and scapular retraction  - Easy Active Shoulder device used for movements forward, back and circles with emphasis on elbow extension  - Standing tasks at counter for WB on to counter with hand and GG paddle splint combined with movements   Pt placed in sitting for :   -  GG paddle splint removed  and hand ROM continued with emphasis on digit and thumb extension.  - Mobilization of palm of L hand for stretching and metacarpal flexion and extension    - General wrist stretches including 1. Scaphoid on radius 2. Increasing mobility of metacarpals 3. Carpal rolls 4. Increasing mobility towards radial deviation 5. Increasing mobility of wrist towards extension 6. Increasing mobility of wrist towards supination/pronation  - AAROM of shoulder functional movements for donning her purse over her head and shoulder movements for placement of L UE to manage her purse.  - Discussion of options of resting hand splint to manage her L hand at home and to prevent tightness in hand that is relieved in therapy sessions.     Home Exercises and Education Provided     Education provided:   - Review of standing WB into open L hand on counter and emphasis on limiting grasping objects or holding on with her L hand.   - Progress towards goals - Goals remain appropriate      Written Home Exercises Provided: yes.  Exercises were reviewed and Lacey was able to demonstrate them prior to the end of the session.  Lacey demonstrated good  understanding of the education provided.          Assessment     Ms Montana stated pain over the weekend in her L side and back but below her scapula. She had no pain in this session and had no pain with movement at end of session. She is motivated to try new activities to help her to improve her L UE for any functional movement.  Pt is diligent at attending sessions and takes a cab to and from therapy. She is highly motivated to participate and improve and stated that she performs her HEP.  She is determined not to miss a session.     Lacey is progressing well towards her goals and there are no updates to goals at this time. Pt prognosis is Good.     Pt will continue to benefit from skilled outpatient occupational therapy to address the deficits listed in the problem list on initial evaluation  provide pt/family education and to maximize pt's level of independence in the home and community environment.     Anticipated barriers to occupational therapy: None at this time    Pt's spiritual, cultural and educational needs considered and pt agreeable to plan of care and goals.    Goals:    LTG GOALS:  Time frame: 6 weeks  Increase ROM/Strength to perform AL's and functional activities (specify)  Pt will increase L shoulder flexion AAROM to 100* as needed to improve dressing tasks incorporating L UE   Pt will increase L elbow flexion AAROM to 120* as needed to improve dressing task incorporating L UE   LB dressing will improve to Mod I     STG Goals:  Time frame: 3 weeks  Increase ROM/Strength to perform ADL's and functional activities (specify)  Pt will increase L shoulder flexion AROM to 90* as needed to improve dressing tasks incorporating L UE   Pt will increase L elbow flexion AROM to 100* as needed to improve dressing task incorporating L UE   Pt will improve  in L hand to register on [employer] in order to improve functional use of L hand   Pt will be independent with tying shoes using one-hand technique.   Pt will be independent with HEP to improve ROM to L UE.     Plan   Patient/caregiver understands and agrees with plan of care.  Outpatient occupational therapy 2  times weekly for 6 weeks  to include: pt ed, hep, therapeutic exercises, therapeutic activity, ADLs,  neuromuscular re-education, joint mobilizations, modalities prn,      Discussed Plan of Care with patient: Yes  Updates/Grading for next session: Continue increase in ROM to shoulder and hand and issue new HEPs.       IRIS Lopez   8/6/2018

## 2018-08-06 NOTE — TELEPHONE ENCOUNTER
----- Message from IRIS Lopez sent at 8/6/2018 12:45 PM CDT -----  Contact: IRIS Lopez  Just placed in Epic should do it as we will get the brace from [facility].   Thanks-     ----- Message -----  From: Juan Pablo Anaya MD  Sent: 8/6/2018  12:42 PM  To: IRIS Lopez    Hi,    Does the prescription need to be faxed or just placed in EPIC for you to be able to provide it?    Juan Pablo Anaya MD  Physical Medicine & Rehabilitation.    ----- Message -----  From: IRIS Lopez  Sent: 8/6/2018  11:02 AM  To: MD Dr Jaclyn Adams,  I am working with Ms Montana and feel that she can benefit from a resting hand splint for her L hand/wrist. The one she has is very old and worn out.  Would you please place an order for one for her ?   Thank you,  IRIS Lopez

## 2018-08-06 NOTE — PROGRESS NOTES
"                                                      Physical Therapy Progress Note      Name: Lacey Montana  Clinic Number: [id number]  Diagnosis:        Encounter Diagnoses   Name Primary?    Impaired functional mobility, balance, gait, and endurance      Impaired motor control        Physician: Juan Pablo Anaya MD  Treatment Orders: PT Evaluation and Treatment       Past Medical History:   Diagnosis Date    Anticoagulant long-term use      Encounter for blood transfusion      GERD (gastroesophageal reflux disease)      Seizures      Stroke           Precautions: universal  Precautions: universal  Visit #: 20-KX  Date of Eval: 5/01/18  Plan of Care Expiration: 10/2/18     Functional Limitations Reports - G Codes  Category: mobility   Tool: tinetti, TUG, SSWS  Score: 24/28, 13.8 sec with LBQC, 0.94 m/s with LBQC     G codes 1/10    eval CJ-CI   6/1/18 CJ-CI   7/6/18 CJ-CI   8/3/18 CJ-CI            Subjective   Pt reports: "Doing pretty good."  Pain Scale: no complaints     Objective   Therapeutic exercises to increase strength and endurance x 10 min including;  X 8 min on Sci Fit B UE/B LE.  Level 3.0.  L UE ace wrapped to handle    pt participated in neuromuscular re education x 15 minutes to address motor control and coordination to improve all mobility and balance:      EOM:     Restoration of arches of L hand  6 basic wrist stretches: 1. P-A glide of scaphoid on radius, 2. Radial deviation,               3. Increasing mobility of metacarpals, 4. Carpal roll, 5. Movement of forearm on    hand towards wrist extension, 6. Movement of forearm on the hand toward        supination/ pronation  Facilitation of support of LUE     DF stretch with wedge with restoration of arches + toe extension  + toe abd- midfoot pronation with forefoot abd noted   BAPS board level 2, DF/PF, IR/ER with Min A to control movements.       X 20 min pregait without RUE support:   2 x 10 reps of R LE target taps to 4 inch step " returning to midline after each to promote weight shifting with 1 UE support and CGA  2 x 10 reps of R LE target taps to 4 inch and 8 inch steps w/o turning to midline with 1 UE support and CGA  1 x 10 reps of LLE single leg step up onto 4 inch step with 1 UE support and CGA  X 4 laps of forward ambulation stepping over small discs working          Written Home Exercises:   Pt demo good understanding of the education provided including: bridges with ball squeeze, internal rotation hip stretch and PPT. Lacey demonstrated good return demonstration of activities.      Education provided re: POC, HEP  No spiritual or educational barriers to learning provided     Pt has no cultural, educational or language barriers to learning provided.     Assessment    Pt tolerated tx session well and did not have any problems.  Pt cont with stretching to L ankle and focused on weight shifting activities to promote weight bearing on the LLE.  PT required VC's to align LLE for weight shifting activities.    Pt can benefit from continued skilled PT to address impairments to improve quality of mobility to improve balance, decrease fall risk, and decrease possibility of secondary impairments (i.e. OA).       Anticipated barriers to progress:length of time since onset of impairments, seizures, recent medication changes, recent onset of dizziness     Impairment List:  Fall Risk - impaired balance   Weakness   Impaired motor control  Decreased ROM  Gait deviations   Decreased ambulation   Decreased activity tolerance   Difficulty participating in daily activities   Difficulty in participating in role as caregiver    Requires skilled supervision to complete and progress HEP   Requires skilled intervention and recommendations for AD/ orthotics     GOALS:   Status as of 7/6/18  Short term goals: 3-4 weeks, pt agrees to goals set.  1. Pt will perform HEP for basic strengthening and ROM with supervision to improve carryover of progress.  Ongoing- pt reports difficulty with maintaining L knee flexion during supine exercises  2. Pt will demonstrate improved L DF to 4 degrees to improve foot clearance to decreased LLE ER during gait. Met 6/1/18-   3. Pt will demonstrate improved gait and mobility by demonstrating TUG with SBQC in 15 sec. Met 6/1/18- 14.6 sec with SBQC  4. Pt will demonstrate improved motor control and use of LLE for mobility by performing 5 times sit<>stand test in 10 sec with UE use as needed. Met 7/6/18- 9.6 sec with UE support  5. Pt will demonstrate improved mobility/ gait and decreased fall risk from high to moderate by scoring 19/28 on Tinetti Assessment. Met 6/1/18- 24/28  6. Make appropriate recommendations for DME as needed. Met 6/1/18- pt provided with list of DME vendors, has prescription for RW/ or quad cane     Long term goals: 6 weeks, pt agrees to goals set  7. Pt will demonstrate improved mobility and decreased fall risk by performing TUG with appropriate AD in 12 sec. ~same- 15.7 sec avg with LBQC and L AFO  8. Pt will demonstrate improved L DF to 10 degrees to demonstrate improved resting position/ mobility of foot/ ankle and improve foot clearance during gait. Same- 6 deg AAROM  9. Pt will demonstrate improved mobility/ gait by scoring 22/28 on Tinetti Assessment. Met 6/1/18- 24/28  10. Pt will ambulate with appropriate AD for 150 ft without significant ER of LLE to demonstrate improved gait quality and reduce abnormal motions which may cause a secondary impairment of LBP. Improving- pt demonstrates pt can correct ER initial contact through mid stance, ER cont to occur in terminal stance  11. Pt will demonstrate improved gait velocity to 1.14 m/s with appropraite AD to demonstrate a 25% improvement in gait. declined 0.67 m/s but improved quality noted     Plan   Continue PT 2x weekly under established Plan of Care, with treatment to include: pt education, HEP, therapeutic exercises, neuromuscular re-education/balance  exercises, therapeutic activities, joint mobilizations, and modalities PRN, to work towards established goals. Pt may be seen by PTA to carry out plan of care.      Isela Frederick, PTA   07/06/2018

## 2018-08-09 NOTE — PROGRESS NOTES
Occupational Therapy Daily Treatment Note     Name: Lacey Montana   Clinic Number: [id number]    Therapy Diagnosis:   Encounter Diagnoses   Name Primary?    Impaired activities of daily living     Decreased range of motion of left shoulder     Muscle weakness of left upper extremity      Physician: Juan Pablo Anaya MD    Visit Date: 8/10/2018  Physician Orders: Evaluate and treat  Medical Diagnosis: CVA  Evaluation Date: 7/20/2018  Insurance Authorization Period Expiration: 12/31/2018  Plan of Care Certification Period: through 8/31/2018  Date of Return to MD: 8/20/2018    Visit # / Visits authorized: 6 / 20  Time In: 0730   Time Out: 0815   Total Billable Time: 45 minutes    Precautions:  Standard    Subjective     Pt reports: She had a seizure yesterday - did not report to hospital/ER for follow up care.  She reports she is feeling well today.   Response to previous treatment: Improved passive and active shoulder mobility   Functional change: progressing towards goals     Pain: none reported   Location: NA      Objective     Lacey participated in dynamic functional therapeutic activities to improve functional performance for 45 minutes, including:  EOM:   - Mobilization of palm of L hand for stretching and metacarpal flexion and extension    - General wrist stretches including 1. Scaphoid on radius 2. Increasing mobility of metacarpals 3. Carpal rolls 4. Increasing mobility towards radial deviation 5. Increasing mobility of wrist towards extension 6. Increasing mobility of wrist towards supination/pronation  - Open hand as assisted by therapist, then placed on hard surface on her L as she was asked to lean forward to promote wrist extension on her own.   - Application of [place] paddle splint to L hand.   - Scapular mobilization for elevation, depression, adduction and protraction where AAROM was performed to L side.  - assisted with ER to L UE combined with WB into B UEs and scapular retraction    - active assisted scap squeezes x 10 reps   - active assisted shoulder shrugs x 10 reps   - patient then transitioned to supine with SBA:     Supine:   - AAROM L bicep flexion and extension 3 ways x 6 reps each direction (palm up, palm down, and neutral)   - AAROM L shoulder flexion with prolonged stretch at end range   - AAROM L shoulder ER with prolonged stretch at end range   - AAROM PNF D1 and D2 flexion and extension patterns     EOM and in stand: (GG splint removed for Easy Active Shoulder)   - Easy Active Shoulder device used for movements forward and back and ER and horizontal add/abd with emphasis on elbow extension    Home Exercises and Education Provided     Education provided: POC, HEP   - Review of standing WB into open L hand on counter and emphasis on limiting grasping objects or holding on with her L hand.   - Progress towards goals - Goals remain appropriate      Written Home Exercises Provided: no new, continue current HEP.  Exercises were reviewed and Lacey was able to demonstrate them prior to the end of the session.  Lacey demonstrated good  understanding of the education provided.          Assessment   Ms Montana tolerated treatment session well.  She continues with decreased LUE active range of motion and mm strength and required assistance for all of the above ROM exercises - Patient performed to available active range and then OT provided assistance.  She demonstrates decreased mm tightness and improve flexibility as PROM appeared WNL this date.  She denied pain with the above exercises.  Patient would benefit from continued skilled OT intervention to improve functional use of her LUE.  Pt is diligent at attending sessions and takes a cab to and from therapy. She is highly motivated to participate and improve and stated that she performs her HEP.  She is determined not to miss a session.     Lacey is progressing well towards her goals and there are no updates to goals at this  time. Pt prognosis is Good.     Pt will continue to benefit from skilled outpatient occupational therapy to address the deficits listed in the problem list on initial evaluation provide pt/family education and to maximize pt's level of independence in the home and community environment.     Anticipated barriers to occupational therapy: None at this time    Pt's spiritual, cultural and educational needs considered and pt agreeable to plan of care and goals.    Goals:    LTG GOALS:  Time frame: 6 weeks  Increase ROM/Strength to perform AL's and functional activities (specify)  Pt will increase L shoulder flexion AAROM to 100* as needed to improve dressing tasks incorporating L UE   Pt will increase L elbow flexion AAROM to 120* as needed to improve dressing task incorporating L UE   LB dressing will improve to Mod I     STG Goals:  Time frame: 3 weeks  Increase ROM/Strength to perform ADL's and functional activities (specify)  Pt will increase L shoulder flexion AROM to 90* as needed to improve dressing tasks incorporating L UE   Pt will increase L elbow flexion AROM to 100* as needed to improve dressing task incorporating L UE   Pt will improve  in L hand to register on Dynamometer in order to improve functional use of L hand   Pt will be independent with tying shoes using one-hand technique.   Pt will be independent with HEP to improve ROM to L UE.     Plan   Patient/caregiver understands and agrees with plan of care.  Outpatient occupational therapy 2  times weekly for 6 weeks  to include: pt ed, hep, therapeutic exercises, therapeutic activity, ADLs,  neuromuscular re-education, joint mobilizations, modalities prn,      Discussed Plan of Care with patient: Yes  Updates/Grading for next session: Continue increase in ROM to shoulder and hand and issue new HEPs.       Cari Argueta OT   8/10/2018

## 2018-08-10 ENCOUNTER — CLINICAL SUPPORT (OUTPATIENT)
Dept: REHABILITATION | Facility: HOSPITAL | Age: 63
End: 2018-08-10
Attending: PHYSICAL MEDICINE & REHABILITATION
Payer: MEDICARE

## 2018-08-10 DIAGNOSIS — Z78.9 IMPAIRED MOTOR CONTROL: ICD-10-CM

## 2018-08-10 DIAGNOSIS — Z74.09 IMPAIRED FUNCTIONAL MOBILITY, BALANCE, GAIT, AND ENDURANCE: ICD-10-CM

## 2018-08-10 DIAGNOSIS — M25.612 DECREASED RANGE OF MOTION OF LEFT SHOULDER: ICD-10-CM

## 2018-08-10 DIAGNOSIS — M62.81 MUSCLE WEAKNESS OF LEFT UPPER EXTREMITY: ICD-10-CM

## 2018-08-10 DIAGNOSIS — Z78.9 IMPAIRED ACTIVITIES OF DAILY LIVING: ICD-10-CM

## 2018-08-10 PROCEDURE — 97116 GAIT TRAINING THERAPY: CPT | Mod: PO,59

## 2018-08-10 PROCEDURE — 97112 NEUROMUSCULAR REEDUCATION: CPT | Mod: PO

## 2018-08-10 PROCEDURE — 97110 THERAPEUTIC EXERCISES: CPT | Mod: PO

## 2018-08-10 PROCEDURE — 97530 THERAPEUTIC ACTIVITIES: CPT | Mod: PO

## 2018-08-10 NOTE — PROGRESS NOTES
Physical Therapy Progress Note      Name: Lacey Montana  [facility] Number: [id number]  Diagnosis:        Encounter Diagnoses   Name Primary?    Impaired functional mobility, balance, gait, and endurance      Impaired motor control        Physician: Juan Pablo Anaya MD  Treatment Orders: PT Evaluation and Treatment       Past Medical History:   Diagnosis Date    Anticoagulant long-term use      Encounter for blood transfusion      GERD (gastroesophageal reflux disease)      Seizures      Stroke           Precautions: universal  Precautions: universal  Visit #: 20-KX  Date of Eval: 5/01/18  Plan of Care Expiration: 10/2/18     Functional Limitations Reports - G Codes  Category: mobility   Tool: tinetti, TUG, SSWS  Score: 24/28, 13.8 sec with LBQC, 0.94 m/s with LBQC     G codes 2/10    eval CJ-CI   6/1/18 CJ-CI   7/6/18 CJ-CI   8/3/18 CJ-CI            Subjective   Pt reports: a little improvement in ambulation     Pain Scale: no complaints     Objective   Therapeutic exercises to increase strength and endurance x 10 min including;  X 8 min on Sci Fit B UE/B LE.  Level 3.0.  L UE ace wrapped to handle    pt participated in neuromuscular re education x 20 minutes to address motor control and coordination to improve all mobility and balance:      EOM:     Restoration of arches of L hand  6 basic wrist stretches: 1. P-A glide of scaphoid on radius, 2. Radial deviation,               3. Increasing mobility of metacarpals, 4. Carpal roll, 5. Movement of forearm on    hand towards wrist extension, 6. Movement of forearm on the hand toward        supination/ pronation  Facilitation of support of LUE     DF stretch with wedge with restoration of arches + toe extension  + toe abd- midfoot pronation with forefoot abd noted   BAPS board level 2, DF/PF, IR/ER with Min A to control movements np due to time       X 10 min pregait without RUE support:   2 x 10 reps of R  LE target taps to 4 inch step returning to midline after each to promote weight shifting with 1 UE support and CGA  2 x 10 reps of R LE target taps to 4 inch and 8 inch steps w/o turning to midline with 1 UE support and CGA  1 x 10 reps of LLE single leg step up onto 4 inch step with 1 UE support and CGA np due to time  X 4 laps of forward ambulation stepping over small discs working np due to time         Written Home Exercises:   Pt demo good understanding of the education provided including: bridges with ball squeeze, internal rotation hip stretch and PPT. Lacey demonstrated good return demonstration of activities.      Education provided re: POC, HEP  No spiritual or educational barriers to learning provided     Pt has no cultural, educational or language barriers to learning provided.     Assessment   Pt tolerated tx well this morning. Pt could benefit form continued activities that target quad activation when weight bearing on LLE in order to promote LLE stability during ambulation. Pt cont with stretching to L ankle and focused on weight shifting activities to promote weight bearing on the LLE. Pt can benefit from continued skilled PT to address impairments to improve quality of mobility to improve balance, decrease fall risk, and decrease possibility of secondary impairments (i.e. OA).       Anticipated barriers to progress:length of time since onset of impairments, seizures, recent medication changes, recent onset of dizziness     Impairment List:  Fall Risk - impaired balance   Weakness   Impaired motor control  Decreased ROM  Gait deviations   Decreased ambulation   Decreased activity tolerance   Difficulty participating in daily activities   Difficulty in participating in role as caregiver    Requires skilled supervision to complete and progress HEP   Requires skilled intervention and recommendations for AD/ orthotics     GOALS:   Status as of 7/6/18  Short term goals: 3-4 weeks, pt agrees to goals  set.  1. Pt will perform HEP for basic strengthening and ROM with supervision to improve carryover of progress. Ongoing- pt reports difficulty with maintaining L knee flexion during supine exercises  2. Pt will demonstrate improved L DF to 4 degrees to improve foot clearance to decreased LLE ER during gait. Met 6/1/18-   3. Pt will demonstrate improved gait and mobility by demonstrating TUG with SBQC in 15 sec. Met 6/1/18- 14.6 sec with SBQC  4. Pt will demonstrate improved motor control and use of LLE for mobility by performing 5 times sit<>stand test in 10 sec with UE use as needed. Met 7/6/18- 9.6 sec with UE support  5. Pt will demonstrate improved mobility/ gait and decreased fall risk from high to moderate by scoring 19/28 on Tinetti Assessment. Met 6/1/18- 24/28  6. Make appropriate recommendations for DME as needed. Met 6/1/18- pt provided with list of DME vendors, has prescription for RW/ or quad cane     Long term goals: 6 weeks, pt agrees to goals set  7. Pt will demonstrate improved mobility and decreased fall risk by performing TUG with appropriate AD in 12 sec. ~same- 15.7 sec avg with LBQC and L AFO  8. Pt will demonstrate improved L DF to 10 degrees to demonstrate improved resting position/ mobility of foot/ ankle and improve foot clearance during gait. Same- 6 deg AAROM  9. Pt will demonstrate improved mobility/ gait by scoring 22/28 on Tinetti Assessment. Met 6/1/18- 24/28  10. Pt will ambulate with appropriate AD for 150 ft without significant ER of LLE to demonstrate improved gait quality and reduce abnormal motions which may cause a secondary impairment of LBP. Improving- pt demonstrates pt can correct ER initial contact through mid stance, ER cont to occur in terminal stance  11. Pt will demonstrate improved gait velocity to 1.14 m/s with appropraite AD to demonstrate a 25% improvement in gait. declined 0.67 m/s but improved quality noted     Plan   Continue PT 2x weekly under established Plan  of Care, with treatment to include: pt education, HEP, therapeutic exercises, neuromuscular re-education/balance exercises, therapeutic activities, joint mobilizations, and modalities PRN, to work towards established goals. Pt may be seen by PTA to carry out plan of care.      Nic Weiss, PTA   07/06/2018

## 2018-08-15 ENCOUNTER — CLINICAL SUPPORT (OUTPATIENT)
Dept: REHABILITATION | Facility: HOSPITAL | Age: 63
End: 2018-08-15
Attending: PHYSICAL MEDICINE & REHABILITATION
Payer: MEDICARE

## 2018-08-15 ENCOUNTER — CLINICAL SUPPORT (OUTPATIENT)
Dept: REHABILITATION | Facility: HOSPITAL | Age: 63
End: 2018-08-15
Attending: FAMILY MEDICINE
Payer: MEDICARE

## 2018-08-15 DIAGNOSIS — Z78.9 IMPAIRED MOTOR CONTROL: ICD-10-CM

## 2018-08-15 DIAGNOSIS — M62.81 MUSCLE WEAKNESS OF LEFT UPPER EXTREMITY: ICD-10-CM

## 2018-08-15 DIAGNOSIS — M25.612 DECREASED RANGE OF MOTION OF LEFT SHOULDER: Primary | ICD-10-CM

## 2018-08-15 DIAGNOSIS — Z74.09 IMPAIRED FUNCTIONAL MOBILITY, BALANCE, GAIT, AND ENDURANCE: ICD-10-CM

## 2018-08-15 DIAGNOSIS — Z78.9 IMPAIRED ACTIVITIES OF DAILY LIVING: ICD-10-CM

## 2018-08-15 PROCEDURE — 97112 NEUROMUSCULAR REEDUCATION: CPT | Mod: KX,PO | Performed by: PHYSICAL THERAPIST

## 2018-08-15 PROCEDURE — 97530 THERAPEUTIC ACTIVITIES: CPT | Mod: PO | Performed by: OPTOMETRIST

## 2018-08-15 NOTE — PROGRESS NOTES
Physical Therapy Progress Note      Name: Lacey Montana  [facility] Number: [id number]  Diagnosis:        Encounter Diagnoses   Name Primary?    Impaired functional mobility, balance, gait, and endurance      Impaired motor control        Physician: Juan Pablo Anaya MD  Treatment Orders: PT Evaluation and Treatment       Past Medical History:   Diagnosis Date    Anticoagulant long-term use      Encounter for blood transfusion      GERD (gastroesophageal reflux disease)      Seizures      Stroke           Precautions: universal  Precautions: universal  Visit #: 21-KX  Date of Eval: 5/01/18  Plan of Care Expiration: 10/2/18     Functional Limitations Reports - G Codes  Category: mobility   Tool: tinetti, TUG, SSWS  Score: 24/28, 13.8 sec with LBQC, 0.94 m/s with LBQC     G codes 3/10    eval CJ-CI   6/1/18 CJ-CI   7/6/18 CJ-CI   8/3/18 CJ-CI            Subjective   Pt reports: no new complaints    Pain Scale: no complaints     Objective     pt participated in neuromuscular re education x 45 minutes to address motor control and coordination to improve all mobility and balance:      EOM:  Restoration of arches of L hand  6 basic wrist stretches: 1. P-A glide of scaphoid on radius, 2. Radial deviation,               3. Increasing mobility of metacarpals, 4. Carpal roll, 5. Movement of forearm on    hand towards wrist extension, 6. Movement of forearm on the hand toward        supination/ pronation  Paddle splint applied to L  90 deg wrist ext brace applied L    Facilitation of support of LUE    DF stretch with wedge with restoration of arches + toe extension  + toe abd- with forefoot abd noted   BAPS board level 3, DF with Min A to control movements (IV + toe flex + DF noted)    // bar: tandem gait front, RUE support- 3 laps    Backwards gait with RUE support, mod A for backwards (LLE placement)- 3 laps     NP today:   pregait without RUE support:   2 x 10  reps of R LE target taps to 4 inch step returning to midline after each to promote weight shifting with 1 UE support and CGA  2 x 10 reps of R LE target taps to 4 inch and 8 inch steps w/o turning to midline with 1 UE support and CGA  1 x 10 reps of LLE single leg step up onto 4 inch step with 1 UE support and CGA np due to time  X 4 laps of forward ambulation stepping over small discs working np due to time         Written Home Exercises:   Pt demo good understanding of the education provided including: bridges with ball squeeze, internal rotation hip stretch and PPT. Lacey demonstrated good return demonstration of activities.      Education provided re: POC, HEP  No spiritual or educational barriers to learning provided     Pt has no cultural, educational or language barriers to learning provided.     Assessment   Lacey tolerated treatment well. PT addressed LUE/ hand ROM and support of body on limb in sitting. tandem gait w/ UE support was practiced to address LLE ER during gait. Pt required assistance to decrease/ correct LLE ER during backwards gait. Pt can benefit from continued skilled PT to address impairments to improve quality of mobility to improve balance, decrease fall risk, and decrease possibility of secondary impairments (i.e. OA).       Anticipated barriers to progress:length of time since onset of impairments, seizures, recent medication changes, recent onset of dizziness     Impairment List:  Fall Risk - impaired balance   Weakness   Impaired motor control  Decreased ROM  Gait deviations   Decreased ambulation   Decreased activity tolerance   Difficulty participating in daily activities   Difficulty in participating in role as caregiver    Requires skilled supervision to complete and progress HEP   Requires skilled intervention and recommendations for AD/ orthotics     GOALS:    Status as of 8/3/18  Short term goals: 3-4 weeks, pt agrees to goals set.  1. Pt will perform HEP for basic  strengthening and ROM with supervision to improve carryover of progress. Ongoing- pt reports difficulty with maintaining L knee flexion during supine exercises  2. Pt will demonstrate improved L DF to 4 degrees to improve foot clearance to decreased LLE ER during gait. Met 6/1/18-   3. Pt will demonstrate improved gait and mobility by demonstrating TUG with SBQC in 15 sec. Met 6/1/18- 14.6 sec with SBQC  4. Pt will demonstrate improved motor control and use of LLE for mobility by performing 5 times sit<>stand test in 10 sec with UE use as needed. Met 7/6/18- 9.6 sec with UE support  5. Pt will demonstrate improved mobility/ gait and decreased fall risk from high to moderate by scoring 19/28 on Tinetti Assessment. Met 6/1/18- 24/28  6. Make appropriate recommendations for DME as needed. Met 6/1/18- pt provided with list of DME vendors, has prescription for RW/ or quad cane     Long term goals: 6 weeks, pt agrees to goals set  7. Pt will demonstrate improved mobility and decreased fall risk by performing TUG with appropriate AD in 12 sec. improved- 13.8 sec avg with LBQC and L AFO  8. Pt will demonstrate improved L DF to 10 degrees to demonstrate improved resting position/ mobility of foot/ ankle and improve foot clearance during gait. Met 8/3/18- 12 deg AAROM  9. Pt will demonstrate improved mobility/ gait by scoring 22/28 on Tinetti Assessment. Met 6/1/18- 24/28  10. Pt will ambulate with appropriate AD for 150 ft without significant ER of LLE to demonstrate improved gait quality and reduce abnormal motions which may cause a secondary impairment of LBP. Improving- pt demonstrates improved control of LLE ER during gait, but cannot consistently maintain  11. Pt will demonstrate improved gait velocity to 1.14 m/s with appropraite AD to demonstrate a 25% improvement in gait. Improved- 0.94 m/s but improved quality noted    Plan   Continue PT 2x weekly under established Plan of Care, with treatment to include: pt  education, HEP, therapeutic exercises, neuromuscular re-education/balance exercises, therapeutic activities, joint mobilizations, and modalities PRN, to work towards established goals. Pt may be seen by PTA to carry out plan of care.      Monica Mosley, PT  8/15/2018

## 2018-08-15 NOTE — PROGRESS NOTES
Occupational Therapy Daily Treatment Note     Name: Lacey Montana   Clinic Number: [id number]    Therapy Diagnosis:   Encounter Diagnoses   Name Primary?    Decreased range of motion of left shoulder Yes    Impaired activities of daily living     Muscle weakness of left upper extremity      Physician: Juan Pablo Anaya MD    Visit Date: 8/15/2018  Physician Orders: Evaluate and treat  Medical Diagnosis: CVA  Evaluation Date: 7/20/2018  Insurance Authorization Period Expiration: 12/31/2018  Plan of Care Certification Period: through 8/31/2018  Date of Return to MD: 8/20/2018    Visit # / Visits authorized: 7 / 20  Time In: 0900  Time Out: 0945   Total Billable Time: 45 minutes    Precautions:  Standard    Subjective     Pt reports: Pt had completed PT session and was wearing paddle splint taped to her L hand  Response to previous treatment: Improved passive and active shoulder mobility   Functional change: progressing towards goals     Pain: none reported   Location: NA      Objective     Lacey participated in dynamic functional therapeutic activities to improve functional performance for 45 minutes, including:  Pt was seated at EOM after PT session and was wearing paddle splint to her L hand with wrist support.  - Scapular mobilization for elevation, depression, adduction and protraction where AAROM was performed to L side.  - Assisted shoulder flexion from sitting at EOM combined with reaching for targets in various planes  - Pt sitting at EOM and was assisted with ER to L UE combined with WB into B UEs and scapular retraction    Pt placed in sitting for :   -  GG paddle splint removed and hand ROM continued with emphasis on digit and thumb extension.  - Mobilization of palm of L hand for stretching and metacarpal flexion and extension    - General wrist stretches including 1. Scaphoid on radius 2. Increasing mobility of metacarpals 3. Carpal rolls 4. Increasing mobility towards radial deviation 5.  Increasing mobility of wrist towards extension 6. Increasing mobility of wrist towards supination/pronation  - AAROM of shoulder functional movements for donning her purse over her head and shoulder movements for placement of L UE to manage her purse.  - Discussion of options of resting hand splint to manage her L hand at home and to prevent tightness in hand that is relieved in therapy sessions.     Supine:   - AAROM L bicep flexion and extension 3 ways x 6 reps each direction (palm up, palm down, and neutral)   - Elbow support applied to maintain extension   - AAROM L shoulder flexion with prolonged stretch at end range and movements for targets as directed by therapist.      Home Exercises and Education Provided     Education provided: POC, HEP   - Review of standing WB into open L hand on counter and emphasis on limiting grasping objects or holding on with her L hand.   - Progress towards goals - Goals remain appropriate      Written Home Exercises Provided: no new, continue current HEP.  Exercises were reviewed and Lacey was able to demonstrate them prior to the end of the session.  Lacey demonstrated good  understanding of the education provided.          Assessment   Ms Montana is excited about gains in movement in her L shoulder and although she had pain a beginning of session, she had no pain with movement at end of session. Improved movement is noted for simple UE activities.  Pt is diligent at attending sessions and takes a cab to and from therapy. She is highly motivated to participate and improve and stated that she performs her HEP. Pt is diligent at attending sessions and takes a cab to and from therapy. She is highly motivated to participate and improve and stated that she performs her HEP.  She is determined not to miss a session.     Lacey is progressing well towards her goals and there are no updates to goals at this time. Pt prognosis is Good.     Pt will continue to benefit from  skilled outpatient occupational therapy to address the deficits listed in the problem list on initial evaluation provide pt/family education and to maximize pt's level of independence in the home and community environment.     Anticipated barriers to occupational therapy: None at this time    Pt's spiritual, cultural and educational needs considered and pt agreeable to plan of care and goals.    Goals:    LTG GOALS:  Time frame: 6 weeks  Increase ROM/Strength to perform AL's and functional activities (specify)  Pt will increase L shoulder flexion AAROM to 100* as needed to improve dressing tasks incorporating L UE   Pt will increase L elbow flexion AAROM to 120* as needed to improve dressing task incorporating L UE   LB dressing will improve to Mod I     STG Goals:  Time frame: 3 weeks  Increase ROM/Strength to perform ADL's and functional activities (specify)  Pt will increase L shoulder flexion AROM to 90* as needed to improve dressing tasks incorporating L UE   Pt will increase L elbow flexion AROM to 100* as needed to improve dressing task incorporating L UE   Pt will improve  in L hand to register on Dynamometer in order to improve functional use of L hand   Pt will be independent with tying shoes using one-hand technique.   Pt will be independent with HEP to improve ROM to L UE.     Plan   Patient/caregiver understands and agrees with plan of care.  Outpatient occupational therapy 2  times weekly for 6 weeks  to include: pt ed, hep, therapeutic exercises, therapeutic activity, ADLs,  neuromuscular re-education, joint mobilizations, modalities prn,      Discussed Plan of Care with patient: Yes  Updates/Grading for next session: Continue increase in ROM to shoulder and hand and issue new HEPs.       IRIS Lopez   8/15/2018

## 2018-08-17 ENCOUNTER — DOCUMENTATION ONLY (OUTPATIENT)
Dept: REHABILITATION | Facility: HOSPITAL | Age: 63
End: 2018-08-17

## 2018-08-17 ENCOUNTER — CLINICAL SUPPORT (OUTPATIENT)
Dept: REHABILITATION | Facility: HOSPITAL | Age: 63
End: 2018-08-17
Attending: PHYSICAL MEDICINE & REHABILITATION
Payer: MEDICARE

## 2018-08-17 DIAGNOSIS — M25.612 DECREASED RANGE OF MOTION OF LEFT SHOULDER: ICD-10-CM

## 2018-08-17 DIAGNOSIS — Z78.9 IMPAIRED ACTIVITIES OF DAILY LIVING: Primary | ICD-10-CM

## 2018-08-17 DIAGNOSIS — Z78.9 IMPAIRED MOTOR CONTROL: ICD-10-CM

## 2018-08-17 DIAGNOSIS — M62.81 MUSCLE WEAKNESS OF LEFT UPPER EXTREMITY: ICD-10-CM

## 2018-08-17 DIAGNOSIS — Z74.09 IMPAIRED FUNCTIONAL MOBILITY, BALANCE, GAIT, AND ENDURANCE: ICD-10-CM

## 2018-08-17 PROCEDURE — 97530 THERAPEUTIC ACTIVITIES: CPT | Mod: PO | Performed by: OPTOMETRIST

## 2018-08-17 PROCEDURE — 97112 NEUROMUSCULAR REEDUCATION: CPT | Mod: PO

## 2018-08-17 NOTE — PROGRESS NOTES
Physical Therapy Progress Note      Name: Lacey Montana  [facility] Number: [id number]  Diagnosis:        Encounter Diagnoses   Name Primary?    Impaired functional mobility, balance, gait, and endurance      Impaired motor control        Physician: Juan Pablo Anaya MD  Treatment Orders: PT Evaluation and Treatment       Past Medical History:   Diagnosis Date    Anticoagulant long-term use      Encounter for blood transfusion      GERD (gastroesophageal reflux disease)      Seizures      Stroke           Precautions: universal  Precautions: universal  Visit #: [id number]  Date of Eval: 5/01/18  Plan of Care Expiration: 10/2/18     Functional Limitations Reports - G Codes  Category: mobility   Tool: tinetti, TUG, SSWS  Score: 24/28, 13.8 sec with LBQC, 0.94 m/s with LBQC     G codes 4/10    eval CJ-CI   6/1/18 CJ-CI   7/6/18 CJ-CI   8/3/18 CJ-CI            Subjective   Pt reports: no new complaints    Pain Scale: no complaints     Objective     pt participated in neuromuscular re education x 45 minutes to address motor control and coordination to improve all mobility and balance:      EOM:  Restoration of arches of L hand  6 basic wrist stretches: 1. P-A glide of scaphoid on radius, 2. Radial deviation,               3. Increasing mobility of metacarpals, 4. Carpal roll, 5. Movement of forearm on    hand towards wrist extension, 6. Movement of forearm on the hand toward        supination/ pronation  Paddle splint applied to L  90 deg wrist ext brace applied L    Facilitation of support of LUE    DF stretch with wedge with restoration of arches + toe extension  + toe abd- with forefoot abd noted   BAPS board level 3, DF with Min A to control movements (IV + toe flex + DF noted)    ballet bar: tandem gait front, RUE support- 4 laps    Backwards gait with RUE support, mod A for backwards (LLE placement)- 4 laps     NP today:   pregait without RUE support:   2 x 10  reps of R LE target taps to 4 inch step returning to midline after each to promote weight shifting with 1 UE support and CGA  2 x 10 reps of R LE target taps to 4 inch and 8 inch steps w/o turning to midline with 1 UE support and CGA  1 x 10 reps of LLE single leg step up onto 4 inch step with 1 UE support and CGA np due to time  X 4 laps of forward ambulation stepping over small discs working np due to time         Written Home Exercises:   Pt demo good understanding of the education provided including: bridges with ball squeeze, internal rotation hip stretch and PPT. Lacey demonstrated good return demonstration of activities.      Education provided re: POC, HEP  No spiritual or educational barriers to learning provided     Pt has no cultural, educational or language barriers to learning provided.     Assessment   Lacey continued to tolerate treatment well. Could benefit from performed sit to stands without rotating hips and distributing weight on BLEs. PT addressed LUE/ hand ROM and support of body on limb in sitting. Tandem gait w/ UE support was practiced to address LLE ER during gait. Pt required assistance to decrease/ correct LLE ER during backwards gait.       Pt can benefit from continued skilled PT to address impairments to improve quality of mobility to improve balance, decrease fall risk, and decrease possibility of secondary impairments (i.e. OA).       Anticipated barriers to progress:length of time since onset of impairments, seizures, recent medication changes, recent onset of dizziness     Impairment List:  Fall Risk - impaired balance   Weakness   Impaired motor control  Decreased ROM  Gait deviations   Decreased ambulation   Decreased activity tolerance   Difficulty participating in daily activities   Difficulty in participating in role as caregiver    Requires skilled supervision to complete and progress HEP   Requires skilled intervention and recommendations for AD/ orthotics     GOALS:     Status as of 8/3/18  Short term goals: 3-4 weeks, pt agrees to goals set.  1. Pt will perform HEP for basic strengthening and ROM with supervision to improve carryover of progress. Ongoing- pt reports difficulty with maintaining L knee flexion during supine exercises  2. Pt will demonstrate improved L DF to 4 degrees to improve foot clearance to decreased LLE ER during gait. Met 6/1/18-   3. Pt will demonstrate improved gait and mobility by demonstrating TUG with SBQC in 15 sec. Met 6/1/18- 14.6 sec with SBQC  4. Pt will demonstrate improved motor control and use of LLE for mobility by performing 5 times sit<>stand test in 10 sec with UE use as needed. Met 7/6/18- 9.6 sec with UE support  5. Pt will demonstrate improved mobility/ gait and decreased fall risk from high to moderate by scoring 19/28 on Tinetti Assessment. Met 6/1/18- 24/28  6. Make appropriate recommendations for DME as needed. Met 6/1/18- pt provided with list of DME vendors, has prescription for RW/ or quad cane     Long term goals: 6 weeks, pt agrees to goals set  7. Pt will demonstrate improved mobility and decreased fall risk by performing TUG with appropriate AD in 12 sec. improved- 13.8 sec avg with LBQC and L AFO  8. Pt will demonstrate improved L DF to 10 degrees to demonstrate improved resting position/ mobility of foot/ ankle and improve foot clearance during gait. Met 8/3/18- 12 deg AAROM  9. Pt will demonstrate improved mobility/ gait by scoring 22/28 on Tinetti Assessment. Met 6/1/18- 24/28  10. Pt will ambulate with appropriate AD for 150 ft without significant ER of LLE to demonstrate improved gait quality and reduce abnormal motions which may cause a secondary impairment of LBP. Improving- pt demonstrates improved control of LLE ER during gait, but cannot consistently maintain  11. Pt will demonstrate improved gait velocity to 1.14 m/s with appropraite AD to demonstrate a 25% improvement in gait. Improved- 0.94 m/s but improved  quality noted    Plan   Continue PT 2x weekly under established Plan of Care, with treatment to include: pt education, HEP, therapeutic exercises, neuromuscular re-education/balance exercises, therapeutic activities, joint mobilizations, and modalities PRN, to work towards established goals. Pt may be seen by PTA to carry out plan of care.      Nic Weiss, PTA  8/17/2018

## 2018-08-17 NOTE — PROGRESS NOTES
Occupational Therapy Daily Treatment Note     Name: Lacey Montana   Clinic Number: [id number]    Therapy Diagnosis:   Encounter Diagnoses   Name Primary?    Decreased range of motion of left shoulder     Impaired activities of daily living Yes    Muscle weakness of left upper extremity      Physician: Juan Pablo Anaya MD    Visit Date: 8/17/2018  Physician Orders: Evaluate and treat  Medical Diagnosis: CVA  Evaluation Date: 7/20/2018  Insurance Authorization Period Expiration: 12/31/2018  Plan of Care Certification Period: through 8/31/2018  Date of Return to MD: 8/20/2018    Visit # / Visits authorized: 8 / 20  Time In: 0815  Time Out: 0900   Total Billable Time: 45 minutes    Precautions:  Standard    Subjective     Pt reports: That after session she can move her hand a left hand and arm more.   Response to previous treatment: Improvement in passive and active shoulder movements   Functional change: progressing towards goals     Pain: none reported   Location: NA      Objective     Lacey participated in dynamic functional therapeutic activities to improve functional performance for 45 minutes, including:  Pt was seated at EOM after PT session:  and was wearing paddle splint to her L hand with wrist support.  Continued from EOM:   - Mobilization of palm of L hand for stretching and metacarpal flexion and extension    - General wrist stretches including 1. Scaphoid on radius 2. Increasing mobility of metacarpals 3. Carpal rolls 4. Increasing mobility towards radial deviation 5. Increasing mobility of wrist towards extension 6. Increasing mobility of wrist towards supination/pronation  - Placement of L hand on hard surface and given cues to lean forward to promote extension of wrist  - GG paddle splint donned   - Scapular mobilization for elevation, depression, adduction and protraction where AAROM was performed to L side.  - Assisted shoulder flexion from sitting at EOM combined with reaching for  targets in various planes    From supine:   - AAROM L bicep flexion and extension 3 ways x 6 reps each direction (palm up, palm down, and neutral)   - AAROM L shoulder flexion with prolonged stretch at end range and movements for targets as directed by therapist.    After return to sitting EOM:   -GG paddle splint removed and hand mobilized for palm stretching and metacarpal movements  - Pt assessed for digits extension where she was able to demonstrate Trace movements.       Home Exercises and Education Provided     Education provided: POC, HEP   - Review of standing WB into open L hand on counter and emphasis on limiting grasping objects or holding on with her L hand.   - Progress towards goals - Goals remain appropriate      Written Home Exercises Provided: no new, continue current HEP.  Exercises were reviewed and Lacey was able to demonstrate them prior to the end of the session.  Lacey demonstrated good  understanding of the education provided.          Assessment   Ms Montana had no pain reported in shoulder ROM but did state tightness in some movements. She was able to tolerate increased shoulder flexion this day and improved movement is noted for simple UE activities like moving her arm for placement on EOM and putting her arm in her shoulder strap for her purse.  Pt is diligent at attending sessions and takes a cab to and from therapy. She is highly motivated to participate and improve and stated that she performs her HEP. Pt is diligent at attending sessions and takes a cab to and from therapy. She is highly motivated to participate and improve and stated that she performs her HEP.  She is determined not to miss a session.     Lacey is progressing well towards her goals and there are no updates to goals at this time. Pt prognosis is Good.     Pt will continue to benefit from skilled outpatient occupational therapy to address the deficits listed in the problem list on initial evaluation provide  pt/family education and to maximize pt's level of independence in the home and community environment.     Anticipated barriers to occupational therapy: None at this time    Pt's spiritual, cultural and educational needs considered and pt agreeable to plan of care and goals.    Goals:    LTG GOALS:  Time frame: 6 weeks  Increase ROM/Strength to perform AL's and functional activities (specify)  Pt will increase L shoulder flexion AAROM to 100* as needed to improve dressing tasks incorporating L UE   Pt will increase L elbow flexion AAROM to 120* as needed to improve dressing task incorporating L UE   LB dressing will improve to Mod I     STG Goals:  Time frame: 3 weeks  Increase ROM/Strength to perform ADL's and functional activities (specify)  Pt will increase L shoulder flexion AROM to 90* as needed to improve dressing tasks incorporating L UE   Pt will increase L elbow flexion AROM to 100* as needed to improve dressing task incorporating L UE   Pt will improve  in L hand to register on Dynamometer in order to improve functional use of L hand   Pt will be independent with tying shoes using one-hand technique.   Pt will be independent with HEP to improve ROM to L UE.     Plan   Patient/caregiver understands and agrees with plan of care.  Outpatient occupational therapy 2  times weekly for 6 weeks  to include: pt ed, hep, therapeutic exercises, therapeutic activity, ADLs,  neuromuscular re-education, joint mobilizations, modalities prn,      Discussed Plan of Care with patient: Yes  Updates/Grading for next session: Continue increase in ROM to shoulder and hand and issue new HEPs.       IRIS Lopez   8/17/2018

## 2018-08-24 ENCOUNTER — CLINICAL SUPPORT (OUTPATIENT)
Dept: REHABILITATION | Facility: HOSPITAL | Age: 63
End: 2018-08-24
Attending: PHYSICAL MEDICINE & REHABILITATION
Payer: MEDICARE

## 2018-08-24 DIAGNOSIS — M25.612 DECREASED RANGE OF MOTION OF LEFT SHOULDER: ICD-10-CM

## 2018-08-24 DIAGNOSIS — Z78.9 IMPAIRED ACTIVITIES OF DAILY LIVING: ICD-10-CM

## 2018-08-24 DIAGNOSIS — M62.81 MUSCLE WEAKNESS OF LEFT UPPER EXTREMITY: ICD-10-CM

## 2018-08-24 DIAGNOSIS — Z74.09 IMPAIRED FUNCTIONAL MOBILITY, BALANCE, GAIT, AND ENDURANCE: ICD-10-CM

## 2018-08-24 DIAGNOSIS — Z78.9 IMPAIRED MOTOR CONTROL: ICD-10-CM

## 2018-08-24 DIAGNOSIS — M25.60 DECREASED RANGE OF MOTION: Primary | ICD-10-CM

## 2018-08-24 PROCEDURE — 97112 NEUROMUSCULAR REEDUCATION: CPT | Mod: KX,PO

## 2018-08-24 PROCEDURE — 97530 THERAPEUTIC ACTIVITIES: CPT | Mod: PO | Performed by: OPTOMETRIST

## 2018-08-24 NOTE — PROGRESS NOTES
Occupational Therapy Daily Treatment Note     Name: Lacey Montana   Clinic Number: [id number]    Therapy Diagnosis:   Encounter Diagnoses   Name Primary?    Decreased range of motion Yes    Muscle weakness of left upper extremity     Impaired activities of daily living     Decreased range of motion of left shoulder      Physician: Juan Pablo Anaya MD    Visit Date: 8/24/2018  Physician Orders: Evaluate and treat  Medical Diagnosis: CVA  Evaluation Date: 7/20/2018  Insurance Authorization Period Expiration: 12/31/2018  Plan of Care Certification Period: through 8/31/2018  Date of Return to MD: 8/20/2018    Visit # / Visits authorized: 9 / 20  Time In: 0900  Time Out: 0945   Total Billable Time: 45 minutes    Precautions:  Standard    Subjective     Pt reports: That she will not have much help to mark the splint at home.   Response to previous treatment: Improvement in passive and active shoulder movements   Functional change: progressing towards goals     Pain: none reported   Location: NA      Objective     Lacey participated in dynamic functional therapeutic activities to improve functional performance for 45 minutes, including:  Pt was seated at EOM after PT session:   Continued from EOM:   - Mobilization of palm of L hand for stretching and metacarpal flexion and extension    - General wrist stretches including 1. Scaphoid on radius 2. Increasing mobility of metacarpals 3. Carpal rolls 4. Increasing mobility towards radial deviation 5. Increasing mobility of wrist towards extension 6. Increasing mobility of wrist towards supination/pronation  - Placement of L hand on hard surface and given cues to lean forward to promote extension of wrist  - GG paddle splint donned and was reviewed with Pt and her granddaughter (although GD is too young to be able to learn to mark splint on her own)     - Scapular mobilization for elevation, depression, adduction and protraction where AAROM was performed to L  side.  - Assisted shoulder flexion from sitting at EOM combined with reaching for targets in various planes      After return to sitting EOM:   -GG paddle splint removed and hand mobilized for palm stretching and metacarpal movements and donning reviewed with Pt  - Pt assessed for digits extension where she was able to demonstrate Trace movements.     Pt was given this therapist's GG paddle splint to borrow this weekend to see if she would be interested in purchasing one.       Home Exercises and Education Provided     Education provided: POC, HEP   - Review of standing WB into open L hand on counter and emphasis on limiting grasping objects or holding on with her L hand.   - Progress towards goals - Goals remain appropriate      Written Home Exercises Provided: no new, continue current HEP.  Exercises were reviewed and Lacey was able to demonstrate them prior to the end of the session.  Lacey demonstrated good  understanding of the education provided.          Assessment   Ms Montana was able to tolerate increased shoulder flexion this day and improved movement is noted for simple UE activities. She is very willing to do more at home to help with her recovery and will use the GG paddle splint at home.  Pt is diligent at attending sessions and takes a cab to and from therapy. She is highly motivated to participate and improve and stated that she performs her HEP. Pt is diligent at attending sessions and takes a cab to and from therapy. She is highly motivated to participate and improve and stated that she performs her HEP.  She is determined not to miss a session.     Lacey is progressing well towards her goals and there are no updates to goals at this time. Pt prognosis is Good.     Pt will continue to benefit from skilled outpatient occupational therapy to address the deficits listed in the problem list on initial evaluation provide pt/family education and to maximize pt's level of independence in the  home and community environment.     Anticipated barriers to occupational therapy: None at this time    Pt's spiritual, cultural and educational needs considered and pt agreeable to plan of care and goals.    Goals:    LTG GOALS:  Time frame: 6 weeks  Increase ROM/Strength to perform AL's and functional activities (specify)  Pt will increase L shoulder flexion AAROM to 100* as needed to improve dressing tasks incorporating L UE   Pt will increase L elbow flexion AAROM to 120* as needed to improve dressing task incorporating L UE   LB dressing will improve to Mod I     STG Goals:  Time frame: 3 weeks  Increase ROM/Strength to perform ADL's and functional activities (specify)  Pt will increase L shoulder flexion AROM to 90* as needed to improve dressing tasks incorporating L UE   Pt will increase L elbow flexion AROM to 100* as needed to improve dressing task incorporating L UE   Pt will improve  in L hand to register on Dynamometer in order to improve functional use of L hand   Pt will be independent with tying shoes using one-hand technique.   Pt will be independent with HEP to improve ROM to L UE.     Plan   Patient/caregiver understands and agrees with plan of care.  Outpatient occupational therapy 2  times weekly for 6 weeks  to include: pt ed, hep, therapeutic exercises, therapeutic activity, ADLs,  neuromuscular re-education, joint mobilizations, modalities prn,      Discussed Plan of Care with patient: Yes  Updates/Grading for next session: Continue increase in ROM to shoulder and hand and issue new HEPs.       IRIS Lopez   8/24/2018

## 2018-08-24 NOTE — PROGRESS NOTES
Physical Therapy Progress Note      Name: Lacey Montana  [facility] Number: [id number]  Diagnosis:        Encounter Diagnoses   Name Primary?    Impaired functional mobility, balance, gait, and endurance      Impaired motor control        Physician: Juan Pablo Anaya MD  Treatment Orders: PT Evaluation and Treatment       Past Medical History:   Diagnosis Date    Anticoagulant long-term use      Encounter for blood transfusion      GERD (gastroesophageal reflux disease)      Seizures      Stroke           Precautions: universal  Precautions: universal  Visit #: [id number]  Date of Eval: 5/01/18  Plan of Care Expiration: 10/2/18     Functional Limitations Reports - G Codes  Category: mobility   Tool: tinetti, TUG, SSWS  Score: 24/28, 13.8 sec with LBQC, 0.94 m/s with LBQC     G codes 5/10    eval CJ-CI   6/1/18 CJ-CI   7/6/18 CJ-CI   8/3/18 CJ-CI            Subjective   Pt reports: no new complaints    Pain Scale: no complaints     Objective     pt participated in neuromuscular re education x 45 minutes to address motor control and coordination to improve all mobility and balance:      EOM:  Restoration of arches of L hand  6 basic wrist stretches: 1. P-A glide of scaphoid on radius, 2. Radial deviation,               3. Increasing mobility of metacarpals, 4. Carpal roll, 5. Movement of forearm on    hand towards wrist extension, 6. Movement of forearm on the hand toward  supination/ pronation    Paddle splint applied to L  90 deg wrist ext brace applied L NP    Facilitation of support of LUE    Prone on forearms: rocking side to side left UE weight bearing and ventral body stretch    DF stretch with wedge with restoration of arches + toe extension  + toe abd- with forefoot abd noted   BAPS board level 3, DF with Min A to control movements (IV + toe flex + DF noted)    // Bars tandem gait front, RUE support- 4 laps              Fwd gait with no UE support 4 laps  "    Backwards gait with RUE support, mod A for backwards (LLE placement)- 4 laps                      pregait without RUE support:   2 x 10 reps of R LE target taps to 4 inch step returning to midline after each to promote weight shifting with 1 UE support and CGA  2 x 10 reps of R LE target taps to 4 inch and 8 inch steps w/o turning to midline with 1 UE support and CGA  3 x 10 reps of LLE single leg step up onto 6 inch step with 1 UE support and CGA np due to time  3 x 30" left SLS w/contralateral foot on 6" step right UE support    Gait with SBQC 50" x 2  Close SBA           Written Home Exercises:   Pt demo good understanding of the education provided including: bridges with ball squeeze, internal rotation hip stretch and PPT. Lacey demonstrated good return demonstration of activities.      Education provided re: POC, HEP  No spiritual or educational barriers to learning provided     Pt has no cultural, educational or language barriers to learning provided.     Assessment   Lacey continued to tolerate treatment well.  Tandem gait w/ UE support was practiced to address LLE ER during gait.       Pt can benefit from continued skilled PT to address impairments to improve quality of mobility to improve balance, decrease fall risk, and decrease possibility of secondary impairments (i.e. OA).       Anticipated barriers to progress:length of time since onset of impairments, seizures, recent medication changes, recent onset of dizziness     Impairment List:  Fall Risk - impaired balance   Weakness   Impaired motor control  Decreased ROM  Gait deviations   Decreased ambulation   Decreased activity tolerance   Difficulty participating in daily activities   Difficulty in participating in role as caregiver    Requires skilled supervision to complete and progress HEP   Requires skilled intervention and recommendations for AD/ orthotics     GOALS:    Status as of 8/3/18  Short term goals: 3-4 weeks, pt agrees to goals " set.  1. Pt will perform HEP for basic strengthening and ROM with supervision to improve carryover of progress. Ongoing- pt reports difficulty with maintaining L knee flexion during supine exercises  2. Pt will demonstrate improved L DF to 4 degrees to improve foot clearance to decreased LLE ER during gait. Met 6/1/18-   3. Pt will demonstrate improved gait and mobility by demonstrating TUG with SBQC in 15 sec. Met 6/1/18- 14.6 sec with SBQC  4. Pt will demonstrate improved motor control and use of LLE for mobility by performing 5 times sit<>stand test in 10 sec with UE use as needed. Met 7/6/18- 9.6 sec with UE support  5. Pt will demonstrate improved mobility/ gait and decreased fall risk from high to moderate by scoring 19/28 on Tinetti Assessment. Met 6/1/18- 24/28  6. Make appropriate recommendations for DME as needed. Met 6/1/18- pt provided with list of DME vendors, has prescription for RW/ or quad cane     Long term goals: 6 weeks, pt agrees to goals set  7. Pt will demonstrate improved mobility and decreased fall risk by performing TUG with appropriate AD in 12 sec. improved- 13.8 sec avg with LBQC and L AFO  8. Pt will demonstrate improved L DF to 10 degrees to demonstrate improved resting position/ mobility of foot/ ankle and improve foot clearance during gait. Met 8/3/18- 12 deg AAROM  9. Pt will demonstrate improved mobility/ gait by scoring 22/28 on Tinetti Assessment. Met 6/1/18- 24/28  10. Pt will ambulate with appropriate AD for 150 ft without significant ER of LLE to demonstrate improved gait quality and reduce abnormal motions which may cause a secondary impairment of LBP. Improving- pt demonstrates improved control of LLE ER during gait, but cannot consistently maintain  11. Pt will demonstrate improved gait velocity to 1.14 m/s with appropraite AD to demonstrate a 25% improvement in gait. Improved- 0.94 m/s but improved quality noted    Plan   Continue PT 2x weekly under established Plan of  Care, with treatment to include: pt education, HEP, therapeutic exercises, neuromuscular re-education/balance exercises, therapeutic activities, joint mobilizations, and modalities PRN, to work towards established goals. Pt may be seen by PTA to carry out plan of care.      Shahram Louis, PTA  8/24/2018

## 2018-08-27 ENCOUNTER — CLINICAL SUPPORT (OUTPATIENT)
Dept: REHABILITATION | Facility: HOSPITAL | Age: 63
End: 2018-08-27
Attending: PHYSICAL MEDICINE & REHABILITATION
Payer: MEDICARE

## 2018-08-27 DIAGNOSIS — M25.612 DECREASED RANGE OF MOTION OF LEFT SHOULDER: Primary | ICD-10-CM

## 2018-08-27 DIAGNOSIS — Z74.09 IMPAIRED FUNCTIONAL MOBILITY, BALANCE, GAIT, AND ENDURANCE: ICD-10-CM

## 2018-08-27 DIAGNOSIS — Z78.9 IMPAIRED ACTIVITIES OF DAILY LIVING: ICD-10-CM

## 2018-08-27 DIAGNOSIS — M62.81 MUSCLE WEAKNESS OF LEFT UPPER EXTREMITY: ICD-10-CM

## 2018-08-27 DIAGNOSIS — Z78.9 IMPAIRED MOTOR CONTROL: ICD-10-CM

## 2018-08-27 PROCEDURE — G8988 SELF CARE GOAL STATUS: HCPCS | Mod: CJ,PO | Performed by: OPTOMETRIST

## 2018-08-27 PROCEDURE — 97530 THERAPEUTIC ACTIVITIES: CPT | Mod: PO | Performed by: OPTOMETRIST

## 2018-08-27 PROCEDURE — G8987 SELF CARE CURRENT STATUS: HCPCS | Mod: CK,PO | Performed by: OPTOMETRIST

## 2018-08-27 PROCEDURE — 97112 NEUROMUSCULAR REEDUCATION: CPT | Mod: PO

## 2018-08-27 NOTE — PROGRESS NOTES
"  Occupational Therapy Reassessment      Name: Lacey Montana   Clinic Number: [id number]    Therapy Diagnosis:   Encounter Diagnoses   Name Primary?    Decreased range of motion of left shoulder Yes    Impaired activities of daily living     Muscle weakness of left upper extremity      Physician: Juan Pablo Anaya MD    Visit Date: 8/27/2018  Physician Orders: Evaluate and treat  Medical Diagnosis: CVA  Evaluation Date: 7/20/2018  Insurance Authorization Period Expiration: 12/31/2018  Plan of Care Certification Period: through 8/31/2018  Date of Return to MD: 8/20/2018    Visit # / Visits authorized: 10 / 20  Time In: 0900  Time Out: 0945   Total Billable Time: 45 minutes    Precautions:  Standard    Subjective     Pt reports: " I tried wearing the splint over the weekend but I'm having a little trouble putting it on."    Response to previous treatment: Improvement in passive and active shoulder ROM   Functional change: progressing towards goals     Pain: none reported   Location: NA      Objective     Lacey participated in dynamic functional therapeutic activities to improve functional performance for 45 minutes, including:  Pt had completed PT session and was seated at EOM and ROM and strength was assessed as follows:      Joint Evaluation AROM Today, Eval   PROM Today, Eval      Left Right Left   Shoulder flex 0-180 80,60 WFL throughout 140,140   Shoulder Abd 0-180 80,80   150,130   Shoulder ER 0-90 0, 0   70,0   Shoulder IR 0-90 80,90   90, 90   Shoulder Extension 0-80 30,20   80,80   Elbow flex/ext 0-150 0-110, 0-90   0-140, 0-140   Wrist flex 0-80 10, 10   70, 70   Wrist ext 0-70 0, 0   70, 70   Supination 0-80 1/2 range, same   WFL   Pronation 0-80 1/2 range, same   WFL         Strength   Right UE WFL     Left  (no change from eval)   Shoulder flex P+   Shoulder abd P+   Shoulder ER T   Shoulder IR F-   Shoulder Extension P+   Elbow flex P+   Elbow ext P+   Wrist flex P+   Wrist ext P   Supination " P-   Pronation F-          Strength: Unable to register on dynamometer at eval but was able to register today for 11#     Fine motor coordination: Total impairment on L      Gross motor coordination: Significant impairment on L     Goals were reviewed and FOTO survey was done. Treatment followed:     - Mobilization of palm of L hand for stretching and metacarpal flexion and extension    - General wrist stretches including 1. Scaphoid on radius 2. Increasing mobility of metacarpals 3. Carpal rolls 4. Increasing mobility towards radial deviation 5. Increasing mobility of wrist towards extension 6. Increasing mobility of wrist towards supination/pronation  - Placement of L hand on hard surface and given cues to lean forward to promote extension of wrist  - GG paddle splint donned and was reviewed with Pt   - Scapular mobilization for elevation, depression, adduction and protraction where AAROM was performed to L side.  - Assisted shoulder flexion from sitting at EOM combined with reaching for targets in various planes    Pt stated that she was having difficulty with donning the GG paddle splint that she took home over the weekend so proper placement on her hand and wrist was reviewed. Pt decided to leave the splint on after application and expressed better understanding of how to mark it.     CMS Impairment/Limitation/Restriction for FOTO Cerebrovascular Disorders Survey  Status Limitation G-Code CMS Severity Modifier  Intake 63% 37%  Predicted 64% 36% Goal Status+ CJ - At least 20 percent but less than 40 percent  8/27/2018 53% 47% Current Status CK - At least 40 percent but less than 60 percent  D/C Status CK **only report if this is discharge survey  +Based on FOTO predicted change score      Home Exercises and Education Provided     Education provided:  - Demonstration and training to mark GG paddle splint for home use   - Review of standing WB into open L hand on counter and emphasis on limiting grasping  objects or holding on with her L hand.   - Progress towards goals - Pt now has very minimal  strength     Written Home Exercises Provided: no new, continue current HEP.  Exercises were reviewed and Lacey was able to demonstrate them prior to the end of the session.  Lacey demonstrated good  understanding of the education provided.          Assessment   Ms Montana demonstrated a gain in  strength this day as she was not able to show any at Eval.  She is very willing to do more at home to help with her recovery and is using the GG paddle splint at home. She is highly motivated to participate and improve and stated that she performs her HEP. Pt is diligent at attending sessions and takes a cab to and from therapy. She is highly motivated to participate and improve and stated that she performs her HEP.      Lacey is progressing well towards her goals and there are no updates to goals at this time. Pt prognosis is Good.     Pt will continue to benefit from skilled outpatient occupational therapy to address the deficits listed in the problem list on initial evaluation provide pt/family education and to maximize pt's level of independence in the home and community environment.     Anticipated barriers to occupational therapy: None at this time    Pt's spiritual, cultural and educational needs considered and pt agreeable to plan of care and goals.    Goals:    LTG GOALS:  Time frame: 6 weeks  Increase ROM/Strength to perform AL's and functional activities (specify)  Pt will increase L shoulder flexion AAROM to 100* as needed to improve dressing tasks incorporating L UE - Progressing  Pt will increase L elbow flexion AAROM to 120* as needed to improve dressing task incorporating L UE  - Progressing  LB dressing will improve to Mod I - Progressing      STG Goals:  Time frame: 3 weeks  Increase ROM/Strength to perform ADL's and functional activities (specify)  Pt will increase L shoulder flexion AROM to 90*  as needed to improve dressing tasks incorporating L UE - NOT MET but progressing   Pt will increase L elbow flexion AROM to 100* as needed to improve dressing task incorporating L UE - Not met but progressing  Pt will improve  in L hand to register on Dynamometer in order to improve functional use of L hand - MET   Pt will be independent with tying shoes using one-hand technique. - MET   Pt will be independent with HEP to improve ROM to L UE. - MET     Plan   Patient/caregiver understands and agrees with plan of care.  Outpatient occupational therapy 2  times weekly for 6 weeks  to include: pt ed, hep, therapeutic exercises, therapeutic activity, ADLs,  neuromuscular re-education, joint mobilizations, modalities prn,      Discussed Plan of Care with patient: Yes  Updates/Grading for next session: Continue increase in ROM to shoulder and hand and issue new HEPs.       IRIS Lopez   8/27/2018

## 2018-08-27 NOTE — PROGRESS NOTES
"                                                      Physical Therapy Progress Note      Name: Lacey Montana  Clinic Number: [id number]  Diagnosis:        Encounter Diagnoses   Name Primary?    Impaired functional mobility, balance, gait, and endurance      Impaired motor control        Physician: Juan Pablo Anaya MD  Treatment Orders: PT Evaluation and Treatment       Past Medical History:   Diagnosis Date    Anticoagulant long-term use      Encounter for blood transfusion      GERD (gastroesophageal reflux disease)      Seizures      Stroke           Precautions: universal  Precautions: universal  Visit #: [id number]  Date of Eval: 5/01/18  Plan of Care Expiration: 10/2/18     Functional Limitations Reports - G Codes  Category: mobility   Tool: tinetti, TUG, SSWS  Score: 24/28, 13.8 sec with LBQC, 0.94 m/s with LBQC     G codes 5/10    eval CJ-CI   6/1/18 CJ-CI   7/6/18 CJ-CI   8/3/18 CJ-CI            Subjective   Pt reports: no new complaints    Pain Scale: no complaints     Objective     pt participated in neuromuscular re education x 45 minutes to address motor control and coordination to improve all mobility and balance:      EOM:  Restoration of arches of L hand  6 basic wrist stretches: 1. P-A glide of scaphoid on radius, 2. Radial deviation,               3. Increasing mobility of metacarpals, 4. Carpal roll, 5. Movement of forearm on    hand towards wrist extension, 6. Movement of forearm on the hand toward  supination/ pronation      Patient presents with [place] splint on but asked to remove it due to discomfort    Facilitation of support of LUE    Prone on forearms: rocking side to side left UE weight bearing and ventral body stretch                                   Quad stretch 3 x 30"                                    Left HSC 2 x 10 AAROM    Supine: Bridges 2 x 10                HSS 3 x 30"               SKC stretch 3 x 30"                Hip add stretch 3 x 30"                HIp IR/ER " "stretch 3 x 30"      DF stretch with wedge with restoration of arches  BAPS board level 3, DF with Min A to control movements    // Bars tandem gait front, RUE support- 4 laps NP              Fwd gait with no UE support 4 laps NP     Backwards gait with RUE support, mod A for backwards (LLE placement)- 4 laps                       pregait without RUE support:   2 x 10 reps of R LE target taps to 4 inch step returning to midline after each to promote weight shifting with 1 UE support and CGA  2 x 10 reps of R LE target taps to 4 inch  steps w/o turning to midline with 1 UE support and CGA  3 x 10 reps of LLE single leg step up onto 6 inch step with 1 UE support and CGA np due to time  3 x 30" left SLS w/contralateral foot on 6" step right UE support    Gait with SBQC 75" x 2  Close SBA           Written Home Exercises:   Pt demo good understanding of the education provided including: bridges with ball squeeze, internal rotation hip stretch and PPT. Lacey demonstrated good return demonstration of activities.      Education provided re: POC, HEP  No spiritual or educational barriers to learning provided     Pt has no cultural, educational or language barriers to learning provided.     Assessment   Lacey continued to tolerate treatment well.  Tandem gait w/ UE support was practiced to address LLE ER during gait.       Pt can benefit from continued skilled PT to address impairments to improve quality of mobility to improve balance, decrease fall risk, and decrease possibility of secondary impairments (i.e. OA).       Anticipated barriers to progress:length of time since onset of impairments, seizures, recent medication changes, recent onset of dizziness     Impairment List:  Fall Risk - impaired balance   Weakness   Impaired motor control  Decreased ROM  Gait deviations   Decreased ambulation   Decreased activity tolerance   Difficulty participating in daily activities   Difficulty in participating in role as " caregiver    Requires skilled supervision to complete and progress HEP   Requires skilled intervention and recommendations for AD/ orthotics     GOALS:    Status as of 8/3/18  Short term goals: 3-4 weeks, pt agrees to goals set.  1. Pt will perform HEP for basic strengthening and ROM with supervision to improve carryover of progress. Ongoing- pt reports difficulty with maintaining L knee flexion during supine exercises  2. Pt will demonstrate improved L DF to 4 degrees to improve foot clearance to decreased LLE ER during gait. Met 6/1/18-   3. Pt will demonstrate improved gait and mobility by demonstrating TUG with SBQC in 15 sec. Met 6/1/18- 14.6 sec with SBQC  4. Pt will demonstrate improved motor control and use of LLE for mobility by performing 5 times sit<>stand test in 10 sec with UE use as needed. Met 7/6/18- 9.6 sec with UE support  5. Pt will demonstrate improved mobility/ gait and decreased fall risk from high to moderate by scoring 19/28 on Tinetti Assessment. Met 6/1/18- 24/28  6. Make appropriate recommendations for DME as needed. Met 6/1/18- pt provided with list of DME vendors, has prescription for RW/ or quad cane     Long term goals: 6 weeks, pt agrees to goals set  7. Pt will demonstrate improved mobility and decreased fall risk by performing TUG with appropriate AD in 12 sec. improved- 13.8 sec avg with LBQC and L AFO  8. Pt will demonstrate improved L DF to 10 degrees to demonstrate improved resting position/ mobility of foot/ ankle and improve foot clearance during gait. Met 8/3/18- 12 deg AAROM  9. Pt will demonstrate improved mobility/ gait by scoring 22/28 on Tinetti Assessment. Met 6/1/18- 24/28  10. Pt will ambulate with appropriate AD for 150 ft without significant ER of LLE to demonstrate improved gait quality and reduce abnormal motions which may cause a secondary impairment of LBP. Improving- pt demonstrates improved control of LLE ER during gait, but cannot consistently  maintain  11. Pt will demonstrate improved gait velocity to 1.14 m/s with appropraite AD to demonstrate a 25% improvement in gait. Improved- 0.94 m/s but improved quality noted    Plan   Continue PT 2x weekly under established Plan of Care, with treatment to include: pt education, HEP, therapeutic exercises, neuromuscular re-education/balance exercises, therapeutic activities, joint mobilizations, and modalities PRN, to work towards established goals. Pt may be seen by PTA to carry out plan of care.      Shahram Louis, MARILYN  8/27/2018

## 2018-08-28 ENCOUNTER — TELEPHONE (OUTPATIENT)
Dept: PHYSICAL MEDICINE AND REHAB | Facility: CLINIC | Age: 63
End: 2018-08-28

## 2018-08-28 DIAGNOSIS — G81.94 LEFT HEMIPARESIS: ICD-10-CM

## 2018-08-28 DIAGNOSIS — Z74.09 IMPAIRED FUNCTIONAL MOBILITY, BALANCE, GAIT, AND ENDURANCE: ICD-10-CM

## 2018-08-28 DIAGNOSIS — Z86.73 HISTORY OF COMPLETED STROKE: Primary | ICD-10-CM

## 2018-08-28 NOTE — TELEPHONE ENCOUNTER
----- Message from IRIS Lopez sent at 8/28/2018  8:32 AM CDT -----  Contact: IRIS Lopez Dr,  I would like to request an order for a  resting hand splint for Ms Montana. This can be sent to [facility] or under referrals.   Thank you,  IRIS Lopez

## 2018-08-29 ENCOUNTER — CLINICAL SUPPORT (OUTPATIENT)
Dept: REHABILITATION | Facility: HOSPITAL | Age: 63
End: 2018-08-29
Attending: PHYSICAL MEDICINE & REHABILITATION
Payer: MEDICARE

## 2018-08-29 ENCOUNTER — DOCUMENTATION ONLY (OUTPATIENT)
Dept: REHABILITATION | Facility: HOSPITAL | Age: 63
End: 2018-08-29

## 2018-08-29 DIAGNOSIS — Z78.9 IMPAIRED ACTIVITIES OF DAILY LIVING: Primary | ICD-10-CM

## 2018-08-29 DIAGNOSIS — Z78.9 IMPAIRED MOTOR CONTROL: ICD-10-CM

## 2018-08-29 DIAGNOSIS — Z74.09 IMPAIRED FUNCTIONAL MOBILITY, BALANCE, GAIT, AND ENDURANCE: ICD-10-CM

## 2018-08-29 DIAGNOSIS — M25.612 DECREASED RANGE OF MOTION OF LEFT SHOULDER: ICD-10-CM

## 2018-08-29 DIAGNOSIS — M62.81 MUSCLE WEAKNESS OF LEFT UPPER EXTREMITY: ICD-10-CM

## 2018-08-29 PROCEDURE — 97530 THERAPEUTIC ACTIVITIES: CPT | Mod: PO | Performed by: OPTOMETRIST

## 2018-08-29 PROCEDURE — 97112 NEUROMUSCULAR REEDUCATION: CPT | Mod: PO

## 2018-08-29 NOTE — PROGRESS NOTES
"                                                      Physical Therapy Progress Note      Name: Lacey Montana  Clinic Number: [id number]  Diagnosis:        Encounter Diagnoses   Name Primary?    Impaired functional mobility, balance, gait, and endurance      Impaired motor control        Physician: Juan Pablo Anaya MD  Treatment Orders: PT Evaluation and Treatment       Past Medical History:   Diagnosis Date    Anticoagulant long-term use      Encounter for blood transfusion      GERD (gastroesophageal reflux disease)      Seizures      Stroke           Precautions: universal  Precautions: universal  Visit #: [id number]  Date of Eval: 5/01/18  Plan of Care Expiration: 10/2/18     Functional Limitations Reports - G Codes  Category: mobility   Tool: tinetti, TUG, SSWS  Score: 24/28, 13.8 sec with LBQC, 0.94 m/s with LBQC     G codes 5/10    eval CJ-CI   6/1/18 CJ-CI   7/6/18 CJ-CI   8/3/18 CJ-CI            Subjective   Pt reports: no new complaints    Pain Scale: no complaints     Objective     pt participated in neuromuscular re education x 45 minutes to address motor control and coordination to improve all mobility and balance:      EOM:  Not preformed today)  Restoration of arches of L hand  6 basic wrist stretches: 1. P-A glide of scaphoid on radius, 2. Radial deviation,               3. Increasing mobility of metacarpals, 4. Carpal roll, 5. Movement of forearm on    hand towards wrist extension, 6. Movement of forearm on the hand toward  supination/ pronation        Prone on forearms: rocking side to side left UE weight bearing and ventral body stretch                                   Quad stretch 3 x 30"                                    Left HSC 2 x 10 AAROM    Supine: Bridges 2 x 10                Left Uni bridges 2 x 10 Min A to satbilize               HSS 3 x 30"               SKC stretch 3 x 30"                Hip add stretch 3 x 30"                HIp IR/ER stretch 3 x 30"                PF " "stretch 3 x 30"      DF stretch with wedge with restoration of arches  BAPS board level 3, DF with Min A to control movements      // Bars tandem gait front, RUE support- 4 laps               Fwd gait with no UE support 4 laps     Backwards gait with RUE support, mod A for backwards (LLE placement)- 4 laps     Cybex Leg Press:              2 x 15 w/90# DL              3 x 10 w/70# SL L                      pregait without RUE support:   2 x 10 reps of R LE target taps to 4 inch step returning to midline after each to promote weight shifting with 1 UE support and CGA NP  2 x 10 reps of R LE target taps to 4 inch  steps w/o turning to midline with 1 UE support and CGA NP    3 x 10 reps of LLE single leg step up onto 6 inch step with 1 UE support and CGA np due to time  3 x 30" left SLS w/contralateral foot on 6" step right UE support  2 x 10 reps right foot placement on 6" step intermittent UE suppport VC fro weight shifts    Gait with SBQC 50" x 2  Close SBA VC for increased weight shifts to left stance limb           Written Home Exercises:   Pt demo good understanding of the education provided including: bridges with ball squeeze, internal rotation hip stretch and PPT. Lacey demonstrated good return demonstration of activities.      Education provided re: POC, HEP  No spiritual or educational barriers to learning provided     Pt has no cultural, educational or language barriers to learning provided.     Assessment   Lacey continued to tolerate treatment well.       Pt can benefit from continued skilled PT to address impairments to improve quality of mobility to improve balance, decrease fall risk, and decrease possibility of secondary impairments (i.e. OA).       Anticipated barriers to progress:length of time since onset of impairments, seizures, recent medication changes, recent onset of dizziness     Impairment List:  Fall Risk - impaired balance   Weakness   Impaired motor control  Decreased ROM  Gait " deviations   Decreased ambulation   Decreased activity tolerance   Difficulty participating in daily activities   Difficulty in participating in role as caregiver    Requires skilled supervision to complete and progress HEP   Requires skilled intervention and recommendations for AD/ orthotics     GOALS:    Status as of 8/3/18  Short term goals: 3-4 weeks, pt agrees to goals set.  1. Pt will perform HEP for basic strengthening and ROM with supervision to improve carryover of progress. Ongoing- pt reports difficulty with maintaining L knee flexion during supine exercises  2. Pt will demonstrate improved L DF to 4 degrees to improve foot clearance to decreased LLE ER during gait. Met 6/1/18-   3. Pt will demonstrate improved gait and mobility by demonstrating TUG with SBQC in 15 sec. Met 6/1/18- 14.6 sec with SBQC  4. Pt will demonstrate improved motor control and use of LLE for mobility by performing 5 times sit<>stand test in 10 sec with UE use as needed. Met 7/6/18- 9.6 sec with UE support  5. Pt will demonstrate improved mobility/ gait and decreased fall risk from high to moderate by scoring 19/28 on Tinetti Assessment. Met 6/1/18- 24/28  6. Make appropriate recommendations for DME as needed. Met 6/1/18- pt provided with list of DME vendors, has prescription for RW/ or quad cane     Long term goals: 6 weeks, pt agrees to goals set  7. Pt will demonstrate improved mobility and decreased fall risk by performing TUG with appropriate AD in 12 sec. improved- 13.8 sec avg with LBQC and L AFO  8. Pt will demonstrate improved L DF to 10 degrees to demonstrate improved resting position/ mobility of foot/ ankle and improve foot clearance during gait. Met 8/3/18- 12 deg AAROM  9. Pt will demonstrate improved mobility/ gait by scoring 22/28 on Tinetti Assessment. Met 6/1/18- 24/28  10. Pt will ambulate with appropriate AD for 150 ft without significant ER of LLE to demonstrate improved gait quality and reduce abnormal motions  which may cause a secondary impairment of LBP. Improving- pt demonstrates improved control of LLE ER during gait, but cannot consistently maintain  11. Pt will demonstrate improved gait velocity to 1.14 m/s with appropraite AD to demonstrate a 25% improvement in gait. Improved- 0.94 m/s but improved quality noted    Plan   Continue PT 2x weekly under established Plan of Care, with treatment to include: pt education, HEP, therapeutic exercises, neuromuscular re-education/balance exercises, therapeutic activities, joint mobilizations, and modalities PRN, to work towards established goals. Pt may be seen by PTA to carry out plan of care.      Shahram Louis, PTA  8/29/2018

## 2018-08-29 NOTE — PROGRESS NOTES
Occupational Therapy Daily Treatment Note     Name: Lacey Montana   Clinic Number: [id number]    Therapy Diagnosis:   Encounter Diagnoses   Name Primary?    Decreased range of motion of left shoulder     Impaired activities of daily living Yes    Muscle weakness of left upper extremity      Physician: Jua nPablo Anaya MD    Visit Date: 8/29/2018  Physician Orders: Evaluate and treat  Medical Diagnosis: CVA  Evaluation Date: 7/20/2018  Insurance Authorization Period Expiration: 12/31/2018  Plan of Care Certification Period: through 8/31/2018  Date of Return to MD: 8/20/2018    Visit # / Visits authorized: 11 / 20  Time In: 0900  Time Out: 0945   Total Billable Time: 45 minutes    Precautions:  Standard    Subjective     Pt reports: Pt was able to mark the GG paddle that she has on loan to try but needed additional instruction.   Response to previous treatment: Improvement in passive and active shoulder movements    Functional change: progressing towards goals     Pain: none reported   Location: NA      Objective     Lacey participated in dynamic functional therapeutic activities to improve functional performance for 45 minutes, including:  Pt was seated at EOM after PT session:   Continued from EOM:   - Mobilization of palm of L hand for stretching and metacarpal flexion and extension    - General wrist stretches including 1. Scaphoid on radius 2. Increasing mobility of metacarpals 3. Carpal rolls 4. Increasing mobility towards radial deviation 5. Increasing mobility of wrist towards extension 6. Increasing mobility of wrist towards supination/pronation  - Placement of L hand on hard surface and given cues to lean forward to promote extension of wrist  - GG paddle splint donned and trouble shooting of donning was addressed  - Scapular mobilization for elevation, depression, adduction and protraction where AAROM was performed to L side.  - Assisted shoulder flexion from sitting at EOM combined with  reaching for targets in various planes      After return to sitting EOM:   -GG paddle splint removed and hand mobilized for palm stretching and metacarpal movements and donning reviewed with Pt  - Pt assessed for digits extension where she was able to demonstrate Trace movements.     Pt continues to use therapist's GG paddle splint to borrow this weekend to see if she would be interested in purchasing one.       Home Exercises and Education Provided     Education provided: POC, HEP   - Review of standing WB into open L hand on counter and emphasis on limiting grasping objects or holding on with her L hand.   - Progress towards goals - Goals remain appropriate      Written Home Exercises Provided: no new, continue current HEP.  Exercises were reviewed and Lacey was able to demonstrate them prior to the end of the session.  Lacey demonstrated good  understanding of the education provided.          Assessment   Patient had some difficulty with applying the GG paddle splint on her own at home so donning was reviewed. She stated that she was only able to wear it for a limited time but expressed it's benefits. She is always motivated to try new activities to help her to improve her L UE for any functional movement.  Pt is diligent at attending sessions and takes a cab to and from therapy. She is highly motivated to participate and improve and stated that she performs her HEP.  She is determined not to miss a session.     Lacey is progressing well towards her goals and there are no updates to goals at this time. Pt prognosis is Good.     Pt will continue to benefit from skilled outpatient occupational therapy to address the deficits listed in the problem list on initial evaluation provide pt/family education and to maximize pt's level of independence in the home and community environment.     Anticipated barriers to occupational therapy: None at this time    Pt's spiritual, cultural and educational needs  considered and pt agreeable to plan of care and goals.    Goals:    LTG GOALS:  Time frame: 6 weeks  Increase ROM/Strength to perform AL's and functional activities (specify)  Pt will increase L shoulder flexion AAROM to 100* as needed to improve dressing tasks incorporating L UE   Pt will increase L elbow flexion AAROM to 120* as needed to improve dressing task incorporating L UE   LB dressing will improve to Mod I     STG Goals:  Time frame: 3 weeks  Increase ROM/Strength to perform ADL's and functional activities (specify)  Pt will increase L shoulder flexion AROM to 90* as needed to improve dressing tasks incorporating L UE   Pt will increase L elbow flexion AROM to 100* as needed to improve dressing task incorporating L UE   Pt will improve  in L hand to register on Dynamometer in order to improve functional use of L hand   Pt will be independent with tying shoes using one-hand technique.   Pt will be independent with HEP to improve ROM to L UE.     Plan   Patient/caregiver understands and agrees with plan of care.  Outpatient occupational therapy 2  times weekly for 6 weeks  to include: pt ed, hep, therapeutic exercises, therapeutic activity, ADLs,  neuromuscular re-education, joint mobilizations, modalities prn,      Discussed Plan of Care with patient: Yes  Updates/Grading for next session: Continue increase in ROM to shoulder and hand and issue new HEPs.       IRIS Lopez   8/29/2018

## 2018-08-29 NOTE — PROGRESS NOTES
Face to face meeting completed with Monica Mosley  PT regarding current status and progress of   Lacey Montana .  Shahram Louis, PTA

## 2018-09-07 ENCOUNTER — CLINICAL SUPPORT (OUTPATIENT)
Dept: REHABILITATION | Facility: HOSPITAL | Age: 63
End: 2018-09-07
Attending: PHYSICAL MEDICINE & REHABILITATION
Payer: MEDICARE

## 2018-09-07 DIAGNOSIS — M25.612 DECREASED RANGE OF MOTION OF LEFT SHOULDER: Primary | ICD-10-CM

## 2018-09-07 DIAGNOSIS — Z74.09 IMPAIRED FUNCTIONAL MOBILITY, BALANCE, GAIT, AND ENDURANCE: ICD-10-CM

## 2018-09-07 DIAGNOSIS — M62.81 MUSCLE WEAKNESS OF LEFT UPPER EXTREMITY: ICD-10-CM

## 2018-09-07 DIAGNOSIS — Z78.9 IMPAIRED MOTOR CONTROL: ICD-10-CM

## 2018-09-07 DIAGNOSIS — Z78.9 IMPAIRED ACTIVITIES OF DAILY LIVING: ICD-10-CM

## 2018-09-07 PROCEDURE — G8978 MOBILITY CURRENT STATUS: HCPCS | Mod: CI,PO | Performed by: PHYSICAL THERAPIST

## 2018-09-07 PROCEDURE — G8979 MOBILITY GOAL STATUS: HCPCS | Mod: CI,PO | Performed by: PHYSICAL THERAPIST

## 2018-09-07 PROCEDURE — 97112 NEUROMUSCULAR REEDUCATION: CPT | Mod: KX,PO | Performed by: PHYSICAL THERAPIST

## 2018-09-07 PROCEDURE — 97530 THERAPEUTIC ACTIVITIES: CPT | Mod: PO | Performed by: OPTOMETRIST

## 2018-09-07 NOTE — PLAN OF CARE
"                                                      Physical Therapy Progress Note      Name: Lacey Montana  Clinic Number: [id number]  Diagnosis:        Encounter Diagnoses   Name Primary?    Impaired functional mobility, balance, gait, and endurance      Impaired motor control        Physician: Juan Pablo Anaya MD  Treatment Orders: PT Evaluation and Treatment       Past Medical History:   Diagnosis Date    Anticoagulant long-term use      Encounter for blood transfusion      GERD (gastroesophageal reflux disease)      Seizures      Stroke           Precautions: universal  Precautions: universal  Visit #: [id number]  Date of Eval: 5/01/18  Plan of Care Expiration: 10/2/18     Functional Limitations Reports - G Codes  Category: mobility   Tool: tinetti, TUG, SSWS  Score: 24/28, 12.2 sec with LBQC, 0.92 m/s with LBQC      G codes 6/10    eval CJ-CI   6/1/18 CJ-CI   7/6/18 CJ-CI   8/3/18 CJ-CI   9/7/2018 CI-CI            Subjective   Pt reports: no new complaints    Pain Scale: no complaints     Objective     pt participated in neuromuscular re education x 39 minutes to address motor control and coordination to improve all mobility and balance:   TUG with SPC and L AFO= 12.2 sec  SSWS with SPC and L AFO= 6m in 6.5 sec= 0.92 m/s    Restoration of arches of L hand  6 basic wrist stretches: 1. P-A glide of scaphoid on radius, 2. Radial deviation,    3. Increasing mobility of metacarpals, 4. Carpal roll, 5. Movement of forearm on  hand towards wrist extension, 6. Movement of forearm on the hand toward  supination/ pronation  Romeor gait splint applied to L hand    Stretching of L hip IR with L trunk rotation and R lateral pelvic tilt  Step tap with SPC R up on 8" step, min A for balance/ knee stability- 10x    Pre gait without UE support: forward step with RLE- fair to poor+ balance   R Side step with fair- balance   R Forward>side step with poor+ to fair- balance    Sit<>stand with I bar, focus on increasing " "weight on LLE- 10x, min A with push posterior thigh       NP today:   EOM:  Prone on forearms: rocking side to side left UE weight bearing and ventral body stretch                                   Quad stretch 3 x 30"                                    Left HSC 2 x 10 AAROM  Supine: Bridges 2 x 10                Left Uni bridges 2 x 10 Min A to satbilize               HSS 3 x 30"               SKC stretch 3 x 30"                Hip add stretch 3 x 30"                HIp IR/ER stretch 3 x 30"                PF stretch 3 x 30"    DF stretch with wedge with restoration of arches  BAPS board level 3, DF with Min A to control movements  // Bars tandem gait front, RUE support- 4 laps               Fwd gait with no UE support 4 laps     Backwards gait with RUE support, mod A for backwards (LLE placement)- 4 laps   Cybex Leg Press:              2 x 15 w/90# DL              3 x 10 w/70# SL L        pregait without RUE support:   2 x 10 reps of R LE target taps to 4 inch step returning to midline after each to promote weight shifting with 1 UE support and CGA NP  2 x 10 reps of R LE target taps to 4 inch  steps w/o turning to midline with 1 UE support and CGA NP    3 x 10 reps of LLE single leg step up onto 6 inch step with 1 UE support and CGA np due to time  3 x 30" left SLS w/contralateral foot on 6" step right UE support  2 x 10 reps right foot placement on 6" step intermittent UE suppport VC fro weight shifts         Written Home Exercises:   Pt demo good understanding of the education provided including: bridges with ball squeeze, internal rotation hip stretch and PPT. Lacey demonstrated good return demonstration of activities.      Education provided re: POC, HEP  No spiritual or educational barriers to learning provided     Pt has no cultural, educational or language barriers to learning provided.     Assessment   assessment period: 8/6/18 to 9/7/18. Lacey tolerates treatment sessions well and is " demonstrating improved general speed of mobility. Pt recently obtained a SPC for use with gait. Assessments today performed with SPC vs quad cane (used for previous assessments). Pt is demonstrating improved control of LLE ER during initial contact, L hip ER noted in terminal stance. Pt has difficulty balancing in stance with L foot in back. Pt is demonstrating improved support of body on RLE, PT continues to progress to address dynamic stability of limb.  Decreased stance time occurs on LLE. Pt continues to demonstrate increased use of RLE during all mobility. PT will conitue to address improving weight bearing of LLE and equal use of LE to correct gait deviations. Pt demonstrates a 19% (-3%)average level of impairment for mobility, pt is anticipated to improve impairment level to 15% by d/c.       Pt can benefit from continued skilled PT to address impairments to improve quality of mobility to improve balance, decrease fall risk, and decrease possibility of secondary impairments (i.e. OA).       Anticipated barriers to progress:length of time since onset of impairments, seizures, recent medication changes, recent onset of dizziness     Impairment List:  Fall Risk - impaired balance   Weakness   Impaired motor control   Decreased ROM  Gait deviations   Decreased ambulation   Decreased activity tolerance   Difficulty participating in daily activities   Difficulty in participating in role as caregiver    Requires skilled supervision to complete and progress HEP   Requires skilled intervention and recommendations for AD/ orthotics     GOALS:    Status as of 9/7/18  Short term goals: 3-4 weeks, pt agrees to goals set.  1. Pt will perform HEP for basic strengthening and ROM with supervision to improve carryover of progress. Ongoing- pt reports difficulty with maintaining L knee flexion during supine exercises  2. Pt will demonstrate improved L DF to 4 degrees to improve foot clearance to decreased LLE ER during gait.  Met 6/1/18-   3. Pt will demonstrate improved gait and mobility by demonstrating TUG with SBQC in 15 sec. Met 6/1/18  4. Pt will demonstrate improved motor control and use of LLE for mobility by performing 5 times sit<>stand test in 10 sec with UE use as needed. Met 7/6/18- 9.6 sec with UE support  5. Pt will demonstrate improved mobility/ gait and decreased fall risk from high to moderate by scoring 19/28 on Tinetti Assessment. Met 6/1/18- 24/28  6. Make appropriate recommendations for DME as needed. Met 6/1/18     Long term goals: 6 weeks, pt agrees to goals set  7. Pt will demonstrate improved mobility and decreased fall risk by performing TUG with appropriate AD in 12 sec. improved- 12.2 sec (-1.6 sec)avg with SPC and L AFO  8. Pt will demonstrate improved L DF to 10 degrees to demonstrate improved resting position/ mobility of foot/ ankle and improve foot clearance during gait. Met 8/3/18- 12 deg AAROM  9. Pt will demonstrate improved mobility/ gait by scoring 22/28 on Tinetti Assessment. Met 6/1/18- 24/28  10. Pt will ambulate with appropriate AD for 150 ft without significant ER of LLE to demonstrate improved gait quality and reduce abnormal motions which may cause a secondary impairment of LBP. Improving- pt demonstrates improved control of LLE ER during gait, does not completely follow through in stance  11. Pt will demonstrate improved gait velocity to 1.14 m/s with appropraite AD to demonstrate a 25% improvement in gait. same- 0.92m/s with SPC but decrease AD used     Plan   Continue PT 2x weekly under established Plan of Care, with treatment to include: pt education, HEP, therapeutic exercises, neuromuscular re-education/balance exercises, therapeutic activities, joint mobilizations, and modalities PRN, to work towards established goals. Pt may be seen by PTA to carry out plan of care.      Monica Mosley, PT  9/7/2018

## 2018-09-07 NOTE — PROGRESS NOTES
"  Occupational Therapy Daily Treatment Note     Name: Lacey Montana   Clinic Number: [id number]    Therapy Diagnosis:   Encounter Diagnoses   Name Primary?    Decreased range of motion of left shoulder Yes    Impaired activities of daily living     Muscle weakness of left upper extremity      Physician: Juan Pablo Anaya MD    Visit Date: 9/7/2018  Physician Orders: Evaluate and treat  Medical Diagnosis: CVA  Evaluation Date: 7/20/2018  Insurance Authorization Period Expiration: 12/31/2018  Plan of Care Certification Period: through 8/31/2018  Date of Return to MD: 8/20/2018    Visit # / Visits authorized: 12 / 20  Time In: 0900  Time Out: 0945   Total Billable Time: 45 minutes    Precautions:  Standard    Subjective     Pt reports: Pt received her resting hand splint from [facility] yesterday and brought it in today.   Response to previous treatment: Gain in passive and active shoulder movements    Functional change: progressing towards goals     Pain: none reported   Location: NA      Objective     Lacey participated in dynamic functional therapeutic activities to improve functional performance for 45 minutes, including:  Pt was seated at EOM after PT session and was wearing GG paddle splint:  Continued from EOM:   - Scapular mobilization for elevation, depression, adduction and protraction where AAROM was performed to L side.  - Assisted shoulder flexion from sitting at EOM combined with reaching for targets in various planes  - Supine assisted shoulder flexion combined with slowly controlled elbow extension and flexion.  - Continued from supine with L arm toward ceiling with paddle splint donned, assisted movement of L UE toward "targets" set by therapist  Pt returned to EOM and GG paddle splint removed  - Mobilization of palm of L hand for stretching and metacarpal flexion and extension    - General wrist stretches including 1. Scaphoid on radius 2. Increasing mobility of metacarpals 3. Carpal rolls " 4. Increasing mobility towards radial deviation 5. Increasing mobility of wrist towards extension 6. Increasing mobility of wrist towards supination/pronation  - AAROM for wrist movements and Trace movement at digits for extension  - Pt's new resting hand splint was donned and adjusted for fit as she was also trained in proper donning, wear schedule and care.     Pt continues to use therapist's GG paddle splint to borrow this weekend to see if she would be interested in purchasing one.       Home Exercises and Education Provided     Education provided: POC, HEP, donning, doffing and wear shedule of both resting hand splint and GG paddle splint   - Review of standing WB into open L hand on counter and emphasis on limiting grasping objects or holding on with her L hand.   - Progress towards goals - Goals remain appropriate      Written Home Exercises Provided: no new, continue current HEP.  Exercises were reviewed and Lacey was able to demonstrate them prior to the end of the session.  Lacey demonstrated good  understanding of the education provided.          Assessment   Ms Montana was happy to receive her resting hand splint that was on order for a few weeks. She did well with leaning how it is used and for her wear schedule as she is always motivated to try new activities to help her to improve her L UE for any functional movement.  Pt is diligent at attending sessions and takes a cab to and from therapy. She is highly motivated to participate and improve and stated that she performs her HEP.  She is determined not to miss a session.     Lacey is progressing well towards her goals and there are no updates to goals at this time. Pt prognosis is Good.     Pt will continue to benefit from skilled outpatient occupational therapy to address the deficits listed in the problem list on initial evaluation provide pt/family education and to maximize pt's level of independence in the home and community  environment.     Anticipated barriers to occupational therapy: None at this time    Pt's spiritual, cultural and educational needs considered and pt agreeable to plan of care and goals.    Goals:    LTG GOALS:  Time frame: 6 weeks  Increase ROM/Strength to perform AL's and functional activities (specify)  Pt will increase L shoulder flexion AAROM to 100* as needed to improve dressing tasks incorporating L UE   Pt will increase L elbow flexion AAROM to 120* as needed to improve dressing task incorporating L UE   LB dressing will improve to Mod I     STG Goals:  Time frame: 3 weeks  Increase ROM/Strength to perform ADL's and functional activities (specify)  Pt will increase L shoulder flexion AROM to 90* as needed to improve dressing tasks incorporating L UE   Pt will increase L elbow flexion AROM to 100* as needed to improve dressing task incorporating L UE   Pt will improve  in L hand to register on Dynamometer in order to improve functional use of L hand   Pt will be independent with tying shoes using one-hand technique.   Pt will be independent with HEP to improve ROM to L UE.     Plan   Patient/caregiver understands and agrees with plan of care.  Outpatient occupational therapy 2  times weekly for 6 weeks  to include: pt ed, hep, therapeutic exercises, therapeutic activity, ADLs,  neuromuscular re-education, joint mobilizations, modalities prn,      Discussed Plan of Care with patient: Yes  Updates/Grading for next session: Continue increase in ROM to shoulder and hand and issue new HEPs.       IRIS Lopez   9/7/2018

## 2018-09-11 ENCOUNTER — PES CALL (OUTPATIENT)
Dept: ADMINISTRATIVE | Facility: CLINIC | Age: 63
End: 2018-09-11

## 2018-09-11 ENCOUNTER — CLINICAL SUPPORT (OUTPATIENT)
Dept: REHABILITATION | Facility: HOSPITAL | Age: 63
End: 2018-09-11
Attending: PHYSICAL MEDICINE & REHABILITATION
Payer: MEDICARE

## 2018-09-11 DIAGNOSIS — Z78.9 IMPAIRED MOTOR CONTROL: ICD-10-CM

## 2018-09-11 DIAGNOSIS — Z74.09 IMPAIRED FUNCTIONAL MOBILITY, BALANCE, GAIT, AND ENDURANCE: ICD-10-CM

## 2018-09-11 PROCEDURE — 97112 NEUROMUSCULAR REEDUCATION: CPT | Mod: PO

## 2018-09-11 NOTE — PROGRESS NOTES
"                                                      Physical Therapy Progress Note      Name: Lacey Montana  Clinic Number: [id number]  Diagnosis:        Encounter Diagnoses   Name Primary?    Impaired functional mobility, balance, gait, and endurance      Impaired motor control        Physician: Juan Pablo Anaya MD  Treatment Orders: PT Evaluation and Treatment       Past Medical History:   Diagnosis Date    Anticoagulant long-term use      Encounter for blood transfusion      GERD (gastroesophageal reflux disease)      Seizures      Stroke           Precautions: universal  Precautions: universal  Visit #: [id number]  Date of Eval: 5/01/18  Plan of Care Expiration: 10/2/18     Functional Limitations Reports - G Codes  Category: mobility   Tool: tinetti, TUG, SSWS  Score: 24/28, 12.2 sec with LBQC, 0.92 m/s with LBQC     G codes 7/10    eval CJ-CI   6/1/18 CJ-CI   7/6/18 CJ-CI   8/3/18 CJ-CI            Subjective   Pt reports: no new complaints    Pain Scale: no complaints     Objective     pt participated in neuromuscular re education x 45 minutes to address motor control and coordination to improve all mobility and balance:     EOM:  Restoration of arches of L hand  6 basic wrist stretches: 1. P-A glide of scaphoid on radius, 2. Radial deviation,               3. Increasing mobility of metacarpals, 4. Carpal roll, 5. Movement of forearm on    hand towards wrist extension, 6. Movement of forearm on the hand toward  supination/ pronation    // bars:  Step tap with SPC R up on 8" step, min A for balance/ knee stability- 10x    Pre gait with 1 UE support:    forward step with RLE- good balance              forward step with LLE - good balance     Sit to stands with an emphasis on equal weight distribution                  Stairs:  Ascending 2 flights of stairs 10 steps and 17 steps with step to gait pattern leading with RLE, 1 UE support and CGA, pre tx   Descending 2 flights of stairs 10 steps and 17 steps " "with step to gait pattern leading with LLE, 1 UE support CGA, post tx    Gait with ' x 2  Close SBA VC for increased weight shifts to left stance limb    Not performed today:  Prone on forearms: rocking side to side left UE weight bearing and ventral body stretch                                   Quad stretch 3 x 30"                                    Left HSC 2 x 10 AAROM    Supine: Bridges 2 x 10                Left Uni bridges 2 x 10 Min A to satbilize               HSS 3 x 30"               SKC stretch 3 x 30"                Hip add stretch 3 x 30"                HIp IR/ER stretch 3 x 30"                PF stretch 3 x 30"      DF stretch with wedge with restoration of arches  BAPS board level 3, DF with Min A to control movements      // Bars tandem gait front, RUE support- 4 laps               Fwd gait with no UE support 4 laps     Backwards gait with RUE support, mod A for backwards (LLE placement)- 4 laps     Cybex Leg Press:              2 x 15 w/90# DL              3 x 10 w/70# SL L                   pregait without RUE support:   2 x 10 reps of R LE target taps to 4 inch step returning to midline after each to promote weight shifting with 1 UE support and CGA NP  2 x 10 reps of R LE target taps to 4 inch  steps w/o turning to midline with 1 UE support and CGA NP    3 x 10 reps of LLE single leg step up onto 6 inch step with 1 UE support and CGA np due to time  3 x 30" left SLS w/contralateral foot on 6" step right UE support  2 x 10 reps right foot placement on 6" step intermittent UE suppport VC fro weight shifts    Written Home Exercises:   Pt demo good understanding of the education provided including: bridges with ball squeeze, internal rotation hip stretch and PPT. Lacey demonstrated good return demonstration of activities.      Education provided re: POC, HEP  No spiritual or educational barriers to learning provided     Pt has no cultural, educational or language barriers to learning " provided.     Assessment     Lacey ascended and descended clinic stairs due to a broken elevator. Pt able to negotiate stairs with only CGA from PTA but requires LLE circumduction in order to clear each step due to limited knee flexion. Pt also continues to demonstrate LLE external hip rotation in terminal stance.    Pt can benefit from continued skilled PT to address impairments to improve quality of mobility to improve balance, decrease fall risk, and decrease possibility of secondary impairments (i.e. OA).       Anticipated barriers to progress:length of time since onset of impairments, seizures, recent medication changes, recent onset of dizziness     Impairment List:  Fall Risk - impaired balance   Weakness   Impaired motor control  Decreased ROM  Gait deviations   Decreased ambulation   Decreased activity tolerance   Difficulty participating in daily activities   Difficulty in participating in role as caregiver    Requires skilled supervision to complete and progress HEP   Requires skilled intervention and recommendations for AD/ orthotics     GOALS:    Status as of 8/3/18  Short term goals: 3-4 weeks, pt agrees to goals set.  1. Pt will perform HEP for basic strengthening and ROM with supervision to improve carryover of progress. Ongoing- pt reports difficulty with maintaining L knee flexion during supine exercises  2. Pt will demonstrate improved L DF to 4 degrees to improve foot clearance to decreased LLE ER during gait. Met 6/1/18-   3. Pt will demonstrate improved gait and mobility by demonstrating TUG with SBQC in 15 sec. Met 6/1/18- 14.6 sec with SBQC  4. Pt will demonstrate improved motor control and use of LLE for mobility by performing 5 times sit<>stand test in 10 sec with UE use as needed. Met 7/6/18- 9.6 sec with UE support  5. Pt will demonstrate improved mobility/ gait and decreased fall risk from high to moderate by scoring 19/28 on Tinetti Assessment. Met 6/1/18- 24/28  6. Make appropriate  recommendations for DME as needed. Met 6/1/18- pt provided with list of DME vendors, has prescription for RW/ or quad cane     Long term goals: 6 weeks, pt agrees to goals set  7. Pt will demonstrate improved mobility and decreased fall risk by performing TUG with appropriate AD in 12 sec. improved- 13.8 sec avg with LBQC and L AFO  8. Pt will demonstrate improved L DF to 10 degrees to demonstrate improved resting position/ mobility of foot/ ankle and improve foot clearance during gait. Met 8/3/18- 12 deg AAROM  9. Pt will demonstrate improved mobility/ gait by scoring 22/28 on Tinetti Assessment. Met 6/1/18- 24/28  10. Pt will ambulate with appropriate AD for 150 ft without significant ER of LLE to demonstrate improved gait quality and reduce abnormal motions which may cause a secondary impairment of LBP. Improving- pt demonstrates improved control of LLE ER during gait, but cannot consistently maintain  11. Pt will demonstrate improved gait velocity to 1.14 m/s with appropraite AD to demonstrate a 25% improvement in gait. Improved- 0.94 m/s but improved quality noted    Plan   Continue PT 2x weekly under established Plan of Care, with treatment to include: pt education, HEP, therapeutic exercises, neuromuscular re-education/balance exercises, therapeutic activities, joint mobilizations, and modalities PRN, to work towards established goals. Pt may be seen by PTA to carry out plan of care.      Nic Weiss, PTA  9/11/2018

## 2018-09-14 ENCOUNTER — CLINICAL SUPPORT (OUTPATIENT)
Dept: REHABILITATION | Facility: HOSPITAL | Age: 63
End: 2018-09-14
Attending: PHYSICAL MEDICINE & REHABILITATION
Payer: MEDICARE

## 2018-09-14 DIAGNOSIS — Z78.9 IMPAIRED MOTOR CONTROL: ICD-10-CM

## 2018-09-14 DIAGNOSIS — Z74.09 IMPAIRED FUNCTIONAL MOBILITY, BALANCE, GAIT, AND ENDURANCE: ICD-10-CM

## 2018-09-14 DIAGNOSIS — Z78.9 IMPAIRED ACTIVITIES OF DAILY LIVING: ICD-10-CM

## 2018-09-14 DIAGNOSIS — M62.81 MUSCLE WEAKNESS OF LEFT UPPER EXTREMITY: ICD-10-CM

## 2018-09-14 DIAGNOSIS — M25.612 DECREASED RANGE OF MOTION OF LEFT SHOULDER: Primary | ICD-10-CM

## 2018-09-14 PROCEDURE — 97112 NEUROMUSCULAR REEDUCATION: CPT | Mod: KX,PO | Performed by: PHYSICAL THERAPIST

## 2018-09-14 PROCEDURE — 97530 THERAPEUTIC ACTIVITIES: CPT | Mod: PO | Performed by: OPTOMETRIST

## 2018-09-14 NOTE — PROGRESS NOTES
Occupational Therapy Daily Treatment Note     Name: Lacey Montana   Clinic Number: [id number]    Therapy Diagnosis:   Encounter Diagnoses   Name Primary?    Decreased range of motion of left shoulder Yes    Impaired activities of daily living     Muscle weakness of left upper extremity      Physician: Juan Pablo Anaya MD    Visit Date: 9/14/2018  Physician Orders: Evaluate and treat  Medical Diagnosis: CVA  Evaluation Date: 7/20/2018  Insurance Authorization Period Expiration: 12/31/2018  Plan of Care Certification Period: through 8/31/2018  Date of Return to MD: 8/20/2018    Visit # / Visits authorized: 13 / 20  Time In: 0815  Time Out: 0900   Total Billable Time: 45 minutes    Precautions:  Standard    Subjective     Pt reports: Pt stated that her thumb has been more painful since using the splints.   Response to previous treatment: Pt feels that her digits are more relaxed with her resting hand splint    Functional change: progressing towards goals     Pain: none reported at time of visit but her L thumb has been painful  Location: NA      Objective     Lacey participated in dynamic functional therapeutic activities to improve functional performance for 45 minutes, including:  Pt ambulated to session with her rolling basket and was seated at EOM. She had both her resting hand splint and the loaner GG paddle splint with her.  - Mobilization of palm of L hand for stretching and metacarpal flexion and extension    - General wrist stretches including 1. Scaphoid on radius 2. Increasing mobility of metacarpals 3. Carpal rolls 4. Increasing mobility towards radial deviation 5. Increasing mobility of wrist towards extension 6. Increasing mobility of wrist towards supination/pronation  - Application of GG paddle splint with thumb applied with less abduction in the splint  - Scapular mobilization for elevation, depression, adduction and protraction where AAROM was performed to L side.  - Assisted shoulder  flexion from sitting at EOM combined with reaching for targets in various planes  - Pt was taken to counter for standing countertop glides with GG paddle splint applied.     Pt was returned to sitting at EOM and adjustment to GG paddle splint at the thumb was addressed where Pt felt that this position was more comfortable and stated that she will adjust when wearing it at home. She was not having difficulty with her resting hand splint with use at home.   She was left sitting EOM with GG paddle splint applied for PT session following.    Pt continues to use therapist's GG paddle splint to see if she would be interested in purchasing one.     Home Exercises and Education Provided     Education provided: POC, HEP, donning, doffing and wear schedule of both resting hand splint and GG paddle splint   - Review of standing WB into open L hand on counter and emphasis on limiting grasping objects or holding on with her L hand.   - Progress towards goals - Goals remain appropriate      Written Home Exercises Provided: no new, continue current HEP.  Exercises were reviewed and Lacey was able to demonstrate them prior to the end of the session.  Lacey demonstrated good  understanding of the education provided.          Assessment   Ms Montana did not have pain in her L thumb at the time of her session but did have pain at home. Alterations in thumb position in her resting hand splint and the GG paddle splint were made for her to try.  She states that she is following her wear schedule as she is always motivated to try new activities to help her to improve her L UE for any functional movement.  Pt is diligent at attending sessions . She is highly motivated to participate and improve and stated that she performs her HEP.  She is determined not to miss a session.     Lacey is progressing well towards her goals and there are no updates to goals at this time. Pt prognosis is Good.     Pt will continue to benefit from  skilled outpatient occupational therapy to address the deficits listed in the problem list on initial evaluation provide pt/family education and to maximize pt's level of independence in the home and community environment.     Anticipated barriers to occupational therapy: None at this time    Pt's spiritual, cultural and educational needs considered and pt agreeable to plan of care and goals.    Goals:    LTG GOALS:  Time frame: 6 weeks  Increase ROM/Strength to perform AL's and functional activities (specify)  Pt will increase L shoulder flexion AAROM to 100* as needed to improve dressing tasks incorporating L UE   Pt will increase L elbow flexion AAROM to 120* as needed to improve dressing task incorporating L UE   LB dressing will improve to Mod I     STG Goals:  Time frame: 3 weeks  Increase ROM/Strength to perform ADL's and functional activities (specify)  Pt will increase L shoulder flexion AROM to 90* as needed to improve dressing tasks incorporating L UE   Pt will increase L elbow flexion AROM to 100* as needed to improve dressing task incorporating L UE   Pt will improve  in L hand to register on [employer] in order to improve functional use of L hand   Pt will be independent with tying shoes using one-hand technique.   Pt will be independent with HEP to improve ROM to L UE.     Plan   Patient/caregiver understands and agrees with plan of care.  Outpatient occupational therapy 2  times weekly for 6 weeks  to include: pt ed, hep, therapeutic exercises, therapeutic activity, ADLs,  neuromuscular re-education, joint mobilizations, modalities prn,      Discussed Plan of Care with patient: Yes  Updates/Grading for next session: Continue increase in ROM to shoulder and hand and issue new HEPs.       IRIS Lopez   9/14/2018

## 2018-09-14 NOTE — PROGRESS NOTES
"                                                      Physical Therapy Progress Note      Name: Lacey Montana  Clinic Number: [id number]  Diagnosis:        Encounter Diagnoses   Name Primary?    Impaired functional mobility, balance, gait, and endurance      Impaired motor control        Physician: Juan Pablo Anaya MD  Treatment Orders: PT Evaluation and Treatment       Past Medical History:   Diagnosis Date    Anticoagulant long-term use      Encounter for blood transfusion      GERD (gastroesophageal reflux disease)      Seizures      Stroke           Precautions: universal  Precautions: universal  Visit #: [id number]  Date of Eval: 5/01/18  Plan of Care Expiration: 10/2/18     Functional Limitations Reports - G Codes  Category: mobility   Tool: tinetti, TUG, SSWS  Score: 24/28, 12.2 sec with LBQC, 0.92 m/s with LBQC     G codes 2/10    eval CJ-CI   6/1/18 CJ-CI   7/6/18 CJ-CI   8/3/18 CJ-CI            Subjective   Pt reports: no new complaints    Pain Scale: no complaints     Objective   Pt had jean gate splint on LUE prior to PT session    pt participated in neuromuscular re education x 42 minutes to address motor control and coordination to improve all mobility and balance:     DF stretch with wedge with restoration of arches  L hip IR stretch with trunk rotation    Supine: L 1/2 bridge 10x   LTR 10x    Step tap without UE support R up on 8" step, min A for balance/ knee stability- 10x    Pre gait with 1 UE support:    forward step with RLE- good to fair balance              side step with RLE - good balance     Stand<> tall kneeling EOM with ball- mod A  Tall kneeling with BUE support on ball- poor+ balance   RUE lift off of ball- poor+ balance    Sit to stands with I bar, an emphasis on equal weight distribution- 10x, min A               Stairs:  Ascending 2 flights of stairs 10 steps and 17 steps with step to gait pattern leading with RLE, 1 UE support and CGA, pre tx   Descending 2 flights of " "stairs 10 steps and 17 steps with step to gait pattern leading with LLE, 1 UE support CGA, post tx    Gait with ' x 2  Close SBA VC for increased weight shifts to left stance limb  Prone on forearms: rocking side to side left UE weight bearing and ventral body stretch                                   Quad stretch 3 x 30"                                    Left HSC 2 x 10 AAROM  Supine: Bridges 2 x 10                HSS 3 x 30"               SKC stretch 3 x 30"                Hip add stretch 3 x 30"                HIp IR/ER stretch 3 x 30"                PF stretch 3 x 30"    BAPS board level 3, DF with Min A to control movements  // Bars tandem gait front, RUE support- 4 laps               Fwd gait with no UE support 4 laps     Backwards gait with RUE support, mod A for backwards (LLE placement)- 4 laps   Cybex Leg Press:              2 x 15 w/90# DL              3 x 10 w/70# SL L                pregait without RUE support:   2 x 10 reps of R LE target taps to 4 inch step returning to midline after each to promote weight shifting with 1 UE support and CGA NP  2 x 10 reps of R LE target taps to 4 inch  steps w/o turning to midline with 1 UE support and CGA NP      Written Home Exercises:   Pt demo good understanding of the education provided including: bridges with ball squeeze, internal rotation hip stretch and PPT. Lacey demonstrated good return demonstration of activities.      Education provided re: POC, HEP  No spiritual or educational barriers to learning provided     Pt has no cultural, educational or language barriers to learning provided.     Assessment     Lacey tolerated treatment well. Pt was able to tolerate tall kneeling with UE support, but pt demonstrated increased extensor tone in both LUE and LE. Pt required at least minimal assistance to maintain tall kneeling due to decreased strength and motor control. Pt demonstrated improved ability to support body with RLE with assistance for " weight shift and balance. Increased tone, decreased ROM, and decreased motor control continue to impair progress with mobility. Skin integrity of L hand was intact after removal of splint, no adverse reactions noted.       Pt can benefit from continued skilled PT to address impairments to improve quality of mobility to improve balance, decrease fall risk, and decrease possibility of secondary impairments (i.e. OA).       Anticipated barriers to progress:length of time since onset of impairments, seizures, recent medication changes, recent onset of dizziness     Impairment List:  Fall Risk - impaired balance   Weakness   Impaired motor control  Decreased ROM  Gait deviations   Decreased ambulation   Decreased activity tolerance   Difficulty participating in daily activities   Difficulty in participating in role as caregiver    Requires skilled supervision to complete and progress HEP   Requires skilled intervention and recommendations for AD/ orthotics     GOALS:   Status as of 9/7/18  Short term goals: 3-4 weeks, pt agrees to goals set.  1. Pt will perform HEP for basic strengthening and ROM with supervision to improve carryover of progress. Ongoing- pt reports difficulty with maintaining L knee flexion during supine exercises  2. Pt will demonstrate improved L DF to 4 degrees to improve foot clearance to decreased LLE ER during gait. Met 6/1/18-   3. Pt will demonstrate improved gait and mobility by demonstrating TUG with SBQC in 15 sec. Met 6/1/18  4. Pt will demonstrate improved motor control and use of LLE for mobility by performing 5 times sit<>stand test in 10 sec with UE use as needed. Met 7/6/18- 9.6 sec with UE support  5. Pt will demonstrate improved mobility/ gait and decreased fall risk from high to moderate by scoring 19/28 on Tinetti Assessment. Met 6/1/18- 24/28  6. Make appropriate recommendations for DME as needed. Met 6/1/18     Long term goals: 6 weeks, pt agrees to goals set  7. Pt will  demonstrate improved mobility and decreased fall risk by performing TUG with appropriate AD in 12 sec. improved- 12.2 sec (-1.6 sec)avg with SPC and L AFO  8. Pt will demonstrate improved L DF to 10 degrees to demonstrate improved resting position/ mobility of foot/ ankle and improve foot clearance during gait. Met 8/3/18- 12 deg AAROM  9. Pt will demonstrate improved mobility/ gait by scoring 22/28 on Tinetti Assessment. Met 6/1/18- 24/28  10. Pt will ambulate with appropriate AD for 150 ft without significant ER of LLE to demonstrate improved gait quality and reduce abnormal motions which may cause a secondary impairment of LBP. Improving- pt demonstrates improved control of LLE ER during gait, does not completely follow through in stance  11. Pt will demonstrate improved gait velocity to 1.14 m/s with appropraite AD to demonstrate a 25% improvement in gait. same- 0.92m/s with SPC but decrease AD used        Plan   Continue PT 2x weekly under established Plan of Care, with treatment to include: pt education, HEP, therapeutic exercises, neuromuscular re-education/balance exercises, therapeutic activities, joint mobilizations, and modalities PRN, to work towards established goals. Pt may be seen by PTA to carry out plan of care.      Monica Mosley, PT  9/14/2018

## 2018-09-17 ENCOUNTER — DOCUMENTATION ONLY (OUTPATIENT)
Dept: REHABILITATION | Facility: HOSPITAL | Age: 63
End: 2018-09-17

## 2018-09-20 ENCOUNTER — CLINICAL SUPPORT (OUTPATIENT)
Dept: REHABILITATION | Facility: HOSPITAL | Age: 63
End: 2018-09-20
Attending: PHYSICAL MEDICINE & REHABILITATION
Payer: MEDICARE

## 2018-09-20 DIAGNOSIS — M25.60 DECREASED RANGE OF MOTION: ICD-10-CM

## 2018-09-20 DIAGNOSIS — Z78.9 IMPAIRED ACTIVITIES OF DAILY LIVING: Primary | ICD-10-CM

## 2018-09-20 DIAGNOSIS — M62.81 MUSCLE WEAKNESS OF LEFT UPPER EXTREMITY: ICD-10-CM

## 2018-09-20 DIAGNOSIS — Z74.09 IMPAIRED FUNCTIONAL MOBILITY, BALANCE, GAIT, AND ENDURANCE: ICD-10-CM

## 2018-09-20 DIAGNOSIS — Z78.9 IMPAIRED MOTOR CONTROL: ICD-10-CM

## 2018-09-20 PROCEDURE — 97530 THERAPEUTIC ACTIVITIES: CPT | Mod: PO

## 2018-09-20 PROCEDURE — 97112 NEUROMUSCULAR REEDUCATION: CPT | Mod: KX,PO

## 2018-09-20 NOTE — PROGRESS NOTES
"  Occupational Therapy Daily Treatment Note     Name: Lacey Montana   Clinic Number: [id number]    Therapy Diagnosis:   Encounter Diagnoses   Name Primary?    Impaired activities of daily living Yes    Muscle weakness of left upper extremity     Decreased range of motion      Physician: Juan Pablo Anaya MD    Visit Date: 9/20/2018  Physician Orders: Evaluate and treat  Medical Diagnosis: CVA  Evaluation Date: 7/20/2018  Insurance Authorization Period Expiration: 12/31/2018  Plan of Care Certification Period: through 8/31/2018  Date of Return to MD: 8/20/2018    Visit # / Visits authorized: 14 / 20  Time In: 0903   Time Out: 0947   Total Billable Time: 44 minutes    Precautions:  Standard    Subjective     Pt reports: "my hand is hurting in this, can we take it off?" "My hand is really swollen today."   Response to previous treatment: NA   Functional change: progressing towards goals     Pain: 6/10   Location: L hand       Objective     Lacey participated in dynamic functional therapeutic activities to improve functional performance for 44  minutes, including:  Pt sitting at EOM at start of session with GG splint applied to her L hand/wrist from previous PT session.  Pt c/o pain in hand at start of session from splint wear and splint was removed from hand.      - Performed RM to L digits/hand/wrist x 10 min to decrease edema, improve joint mobility, decrease pain and improve lymphatic drainage to increase hand function.   - Mobilization of palm of L hand for stretching and metacarpal flexion and extension    - General wrist stretches including 1. Scaphoid on radius 2. Increasing mobility of metacarpals 3. Carpal rolls 4. Increasing mobility towards radial deviation 5. Increasing mobility of wrist towards extension 6. Increasing mobility of wrist towards supination/pronation  - Following mobilizations and passive stretches, patient was agreeable to application of GG paddle splint with thumb applied with " less abduction in the splint  - Scapular mobilization for elevation, depression, adduction and protraction assisted with active movement   - Assisted shoulder flexion with prolonged stretch at end range   - Assisted shoulder abduction with prolonged stretch at end range   - Assisted shoulder ER with prolonged stretch at end range   - Assisted flexor stretch with forward trunk lean   - GG splint was then removed and L hand was positioned on Shoulder I-Frame   - Body on arm, arm on body, and body with arm weight-shifts on Shoulder I-Frame with min A to achieve end range movements   - At end of treatment session, all equipment was removed from hand and OT performed metacarpal stretches and palm mobilization again.     Patient ambulated with U-Step and SBA and VCs to exit treatment session.      Pt continues to use therapist's GG paddle splint to see if she would be interested in purchasing one.     Home Exercises and Education Provided     Education provided: POC, HEP   - Edema management - performance of retrograde massage 2-3 times daily   - Donning, doffing and wear schedule of both resting hand splint and GG paddle splint   - Review of standing WB into open L hand on counter and emphasis on limiting grasping objects or holding on with her L hand.   - Progress towards goals - Goals remain appropriate      Written Home Exercises Provided: continue current HEP. Pt was verbally instructed on retrograde massage for L hand edema management.    Exercises were reviewed and Lacey was able to demonstrate them prior to the end of the session.  Lacey demonstrated good  understanding of the education provided.     Assessment   Patient tolerated treatment session well.  She reported pain and swelling throughout her L hand at start of treatment session this date.  Retrograde massage performed to decrease edema and improve circulation and lymphatic drainage with improvement noted post intervention.  She tolerated all of  the above exercises well and without pain.  She tolerated the GG splint well and without complaints with thumb positioned with less abduction.  Patient would benefit from continued skilled OT intervention.  She demonstrates good participation and she is always motivated to try new activities to help her maximize LUE functional movement.  Patient continues to report compliance with her HEP and splint wear.  Lacey is progressing well towards her goals and there are no updates to goals at this time. Pt prognosis is Good.  Pt will continue to benefit from skilled outpatient occupational therapy to address the deficits listed in the problem list on initial evaluation provide pt/family education and to maximize pt's level of independence in the home and community environment.     Anticipated barriers to occupational therapy: None at this time    Pt's spiritual, cultural and educational needs considered and pt agreeable to plan of care and goals.    Goals:    LTG GOALS:  Time frame: 6 weeks  Increase ROM/Strength to perform AL's and functional activities (specify)  Pt will increase L shoulder flexion AAROM to 100* as needed to improve dressing tasks incorporating L UE   Pt will increase L elbow flexion AAROM to 120* as needed to improve dressing task incorporating L UE   LB dressing will improve to Mod I     STG Goals:  Time frame: 3 weeks  Increase ROM/Strength to perform ADL's and functional activities (specify)  Pt will increase L shoulder flexion AROM to 90* as needed to improve dressing tasks incorporating L UE   Pt will increase L elbow flexion AROM to 100* as needed to improve dressing task incorporating L UE   Pt will improve  in L hand to register on [employer] in order to improve functional use of L hand   Pt will be independent with tying shoes using one-hand technique.   Pt will be independent with HEP to improve ROM to L UE.     Plan   Patient/caregiver understands and agrees with plan of  care.  Outpatient occupational therapy 2  times weekly for 6 weeks  to include: pt ed, hep, therapeutic exercises, therapeutic activity, ADLs,  neuromuscular re-education, joint mobilizations, modalities prn,      Discussed Plan of Care with patient: Yes  Updates/Grading for next session: Continue increase in ROM to shoulder and hand and issue new HEPs.       Cari Argueta OT   9/20/2018

## 2018-09-20 NOTE — PROGRESS NOTES
"                                                      Physical Therapy Progress Note      Name: Lacey Montana  Clinic Number: [id number]  Diagnosis:        Encounter Diagnoses   Name Primary?    Impaired functional mobility, balance, gait, and endurance      Impaired motor control        Physician: Juan Pablo Anaya MD  Treatment Orders: PT Evaluation and Treatment       Past Medical History:   Diagnosis Date    Anticoagulant long-term use      Encounter for blood transfusion      GERD (gastroesophageal reflux disease)      Seizures      Stroke           Precautions: universal  Precautions: universal  Visit #: [id number]  Date of Eval: 5/01/18  Plan of Care Expiration: 10/2/18     Functional Limitations Reports - G Codes  Category: mobility   Tool: tinetti, TUG, SSWS  Score: 24/28, 12.2 sec with LBQC, 0.92 m/s with LBQC     G codes 3/10    eval CJ-CI   6/1/18 CJ-CI   7/6/18 CJ-CI   8/3/18 CJ-CI            Subjective   Pt reports: no new complaints    Pain Scale: no complaints     Objective     pt participated in neuromuscular re education x 45 minutes to address motor control and coordination to improve all mobility and balance:      EOM:  Restoration of arches of L hand  6 basic wrist stretches: 1. P-A glide of scaphoid on radius, 2. Radial deviation,               3. Increasing mobility of metacarpals, 4. Carpal roll, 5. Movement of forearm on    hand towards wrist extension, 6. Movement of forearm on the hand toward  supination/ pronation  donned jean gate splint on LUE     DF stretch with wedge with restoration of arches  L hip IR stretch with trunk rotation    Supine: L 1/2 bridge 10x   LTR 10x    Step tap without UE support R up on 8" step, min A for balance/ knee stability- 10x    Pre gait with 1 UE support:    forward step with RLE- good to fair balance                Not performed:  Stand<> tall kneeling EOM with ball- mod A  Tall kneeling with BUE support on ball- poor+ balance   RUE lift off of " "ball- poor+ balance    Sit to stands with I bar, an emphasis on equal weight distribution- 10x, min A           Stairs:  Ascending 2 flights of stairs 10 steps and 17 steps with step to gait pattern leading with RLE, 1 UE support and CGA, pre tx   Descending 2 flights of stairs 10 steps and 17 steps with step to gait pattern leading with LLE, 1 UE support CGA, post tx    Gait with ' x 2  Close SBA VC for increased weight shifts to left stance limb  Prone on forearms: rocking side to side left UE weight bearing and ventral body stretch                                   Quad stretch 3 x 30"                                    Left HSC 2 x 10 AAROM  Supine: Bridges 2 x 10                HSS 3 x 30"               SKC stretch 3 x 30"                Hip add stretch 3 x 30"                HIp IR/ER stretch 3 x 30"                PF stretch 3 x 30"    BAPS board level 3, DF with Min A to control movements  // Bars tandem gait front, RUE support- 4 laps               Fwd gait with no UE support 4 laps     Backwards gait with RUE support, mod A for backwards (LLE placement)- 4 laps   Cybex Leg Press:              2 x 15 w/90# DL              3 x 10 w/70# SL L                pregait without RUE support:   2 x 10 reps of R LE target taps to 4 inch step returning to midline after each to promote weight shifting with 1 UE support and CGA NP  2 x 10 reps of R LE target taps to 4 inch  steps w/o turning to midline with 1 UE support and CGA NP      Written Home Exercises:   Pt demo good understanding of the education provided including: bridges with ball squeeze, internal rotation hip stretch and PPT. Lacey demonstrated good return demonstration of activities.      Education provided re: POC, HEP  No spiritual or educational barriers to learning provided     Pt has no cultural, educational or language barriers to learning provided.     Assessment     Lacey tolerated treatment well. Pt demonstrated decreased tone in L " hand/wrist when donning jean gait. Pt demonstrated improved ability to support body with RLE with assistance for weight shift and balance. Increased tone, decreased ROM, and decreased motor control continue to impair progress with mobility. Pt instructed to stretch L foot on a regular basis in order to improve L ankle and great toe mobility. Pt can benefit from continued skilled PT to address impairments to improve quality of mobility to improve balance, decrease fall risk, and decrease possibility of secondary impairments (i.e. OA).       Anticipated barriers to progress:length of time since onset of impairments, seizures, recent medication changes, recent onset of dizziness     Impairment List:  Fall Risk - impaired balance   Weakness   Impaired motor control  Decreased ROM  Gait deviations   Decreased ambulation   Decreased activity tolerance   Difficulty participating in daily activities   Difficulty in participating in role as caregiver    Requires skilled supervision to complete and progress HEP   Requires skilled intervention and recommendations for AD/ orthotics     GOALS:   Status as of 9/7/18  Short term goals: 3-4 weeks, pt agrees to goals set.  1. Pt will perform HEP for basic strengthening and ROM with supervision to improve carryover of progress. Ongoing- pt reports difficulty with maintaining L knee flexion during supine exercises  2. Pt will demonstrate improved L DF to 4 degrees to improve foot clearance to decreased LLE ER during gait. Met 6/1/18-   3. Pt will demonstrate improved gait and mobility by demonstrating TUG with SBQC in 15 sec. Met 6/1/18  4. Pt will demonstrate improved motor control and use of LLE for mobility by performing 5 times sit<>stand test in 10 sec with UE use as needed. Met 7/6/18- 9.6 sec with UE support  5. Pt will demonstrate improved mobility/ gait and decreased fall risk from high to moderate by scoring 19/28 on Tinetti Assessment. Met 6/1/18- 24/28  6. Make  appropriate recommendations for DME as needed. Met 6/1/18     Long term goals: 6 weeks, pt agrees to goals set  7. Pt will demonstrate improved mobility and decreased fall risk by performing TUG with appropriate AD in 12 sec. improved- 12.2 sec (-1.6 sec)avg with SPC and L AFO  8. Pt will demonstrate improved L DF to 10 degrees to demonstrate improved resting position/ mobility of foot/ ankle and improve foot clearance during gait. Met 8/3/18- 12 deg AAROM  9. Pt will demonstrate improved mobility/ gait by scoring 22/28 on Tinetti Assessment. Met 6/1/18- 24/28  10. Pt will ambulate with appropriate AD for 150 ft without significant ER of LLE to demonstrate improved gait quality and reduce abnormal motions which may cause a secondary impairment of LBP. Improving- pt demonstrates improved control of LLE ER during gait, does not completely follow through in stance  11. Pt will demonstrate improved gait velocity to 1.14 m/s with appropraite AD to demonstrate a 25% improvement in gait. same- 0.92m/s with SPC but decrease AD used        Plan   Continue PT 2x weekly under established Plan of Care, with treatment to include: pt education, HEP, therapeutic exercises, neuromuscular re-education/balance exercises, therapeutic activities, joint mobilizations, and modalities PRN, to work towards established goals. Pt may be seen by PTA to carry out plan of care.      Nic Weiss, PTA  9/20/2018

## 2018-09-25 ENCOUNTER — CLINICAL SUPPORT (OUTPATIENT)
Dept: REHABILITATION | Facility: HOSPITAL | Age: 63
End: 2018-09-25
Attending: PHYSICAL MEDICINE & REHABILITATION
Payer: MEDICARE

## 2018-09-25 DIAGNOSIS — Z78.9 IMPAIRED MOTOR CONTROL: ICD-10-CM

## 2018-09-25 DIAGNOSIS — Z74.09 IMPAIRED FUNCTIONAL MOBILITY, BALANCE, GAIT, AND ENDURANCE: ICD-10-CM

## 2018-09-25 PROCEDURE — 97112 NEUROMUSCULAR REEDUCATION: CPT | Mod: KX,PO

## 2018-09-25 NOTE — PROGRESS NOTES
"                                                      Physical Therapy Progress Note      Name: Lacey Montana  Clinic Number: [id number]  Diagnosis:        Encounter Diagnoses   Name Primary?    Impaired functional mobility, balance, gait, and endurance      Impaired motor control        Physician: Juan Pablo Anaya MD  Treatment Orders: PT Evaluation and Treatment       Past Medical History:   Diagnosis Date    Anticoagulant long-term use      Encounter for blood transfusion      GERD (gastroesophageal reflux disease)      Seizures      Stroke           Precautions: universal  Precautions: universal  Visit #: [id number]  Date of Eval: 5/01/18  Plan of Care Expiration: 10/2/18     Functional Limitations Reports - G Codes  Category: mobility   Tool: tinetti, TUG, SSWS  Score: 24/28, 12.2 sec with LBQC, 0.92 m/s with LBQC     G codes 3/10    eval CJ-CI   6/1/18 CJ-CI   7/6/18 CJ-CI   8/3/18 CJ-CI            Subjective   Pt reports: no new complaints    Pain Scale: no complaints     Objective     pt participated in neuromuscular re education x 45 minutes to address motor control and coordination to improve all mobility and balance:      EOM:  Restoration of arches of L hand  6 basic wrist stretches: 1. P-A glide of scaphoid on radius, 2. Radial deviation,               3. Increasing mobility of metacarpals, 4. Carpal roll, 5. Movement of forearm on    hand towards wrist extension, 6. Movement of forearm on the hand toward  supination/ pronation  donned jean gate splint on LUE     DF stretch with wedge with restoration of arches  L hip IR stretch with trunk rotation    Supine: L 1/2 bridge 10x   LTR 10x    Step tap without UE support R up on 6" step, min A for balance/ knee stability- 5x    Pre gait with 1 UE support:    forward step with LLE- good to fair balance                Not performed:  Stand<> tall kneeling EOM with ball- mod A  Tall kneeling with BUE support on ball- poor+ balance   RUE lift off of " "ball- poor+ balance    Sit to stands with I bar, an emphasis on equal weight distribution- 10x, min A           Stairs:  Ascending 2 flights of stairs 10 steps and 17 steps with step to gait pattern leading with RLE, 1 UE support and CGA, pre tx   Descending 2 flights of stairs 10 steps and 17 steps with step to gait pattern leading with LLE, 1 UE support CGA, post tx    Gait with ' x 2  Close SBA VC for increased weight shifts to left stance limb  Prone on forearms: rocking side to side left UE weight bearing and ventral body stretch                                   Quad stretch 3 x 30"                                    Left HSC 2 x 10 AAROM  Supine: Bridges 2 x 10                HSS 3 x 30"               SKC stretch 3 x 30"                Hip add stretch 3 x 30"                HIp IR/ER stretch 3 x 30"                PF stretch 3 x 30"    BAPS board level 3, DF with Min A to control movements  // Bars tandem gait front, RUE support- 4 laps               Fwd gait with no UE support 4 laps     Backwards gait with RUE support, mod A for backwards (LLE placement)- 4 laps   Cybex Leg Press:              2 x 15 w/90# DL              3 x 10 w/70# SL L                pregait without RUE support:   2 x 10 reps of R LE target taps to 4 inch step returning to midline after each to promote weight shifting with 1 UE support and CGA NP  2 x 10 reps of R LE target taps to 4 inch  steps w/o turning to midline with 1 UE support and CGA NP      Written Home Exercises:   Pt demo good understanding of the education provided including: bridges with ball squeeze, internal rotation hip stretch and PPT. Lacey demonstrated good return demonstration of activities.      Education provided re: POC, HEP  No spiritual or educational barriers to learning provided     Pt has no cultural, educational or language barriers to learning provided.     Assessment     Lacey tolerated treatment well. Pt demonstrated improved ability to " support body with RLE with assistance for weight shift and balance. Increased tone, decreased ROM, and decreased motor control continue to impair progress with mobility. Pt's could benefit from continued L hip stretching secondary LLE external rotation in terminal stance. Pt instructed to stretch L foot on a regular basis in order to improve L ankle/hip and great toe mobility. Pt continued to demonstrate decreased tone in L hand/wrist when donning jean gait. Pt can benefit from continued skilled PT to address impairments to improve quality of mobility to improve balance, decrease fall risk, and decrease possibility of secondary impairments (i.e. OA).       Anticipated barriers to progress:length of time since onset of impairments, seizures, recent medication changes, recent onset of dizziness     Impairment List:  Fall Risk - impaired balance   Weakness   Impaired motor control  Decreased ROM  Gait deviations   Decreased ambulation   Decreased activity tolerance   Difficulty participating in daily activities   Difficulty in participating in role as caregiver    Requires skilled supervision to complete and progress HEP   Requires skilled intervention and recommendations for AD/ orthotics     GOALS:   Status as of 9/7/18  Short term goals: 3-4 weeks, pt agrees to goals set.  1. Pt will perform HEP for basic strengthening and ROM with supervision to improve carryover of progress. Ongoing- pt reports difficulty with maintaining L knee flexion during supine exercises  2. Pt will demonstrate improved L DF to 4 degrees to improve foot clearance to decreased LLE ER during gait. Met 6/1/18-   3. Pt will demonstrate improved gait and mobility by demonstrating TUG with SBQC in 15 sec. Met 6/1/18  4. Pt will demonstrate improved motor control and use of LLE for mobility by performing 5 times sit<>stand test in 10 sec with UE use as needed. Met 7/6/18- 9.6 sec with UE support  5. Pt will demonstrate improved mobility/ gait and  decreased fall risk from high to moderate by scoring 19/28 on Tinetti Assessment. Met 6/1/18- 24/28  6. Make appropriate recommendations for DME as needed. Met 6/1/18     Long term goals: 6 weeks, pt agrees to goals set  7. Pt will demonstrate improved mobility and decreased fall risk by performing TUG with appropriate AD in 12 sec. improved- 12.2 sec (-1.6 sec)avg with SPC and L AFO  8. Pt will demonstrate improved L DF to 10 degrees to demonstrate improved resting position/ mobility of foot/ ankle and improve foot clearance during gait. Met 8/3/18- 12 deg AAROM  9. Pt will demonstrate improved mobility/ gait by scoring 22/28 on Tinetti Assessment. Met 6/1/18- 24/28  10. Pt will ambulate with appropriate AD for 150 ft without significant ER of LLE to demonstrate improved gait quality and reduce abnormal motions which may cause a secondary impairment of LBP. Improving- pt demonstrates improved control of LLE ER during gait, does not completely follow through in stance  11. Pt will demonstrate improved gait velocity to 1.14 m/s with appropraite AD to demonstrate a 25% improvement in gait. same- 0.92m/s with SPC but decrease AD used        Plan   Continue PT 2x weekly under established Plan of Care, with treatment to include: pt education, HEP, therapeutic exercises, neuromuscular re-education/balance exercises, therapeutic activities, joint mobilizations, and modalities PRN, to work towards established goals. Pt may be seen by PTA to carry out plan of care.      Nic Weiss, PTA  9/25/2018

## 2018-09-28 ENCOUNTER — CLINICAL SUPPORT (OUTPATIENT)
Dept: REHABILITATION | Facility: HOSPITAL | Age: 63
End: 2018-09-28
Attending: PHYSICAL MEDICINE & REHABILITATION
Payer: MEDICARE

## 2018-09-28 DIAGNOSIS — M62.81 MUSCLE WEAKNESS OF LEFT UPPER EXTREMITY: ICD-10-CM

## 2018-09-28 DIAGNOSIS — Z78.9 IMPAIRED MOTOR CONTROL: ICD-10-CM

## 2018-09-28 DIAGNOSIS — Z78.9 IMPAIRED ACTIVITIES OF DAILY LIVING: ICD-10-CM

## 2018-09-28 DIAGNOSIS — M25.612 DECREASED RANGE OF MOTION OF LEFT SHOULDER: Primary | ICD-10-CM

## 2018-09-28 DIAGNOSIS — Z74.09 IMPAIRED FUNCTIONAL MOBILITY, BALANCE, GAIT, AND ENDURANCE: ICD-10-CM

## 2018-09-28 PROCEDURE — 97112 NEUROMUSCULAR REEDUCATION: CPT | Mod: PO

## 2018-09-28 PROCEDURE — G8987 SELF CARE CURRENT STATUS: HCPCS | Mod: CK,PO | Performed by: OPTOMETRIST

## 2018-09-28 PROCEDURE — G8988 SELF CARE GOAL STATUS: HCPCS | Mod: CJ,PO | Performed by: OPTOMETRIST

## 2018-09-28 PROCEDURE — 97530 THERAPEUTIC ACTIVITIES: CPT | Mod: PO | Performed by: OPTOMETRIST

## 2018-09-28 NOTE — PROGRESS NOTES
"                                                      Physical Therapy Progress Note      Name: Lacey Montana  Clinic Number: [id number]  Diagnosis:        Encounter Diagnoses   Name Primary?    Impaired functional mobility, balance, gait, and endurance      Impaired motor control        Physician: Juan Pablo Anaya MD  Treatment Orders: PT Evaluation and Treatment       Past Medical History:   Diagnosis Date    Anticoagulant long-term use      Encounter for blood transfusion      GERD (gastroesophageal reflux disease)      Seizures      Stroke           Precautions: universal  Precautions: universal  Visit #: [id number]  Date of Eval: 5/01/18  Plan of Care Expiration: 10/2/18     Functional Limitations Reports - G Codes  Category: mobility   Tool: tinetti, TUG, SSWS  Score: 24/28, 12.2 sec with LBQC, 0.92 m/s with LBQC     G codes 4/10    eval CJ-CI   6/1/18 CJ-CI   7/6/18 CJ-CI   8/3/18 CJ-CI            Subjective   Pt reports: no new complaints    Pain Scale: no complaints     Objective   Leah seen by OT previous to PT and Wainwright splint applied by OT    pt participated in neuromuscular re education x 45 minutes to address motor control and coordination to improve all mobility and balance:      EOM:  Restoration of arches of L hand  6 basic wrist stretches: 1. P-A glide of scaphoid on radius, 2. Radial deviation,               3. Increasing mobility of metacarpals, 4. Carpal roll, 5. Movement of forearm on    hand towards wrist extension, 6. Movement of forearm on the hand toward  supination/ pronation  jean gate splint on LUE NP    DF stretch with wedge with restoration of arches  L hip IR stretch with trunk rotation    Supine: L 1/2 bridge 10x   LTR 10x    Step tap without UE support R up on 6" step, min A for balance/ knee stability- 5x    Pre gait with 1 UE support:    forward step with LLE- good to fair balance                Not performed:  Stand<> tall kneeling EOM with ball- mod A  Tall " "kneeling with BUE support on ball- poor+ balance   RUE lift off of ball- poor+ balance    Sit to stands with I bar, an emphasis on equal weight distribution- 10x, min A           Stairs:  Ascending 2 flights of stairs 10 steps and 17 steps with step to gait pattern leading with RLE, 1 UE support and CGA, pre tx   Descending 2 flights of stairs 10 steps and 17 steps with step to gait pattern leading with LLE, 1 UE support CGA, post tx    Gait with 150' w/Rollator for UE wt bearing   Close SBA VC for increased weight shifts to left stance limb  Prone on forearms: rocking side to side left UE weight bearing and ventral body stretch                                   Quad stretch 3 x 30"                                    Left HSC 2 x 10 AAROM  Supine: Bridges 2 x 10                HSS 3 x 30"               SKC stretch 3 x 30"                Hip add stretch 3 x 30"                HIp IR/ER stretch 3 x 30"                PF stretch 3 x 30"    BAPS board level 3, DF with Min A to control movements  // Bars tandem gait front, RUE support- 4 laps               Fwd gait with no UE support 4 laps     Backwards gait with RUE support, mod A for backwards (LLE placement)- 4 laps   Cybex Leg Press:              2 x 15 w/90# DL              3 x 10 w/90# SL L                pregait without RUE support:   2 x 10 reps of R LE target taps to 4 inch step returning to midline after each to promote weight shifting with 1 UE support and CGA NP  2 x 10 reps of R LE target taps to 4 inch  steps w/o turning to midline with 1 UE support and CGA NP      Written Home Exercises:   Pt demo good understanding of the education provided including: bridges with ball squeeze, internal rotation hip stretch and PPTAugie Rahman demonstrated good return demonstration of activities.      Education provided re: POC, HEP  No spiritual or educational barriers to learning provided     Pt has no cultural, educational or language barriers to learning " provided.     Assessment     Lacey tolerated treatment well. .       Anticipated barriers to progress:length of time since onset of impairments, seizures, recent medication changes, recent onset of dizziness     Impairment List:  Fall Risk - impaired balance   Weakness   Impaired motor control  Decreased ROM  Gait deviations   Decreased ambulation   Decreased activity tolerance   Difficulty participating in daily activities   Difficulty in participating in role as caregiver    Requires skilled supervision to complete and progress HEP   Requires skilled intervention and recommendations for AD/ orthotics     GOALS:   Status as of 9/7/18  Short term goals: 3-4 weeks, pt agrees to goals set.  1. Pt will perform HEP for basic strengthening and ROM with supervision to improve carryover of progress. Ongoing- pt reports difficulty with maintaining L knee flexion during supine exercises  2. Pt will demonstrate improved L DF to 4 degrees to improve foot clearance to decreased LLE ER during gait. Met 6/1/18-   3. Pt will demonstrate improved gait and mobility by demonstrating TUG with SBQC in 15 sec. Met 6/1/18  4. Pt will demonstrate improved motor control and use of LLE for mobility by performing 5 times sit<>stand test in 10 sec with UE use as needed. Met 7/6/18- 9.6 sec with UE support  5. Pt will demonstrate improved mobility/ gait and decreased fall risk from high to moderate by scoring 19/28 on Tinetti Assessment. Met 6/1/18- 24/28  6. Make appropriate recommendations for DME as needed. Met 6/1/18     Long term goals: 6 weeks, pt agrees to goals set  7. Pt will demonstrate improved mobility and decreased fall risk by performing TUG with appropriate AD in 12 sec. improved- 12.2 sec (-1.6 sec)avg with SPC and L AFO  8. Pt will demonstrate improved L DF to 10 degrees to demonstrate improved resting position/ mobility of foot/ ankle and improve foot clearance during gait. Met 8/3/18- 12 deg AAROM  9. Pt will demonstrate  improved mobility/ gait by scoring 22/28 on Tinetti Assessment. Met 6/1/18- 24/28  10. Pt will ambulate with appropriate AD for 150 ft without significant ER of LLE to demonstrate improved gait quality and reduce abnormal motions which may cause a secondary impairment of LBP. Improving- pt demonstrates improved control of LLE ER during gait, does not completely follow through in stance  11. Pt will demonstrate improved gait velocity to 1.14 m/s with appropraite AD to demonstrate a 25% improvement in gait. same- 0.92m/s with SPC but decrease AD used        Plan   Continue PT 2x weekly under established Plan of Care, with treatment to include: pt education, HEP, therapeutic exercises, neuromuscular re-education/balance exercises, therapeutic activities, joint mobilizations, and modalities PRN, to work towards established goals. Pt may be seen by PTA to carry out plan of care.      Shahram Louis, PTA  9/28/2018

## 2018-09-28 NOTE — PROGRESS NOTES
Occupational Therapy Daily Treatment Note     Name: Lacey Montana   Clinic Number: [id number]    Therapy Diagnosis:   Encounter Diagnoses   Name Primary?    Decreased range of motion of left shoulder Yes    Impaired activities of daily living     Muscle weakness of left upper extremity      Physician: Juan Pablo Anaya MD    Visit Date: 9/28/2018  Physician Orders: Evaluate and treat  Medical Diagnosis: CVA  Evaluation Date: 7/20/2018  Insurance Authorization Period Expiration: 12/31/2018  Plan of Care Certification Period: through 10/8/2018  Date of Return to MD: 8/20/2018    Visit # / Visits authorized: 15 / 20  Time In: 0815  Time Out: 0900   Total Billable Time: 45 minutes    Precautions:  Standard    Subjective     Pt reports: She is having difficulty wearing her GG paddle splint at home for more than 30 minutes.   Response to previous treatment: New strategies for splint schedule and fit addressed today  Functional change: progressing towards goals     Pain: No pain, just feeling the stretch    Location: L hand and shoulder     Objective     Lacey participated in dynamic functional therapeutic activities to improve functional performance for 45  minutes, including:  Pt ambulated to session with her rollator today instead of her rolling basket. She had both her resting hand and GG paddle splint with her. She was seated at EOM.       - Mobilization of palm of L hand for stretching and metacarpal flexion and extension    - General wrist stretches including 1. Scaphoid on radius 2. Increasing mobility of metacarpals 3. Carpal rolls 4. Increasing mobility towards radial deviation 5. Increasing mobility of wrist towards extension 6. Increasing mobility of wrist towards supination/pronation  - Placement of open hand on hard surface next to her with assisted WB from therapist  into hand and wrist as Pt leaned forward to promote increased wrist extension  - Following mobilizations and passive stretches,  patient was agreeable to application of GG paddle splint with thumb applied with less abduction in the splint.   -Self application of GG paddle splint was reviewed and is it was discussed that in order to increase time of L hand in splint, relief for thumb can be given to promote increased time for digits and wrist to be extended. Pt expressed understanding.   - Scapular mobilization for elevation, depression, adduction and protraction assisted with active movement   - Assisted shoulder flexion with prolonged stretch at end range   - Assisted shoulder abduction with prolonged stretch at end range   - Assisted shoulder ER with prolonged stretch at end range   - Assisted flexor stretch with forward trunk lean   - GG paddle splint was removed for additional AAROM of digits, thumb and wrist.   - Resting hand splint donning was also reviewed and strategy of wearing her resting hand splint while using her rollator was explained in order to discourage gripping the L handle of the rollator with her L hand but using her forearm and shoulder for WB into the rollator during gait. She expressed understanding.   - Discussed with PTA who will see her next to use her resting hand splint for WB into rollator during ambulation.     Pt continues to use therapist's GG paddle splint to see if she would be interested in purchasing one.     Home Exercises and Education Provided     Education provided: POC, HEP   - Edema management - performance of retrograde massage 2-3 times daily   - Donning, doffing and wear schedule of both resting hand splint and GG paddle splint with instructions on releasing thumb in order to extend wear time but increasing comfort  - Review of standing WB into open L hand on counter and emphasis on limiting grasping objects or holding on with her L hand (especially L handle of rollator)   - Progress towards goals - Goals remain appropriate      Written Home Exercises Provided: continue current HEP. Pt was  verbally instructed on retrograde massage for L hand edema management.    Exercises were reviewed and Lacey was able to demonstrate them prior to the end of the session.  Lacey demonstrated good  understanding of the education provided.     Assessment   Ms Montana did not have the difficulty with edema in her L hand that she experience in last session. She did have difficulty with wear times for the  GG splint at home and with instructions to release  thumb positioned with less abduction she finds the splint more comfortable. New    Patient would benefit from continued skilled OT intervention.  She demonstrates good participation and she is always motivated to try new activities to help her maximize LUE functional movement.  Lacey is progressing well towards her goals and there are no updates to goals at this time. Pt prognosis is Good.  Pt will continue to benefit from skilled outpatient occupational therapy to address the deficits listed in the problem list on initial evaluation provide pt/family education and to maximize pt's level of independence in the home and community environment.     Anticipated barriers to occupational therapy: None at this time    Pt's spiritual, cultural and educational needs considered and pt agreeable to plan of care and goals.    Goals:    LTG GOALS:  Time frame: 6 weeks  Increase ROM/Strength to perform AL's and functional activities (specify)  Pt will increase L shoulder flexion AAROM to 100* as needed to improve dressing tasks incorporating L UE - NOT MET but progressing   Pt will increase L elbow flexion AAROM to 120* as needed to improve dressing task incorporating L UE  - NOT MET but progressing   LB dressing will improve to Mod I - MET      STG Goals:  Time frame: 3 weeks  Increase ROM/Strength to perform ADL's and functional activities (specify)  Pt will increase L shoulder flexion AROM to 90* as needed to improve dressing tasks incorporating L UE - NOT MET but  Progressing   Pt will increase L elbow flexion AROM to 100* as needed to improve dressing task incorporating L UE - NOT MET but Progressing   Pt will improve  in L hand to register on [employer] in order to improve functional use of L hand  -  MET  Pt will be independent with tying shoes using one-hand technique. - MET   Pt will be independent with HEP to improve ROM to L UE. - MET     Plan   Patient/caregiver understands and agrees with plan of care.  Outpatient occupational therapy 2  times weekly for 6 weeks  to include: pt ed, hep, therapeutic exercises, therapeutic activity, ADLs,  neuromuscular re-education, joint mobilizations, modalities prn,      Discussed Plan of Care with patient: Yes  Updates/Grading for next session: Continue increase in ROM to shoulder and hand and issue new HEPs.       IRIS Lopez   9/28/2018

## 2018-10-09 ENCOUNTER — DOCUMENTATION ONLY (OUTPATIENT)
Dept: REHABILITATION | Facility: HOSPITAL | Age: 63
End: 2018-10-09

## 2018-10-09 NOTE — PROGRESS NOTES
Patient: Lacey Montana  Date of Session: 10/9/2018  Diagnosis: No diagnosis found.  MRN: [id number]    Lacey Montana  no-showed today's appointment; appointment was scheduled for 8:15 am.  Pt did not call to cancel or reschedule today's appointment.  Next appointment is scheduled for 10/12/18 and will follow up with patient at that time.  No charges have been posted today.

## 2018-10-12 ENCOUNTER — DOCUMENTATION ONLY (OUTPATIENT)
Dept: REHABILITATION | Facility: HOSPITAL | Age: 63
End: 2018-10-12

## 2018-10-12 NOTE — PROGRESS NOTES
Patient: Lacey Montana  Date of Session: 10/12/2018  Diagnosis: No diagnosis found.  MRN: [id number]     Lacey Montana no-showed today's appointment; appointment was scheduled for 8:15 am.  Pt did not call to cancel or reschedule today's appointment. This is her 2nd consecutive no-show.  OT attempted to contact patient in regards to attendance, but patient did not answer call and there was no option for VM.  Next appointment is scheduled for 10/17/18 and will follow up with patient at that time.  No charges have been posted today.

## 2018-10-17 ENCOUNTER — DOCUMENTATION ONLY (OUTPATIENT)
Dept: REHABILITATION | Facility: HOSPITAL | Age: 63
End: 2018-10-17

## 2018-10-17 NOTE — PROGRESS NOTES
Patient: Lacey Montana  Date of Session: 10/17/2018  Diagnosis: No diagnosis found.  MRN: [id number]    Lacey Montana no-showed today's appointment; appointment was scheduled for 9:45 am.  Pt did not call to cancel or reschedule today's appointment. This is the 3rd consecutive appointment that she did not attend.  OT has attempted to call patient in regards to attendance and continuation of care.  If patient does not return call or participate in next visit scheduled on 10/19/18, OT will discharge.  No charges have been posted today.

## 2018-10-17 NOTE — PROGRESS NOTES
Patient did not attend today's PT visit. Patient did not call to cancel or reschedule.    Monica Mosley, [place]  10/17/2018

## 2018-10-24 ENCOUNTER — DOCUMENTATION ONLY (OUTPATIENT)
Dept: REHABILITATION | Facility: HOSPITAL | Age: 63
End: 2018-10-24

## 2018-10-24 NOTE — PROGRESS NOTES
Patient did not attend today's PT visit. Patient did not call to cancel or reschedule. Pt's OT called and left a message on emergency contact's voicemail regarding missed visits.     Monica Mosley DPT  10/24/2018

## 2018-10-26 ENCOUNTER — DOCUMENTATION ONLY (OUTPATIENT)
Dept: REHABILITATION | Facility: HOSPITAL | Age: 63
End: 2018-10-26

## 2018-10-26 NOTE — PROGRESS NOTES
OUTPATIENT OCCUPATIONAL THERAPY DISCHARGE SUMMARY     Name: Lacey Montana  Clinic Number: [id number]  Physician: Juan Pablo Anaya MD  Treatment Orders: OT Eval and Treat   Medical Diagnosis: CVA    OT Diagnosis:   Encounter Diagnoses   Name Primary?    Impaired activities of daily living Yes    Muscle weakness of left upper extremity      Decreased range of motion      Past Medical History:   Diagnosis Date    Anticoagulant long-term use     Encounter for blood transfusion     GERD (gastroesophageal reflux disease)     Seizures     Stroke      Initial visit: 7/20/18   Date of Last visit: 9/28/18   Date of Discharge Note:  10/26/18   Total Visits Received: 15     ASSESSMENT   Status Towards Goals Met:   Not met.     Goals Not achieved and why: Patient has been out of therapy for ~ 1 month due to medical issues.  She reported hospital admission x 1 week for pneumonia and UTI.  She was then seen by Home Health Occupational Therapy and Physical Therapy services for two weeks per patient report.  She has been discharged from  and expressed interest in returning to [facility] Outpatient Occupational Therapy.  Due to change in medical status with hospital admission and Home Health care, patient needs new orders from MD to return to OP Therapy services.        Discharge reason: Hospital admission followed by Home Health Therapy     PLAN   This patient is discharged from Outpatient Occupational Therapy Services.       ANGI Villagomez LOTR

## 2018-10-31 ENCOUNTER — DOCUMENTATION ONLY (OUTPATIENT)
Dept: REHABILITATION | Facility: HOSPITAL | Age: 63
End: 2018-10-31

## 2018-10-31 NOTE — PROGRESS NOTES
PHYSICAL THERAPY DISCHARGE SUMMARY     Name: Lacey Montana  [facility] Number: [id number]    Diagnosis:   Encounter Diagnoses   Name Primary?    Impaired functional mobility, balance, gait, and endurance      Impaired motor control         Physician: Juan Pablo Anaya MD  Treatment Orders: PT Eval and Treat  Past Medical History:   Diagnosis Date    Anticoagulant long-term use     Encounter for blood transfusion     GERD (gastroesophageal reflux disease)     Seizures     Stroke        Initial visit: 5/1/2018  Date of Last visit: 9/28/2018  Total Visits Received: 28      Functional Limitations Reports - G Codes  Category:  mobility   Tool: tinetti, TUG, SSWS  Score: Not tested due to unexpected d/c  Goal: CI at least 1% but less than 20% impaired, limited or restricted    DME recommended:SBQC or Rolator. Pt obtained Rolator prior to d/c  HEP provided:bridges with ball squeeze, internal rotation hip stretch and PPT.  Pain:denied at last visit    OBJECTIVE     ROM:   UPPER EXTREMITY--AROM/PROM  Not tested due to unexpected d/c  Refer to OT report for details         RANGE OF MOTION--LOWER EXTREMITIES  (R) LE WFLs  (L) LE: Not tested due to unexpected d/c      Strength: manual muscle test grades below   Upper Extremity Strength  Not tested due to unexpected d/c  Refer to OT report for details    Lower Extremity Strength  RLE: grossly WFLs  LLE: Not tested due to unexpected d/c    Abdominal Strength: NT     Evaluation   Single Limb Stance R LE Not tested due to unexpected d/c  (<10 sec = HIGH FALL RISK)   Single Limb Stance L LE Not tested due to unexpected d/c  (<10 sec = HIGH FALL RISK)   30 second Chair Rise NT   5 times sit-stand NT     Gait Assessment:   - AD used: rolator/ SBQC  - Assistance:mod I  - Distance: community/ household  - Curb: NT  - Ramp:  Mod I with AD     Evaluation   Timed Up and Go Not tested due to unexpected d/c   Self Selected Walking Speed Not tested due to unexpected d/c   Fast  Walking Speed Not tested due to unexpected d/c     Functional Mobility (Bed mobility, transfers)  Bed mobility: Mod I  Supine to sit: Mod I  Sit to supine: Mod I  Rolling: Mod I  Transfers to bed: Mod I  Transfers to toilet: Mod I  Sit to stand:  Mod I  Stand pivot:  Mod I  Car transfers: Mod I  Wheelchair mobility: NA  Floor transfers: NT      ASSESSMENT   63 year old female with diagnosis of CVA, L hemiparesis, L foot drop, chronic midline LBP, and gait disorder referred to [facility] received skilled outpatient PT 5/1/2018 to 9/28/2018. Pt made good progress with PT demonstrating improved ankle ROM, improved LE strength and endurance, improved LLE motor control, and improved balance. Pt met 5/6 STGs set. Pt is unable to independently perform all exercises provided as HEP due to decreased ability to maintain L knee flexion during these exercises. Pt met 2/5 LTGs set for improved L ankle ROM and improved balance/ gait quality. Pt demonstrated the following gait improvements: decreased L sided retraction throughout gait cycle, improved L knee control for decreasing genu recurvatum, improved stance time on L, improved consistent reciprocal pattern, and improved ability to attain terminal stance on L. Gait quality declines with increased speed. Pt missed several sessions and did not follow up when messages were left. Pt returned to therapy and informed OT that she had been hospitalized due to pneumonia and had also received HH PT after d/c. Pt will require new MD orders to return to outpatient PT due to the recent hospital admit and change in medical status. Current goal status obtained from last reassessment performed on 9/7/2018.    Status Towards Goals Met:     Status as of 9/7/18  Short term goals: 3-4 weeks, pt agrees to goals set.  1. Pt will perform HEP for basic strengthening and ROM with supervision to improve carryover of progress. Ongoing- pt reports difficulty with maintaining L knee  flexion during supine exercises  2. Pt will demonstrate improved L DF to 4 degrees to improve foot clearance to decreased LLE ER during gait. Met 6/1/18-   3. Pt will demonstrate improved gait and mobility by demonstrating TUG with SBQC in 15 sec. Met 6/1/18  4. Pt will demonstrate improved motor control and use of LLE for mobility by performing 5 times sit<>stand test in 10 sec with UE use as needed. Met 7/6/18- 9.6 sec with UE support  5. Pt will demonstrate improved mobility/ gait and decreased fall risk from high to moderate by scoring 19/28 on Tinetti Assessment. Met 6/1/18- 24/28  6. Make appropriate recommendations for DME as needed. Met 6/1/18     Long term goals: 6 weeks, pt agrees to goals set  7. Pt will demonstrate improved mobility and decreased fall risk by performing TUG with appropriate AD in 12 sec. Improved/ not met- 12.2 sec avg with SPC and L AFO  8. Pt will demonstrate improved L DF to 10 degrees to demonstrate improved resting position/ mobility of foot/ ankle and improve foot clearance during gait. Met 8/3/18- 12 deg AAROM  9. Pt will demonstrate improved mobility/ gait by scoring 22/28 on Tinetti Assessment. Met 6/1/18- 24/28  10. Pt will ambulate with appropriate AD for 150 ft without significant ER of LLE to demonstrate improved gait quality and reduce abnormal motions which may cause a secondary impairment of LBP. Improving/ not met- pt demonstrates improved control of LLE ER during gait, does not completely follow through in stance  11. Pt will demonstrate improved gait velocity to 1.14 m/s with appropraite AD to demonstrate a 25% improvement in gait. Same speed but decreased AD- 0.92m/s with SPC     Goals Not achieved and why: unexpected d/c    Discharge reason : Hospital admission    PLAN   This patient is discharged from Physical Therapy Services.         Monica Mosley, PT  10/31/2018

## 2018-11-09 ENCOUNTER — TELEPHONE (OUTPATIENT)
Dept: PHYSICAL MEDICINE AND REHAB | Facility: CLINIC | Age: 63
End: 2018-11-09

## 2018-11-09 DIAGNOSIS — M21.372 LEFT FOOT DROP: ICD-10-CM

## 2018-11-09 DIAGNOSIS — G81.94 LEFT HEMIPARESIS: ICD-10-CM

## 2018-11-09 DIAGNOSIS — Z86.73 HISTORY OF COMPLETED STROKE: Primary | ICD-10-CM

## 2018-11-09 DIAGNOSIS — Z74.09 IMPAIRED FUNCTIONAL MOBILITY, BALANCE, GAIT, AND ENDURANCE: ICD-10-CM

## 2018-11-09 DIAGNOSIS — R26.9 GAIT DISORDER: ICD-10-CM

## 2018-11-09 DIAGNOSIS — M54.41 CHRONIC MIDLINE LOW BACK PAIN WITH RIGHT-SIDED SCIATICA: ICD-10-CM

## 2018-11-09 DIAGNOSIS — G89.29 CHRONIC MIDLINE LOW BACK PAIN WITH RIGHT-SIDED SCIATICA: ICD-10-CM

## 2018-11-09 NOTE — TELEPHONE ENCOUNTER
Patient was informed.      ----- Message from Mitch Pedro sent at 11/9/2018 12:40 PM CST -----  Patient Requesting Order    Order Needed: Pt states [facility] needs new orders for PT/OT, due to pt missing PT/OT bc she was in the hospital  Communication Preference: [phone]  Additional Information: Pt is asking for a call back once orders are in so she can schedule the PT/OT

## 2018-11-26 ENCOUNTER — OFFICE VISIT (OUTPATIENT)
Dept: PHYSICAL MEDICINE AND REHAB | Facility: CLINIC | Age: 63
End: 2018-11-26
Payer: MEDICARE

## 2018-11-26 VITALS
WEIGHT: 209.44 LBS | HEIGHT: 65 IN | HEART RATE: 66 BPM | BODY MASS INDEX: 34.89 KG/M2 | SYSTOLIC BLOOD PRESSURE: 127 MMHG | DIASTOLIC BLOOD PRESSURE: 82 MMHG

## 2018-11-26 DIAGNOSIS — M72.2 PLANTAR FASCIITIS: ICD-10-CM

## 2018-11-26 DIAGNOSIS — Z86.73 HISTORY OF COMPLETED STROKE: Primary | ICD-10-CM

## 2018-11-26 DIAGNOSIS — M21.372 LEFT FOOT DROP: ICD-10-CM

## 2018-11-26 DIAGNOSIS — M20.21 HALLUX RIGIDUS OF RIGHT FOOT: ICD-10-CM

## 2018-11-26 DIAGNOSIS — M25.511 ACUTE PAIN OF RIGHT SHOULDER: ICD-10-CM

## 2018-11-26 DIAGNOSIS — R26.9 GAIT DISORDER: ICD-10-CM

## 2018-11-26 DIAGNOSIS — G81.94 LEFT HEMIPARESIS: ICD-10-CM

## 2018-11-26 PROCEDURE — 3008F BODY MASS INDEX DOCD: CPT | Mod: CPTII,HCNC,S$GLB, | Performed by: PHYSICAL MEDICINE & REHABILITATION

## 2018-11-26 PROCEDURE — 99999 PR PBB SHADOW E&M-EST. PATIENT-LVL III: CPT | Mod: PBBFAC,HCNC,, | Performed by: PHYSICAL MEDICINE & REHABILITATION

## 2018-11-26 PROCEDURE — 99214 OFFICE O/P EST MOD 30 MIN: CPT | Mod: HCNC,S$GLB,, | Performed by: PHYSICAL MEDICINE & REHABILITATION

## 2018-11-26 RX ORDER — DICLOFENAC SODIUM 10 MG/G
2 GEL TOPICAL 3 TIMES DAILY
Qty: 300 G | Refills: 3 | Status: SHIPPED | OUTPATIENT
Start: 2018-11-26 | End: 2019-09-27 | Stop reason: SDUPTHER

## 2018-11-26 NOTE — PROGRESS NOTES
Subjective:       Patient ID: Lacey Montana is a 63 y.o. female.    Chief Complaint: No chief complaint on file.    HPI     HISTORY OF PRESENT ILLNESS:  Ms. Montana is a 63-year-old black female with past   medical history of hypertension, stroke with left hemiparesis in 2003, left   foot drop, and chronic low back pain with lumbar radiculopathy.  She is followed   up in the [facility] for help with her mobility and activities of   daily living.  Her last visit was on 04/20/2018.  She was given a prescription   for a Rollator walker and a Versamode.    The patient is coming to the clinic for followup.  She has been medically stable   except for an admission for pneumonia a few weeks ago.  She continues to follow   up at [facility].  The patient continues to be independent with her   self-care.  She can ambulate with a Rollator walker around the house and   outside.  She still feel a little unsteady.  She started getting physical   therapy, but had to stop it due to pneumonia.  She is going to restart on   12/05/2018.  She denies any falls or syncopal episodes.  She continues to use a   left articulating ankle-foot orthosis.  However, it has worn out.  It dates from   2005.  She has history of low back pain, but she has not had any significant   problems since her last visit.      MS/HN  dd: 11/26/2018 13:54:11 (CST)  td: 11/27/2018 03:15:28 (CST)  Doc ID   #[id number]  Job ID #[id number]    CC:           Review of Systems   Eyes: Negative for visual disturbance.   Respiratory: Positive for shortness of breath.    Cardiovascular: Negative for chest pain.   Gastrointestinal: Positive for nausea. Negative for constipation and vomiting.   Genitourinary: Negative for difficulty urinating.   Musculoskeletal: Positive for arthralgias (shoulders), gait problem and neck pain. Negative for back pain.   Skin: Positive for rash.   Neurological: Positive for headaches. Negative for dizziness.    Psychiatric/Behavioral: Negative for behavioral problems.       Objective:      Physical Exam   Constitutional: She is oriented to person, place, and time. She appears well-developed and well-nourished. No distress.   HENT:   Head: Normocephalic and atraumatic.   Neck: Normal range of motion.   Musculoskeletal:   BUE:  Increased tone in LUE.  Strength:    RUE: 5/5 at shoulder abduction, 5 elbow flexion, 5 elbow extension, 5 hand .   LUE: 2/5 at shoulder abduction, 3 elbow flexion, 3 elbow extension, 3+ hand .  Sensation to pinprick:   RUE: intact.   LUE: intact.    Impingement Signs RUE:  Neer:  -ve  Rizo: +ve   Empty Can test: +ve  +ve deltoid and upper trapezius tenderness.    BLE:  Increased tone in LLE.  + ve bilateral knee crepitus.   Wearing articulated Lt AFO.  Strength:    RLE: 5/5 at hip flexion, 5 knee extension, 5 ankle DF/PF.   LLE: 2-/5 at hip flexion, 4 knee extension, 3 ankle DF/PF.  Sensation to pinprick:     RLE: intact.      LLE: intact.         Gait: slow sammy, spastic, some LLE shuffling, wearing a Lt AFO.   Neurological: She is alert and oriented to person, place, and time.   Skin: Skin is warm.   Psychiatric: She has a normal mood and affect.   Vitals reviewed.      Assessment:       1. History of completed stroke    2. Left hemiparesis    3. Left foot drop    4. Gait disorder    5. Plantar fasciitis    6. Hallux rigidus of right foot    7. Acute pain of right shoulder        Summary/Plan:       - A prescription for a replacement  Left Ankle Foot Orthosis was given to the patient to help improve gait safety.  - Start diclofenac sodium (VOLTAREN) 1 % Gel; Apply 2 g topically 3 (three) times daily to Rt shoulder as needed.  - Restart Physical therapy, followed by a regular home exercise program.  - Follow-up in about 6 months (around 5/26/2019).     This was a 25 minute visit, more than 50% of which was spent counseling the patient about the diagnosis and the treatment plan.

## 2018-12-04 ENCOUNTER — CLINICAL SUPPORT (OUTPATIENT)
Dept: REHABILITATION | Facility: HOSPITAL | Age: 63
End: 2018-12-04
Attending: PHYSICAL MEDICINE & REHABILITATION
Payer: MEDICARE

## 2018-12-04 DIAGNOSIS — R53.1 LEFT-SIDED WEAKNESS: ICD-10-CM

## 2018-12-04 DIAGNOSIS — G81.94 LEFT HEMIPARESIS: Primary | ICD-10-CM

## 2018-12-04 DIAGNOSIS — Z74.09 IMPAIRED FUNCTIONAL MOBILITY, BALANCE, GAIT, AND ENDURANCE: ICD-10-CM

## 2018-12-04 DIAGNOSIS — Z78.9 IMPAIRED MOBILITY AND ADLS: ICD-10-CM

## 2018-12-04 DIAGNOSIS — Z74.09 IMPAIRED MOBILITY AND ADLS: ICD-10-CM

## 2018-12-04 PROCEDURE — G8978 MOBILITY CURRENT STATUS: HCPCS | Mod: CK,HCNC,PO

## 2018-12-04 PROCEDURE — G8979 MOBILITY GOAL STATUS: HCPCS | Mod: CK,HCNC,PO

## 2018-12-04 PROCEDURE — G8984 CARRY CURRENT STATUS: HCPCS | Mod: CM,HCNC,PO

## 2018-12-04 PROCEDURE — G8985 CARRY GOAL STATUS: HCPCS | Mod: CL,HCNC,PO

## 2018-12-04 PROCEDURE — 97161 PT EVAL LOW COMPLEX 20 MIN: CPT | Mod: HCNC,PO

## 2018-12-04 PROCEDURE — 97165 OT EVAL LOW COMPLEX 30 MIN: CPT | Mod: HCNC,PO

## 2018-12-04 NOTE — PROGRESS NOTES
Occupational Therapy Initial Evaluation - Outpatient Neuro       Name: Lacey Montana  Clinic Number: [id number]     Therapy Diagnosis:        Encounter Diagnoses   Name Primary?    Muscle weakness of left upper extremity      Impaired activities of daily living      Decreased range of motion of left shoulder        Physician: Juan Pablo Anaya MD     Physician Orders: Evaluate and treat   Medical Diagnosis: History of stroke, recent hospitalization for PNA  Evaluation Date: 12/04/2018  Insurance Authorization Period Expiration: 11/09/2019  Plan of Care Certification Period: 1/15/2019  Visit # / Visits authorized: 1/30  Date of Return to MD: none scheduled yet      Time In:13:00  Time Out: 13:45  Total Billable Time: 45 minutes     Precautions: Standard and fall   Onset date: R CVA in April 2003     Subjective:   Patient goals: To improve use of her LUE in daily tasks and to feel safe when taking a shower  Chief Complaint: Left hemiparesis  Pain: 2/10; achy pain in R shoulder that patient reports started last month when she was moving a mattress/boxspring from her living room to her bedroom by herself    Social Hx: Pt lives alone but frequently baby sits her grandchildren ages 2,3, and 5  DME: Rollator walker, shower chair, AFO from 2005 (has new order for AFO), grab bar on L side of shower, L RH splint. Patient given order form for L jean gate splint  Home access: Pt lives in second floor apartment with elevator access. There are no steps insider her apartment      Past treatment includes: OP PT and OT. OT d/c 9/28/18 with hospitalization for PNA. She had completed 15 OT visits at that time.      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  Driving status: Patient has never driven     Objective  Cognitive Exam  Oriented: Person, Place, Time and  Situation  Behaviors: Follows multistep  commands  Follows Commands/attention: Follows multistep commands  Communication: clear/fluent  Memory: No Deficits noted  Safety awareness/insight to disability: Aware of diagnosis/disability   Coping skills/emotional control: Appropriate to situation     Visual/perceptual:  Tracking: intact  Saccades: intact  Acuity: Pt wears glasses  R/L discrimination: intact  Visual field: intact  Motor Planning Praxis: intact     Physical Exam:     Postural examination/scapula alignment: Rounded shoulders   Joint integrity: Subluxation of L humerus, 1/2 finger   Skin integrity: Intact  Edema: mild in her LUE      Sensation: Lacey reports that more sensation has returned to her LUE within the last year     Joint Evaluation AROM   PROM     Left Right Left   Shoulder flex 0-180 56 WFL throughout 140   Shoulder Abd 0-180 68   140   Shoulder ER 0-90 0   0   Shoulder IR 0-90 90   90   Shoulder Extension 0-80 25   70   Elbow flex/ext 0-150 0-100   0-140   Wrist flex 0-80 5   75   Wrist ext 0-70 5   50   Supination 0-80 45   WFL   Pronation 0-80 WFL   WFL   Comments: Shoulder flexion with abduction synergy    Strength: Not testable with MMT due to spasticity.      Strength: unable to test LUE with dynamometer due inability to actively grasp.  Fine motor coordination: unable to test on LUE due to inability to actively extend digits and cannot move individual fingers.      Tone:  Modified Kaylen Scale:   1-  Slight increase in muscle tone, manifested by a catch and release or by minimal resistance at the end of the range of motion when the affected part(s) is moved in flexion or extension     Balance:   Static Sit - WNL  Dynamic sit - WNL  Static Stand - Fair  Dynamic stand - Poor     Functional Mobility:  Bed mobility: Mod I  Roll to left: Mod I  Roll to right: Mod I  Supine to sit: Mod I  Sit to supine: Mod I  Transfers to bed: Mod I  Transfers to toilet: Mod I  Car transfers: Mod  I     ADL's:   Feeding: Mod I    Grooming: Mod I  Hygiene: Mod I  UB Dressing: Mod I  LB Dressing: Mod I  Toileting: Mod I  Bathing: Mod I; patient reports that she katerin herself before entering shower to minimize amount of time she is in there due to concerns over falling     IADL's: Using hemiplegic technique  Homecare: Mod I  Cooking: Mod I  Laundry: Mod I  Yard work: N/A   Use of telephone: Mod I  Money management: I  Medication management: I  Comments:  Pt performs all tasks with her R hand      Assessment  OT diagnosis: L hemiparesis, decreased fine motor coordination and L arm stiffness    Lacey Montana is a 62 y.o. female with a medical diagnosis of R CVA with left hemiparesis referred to occupational therapy for evaluate and treat. She presents with decreased strength and ROM in her LUE due to spastic hemiplegia impairing her ability to complete various self-care and IADLs tasks such as cooking, shopping, and community mobility     Lacey can benefit from outpatient Occupational therapy and a home program to address the stated goals to improve impairments and functional limitations. Treatment will be directed at improve the following impairments. Rehab potential is good.     PROBLEM LIST/IMPAIRMENTS:   decreased flexibility, decreased range of motion, decreased muscle strength, and impaired function of LUE .      Anticipated Barriers for therapy: stroke was over ten years ago    Medical Necessity is demonstrated by the following  Profile and History Assessment of Occupational Performance Level of Clinical Decision Making Complexity Score   Occupational Profile:   Lacey Montana is a 62 y/o F who lives alone and is currently on disability . Lacey  has difficulty with  bathing  shopping, housework/household chores and cooking  affecting her daily functional abilities. Her main goal for therapy is incorporating her RUE in daily activities.     Comorbidities:   HTN    Medical and Therapy History  Review:   Expanded               Performance Deficits    Physical:  Muscle Power/Strength  Edema   Strength  Gross Motor Coordination  Fine Motor Coordination  Tactile Functions  Muscle Tone    Cognitive:  No Deficits    Psychosocial:    No Deficits     Clinical Decision Making:  low    Assessment Process:  Detailed Assessments    Modification/Need for Assistance:  Minimal-Moderate Modifications/Assistance    Intervention Selection:  Several Treatment Options       low  Based on PMHX, co morbidities , data from assessments and functional level of assistance required with task and clinical presentation directly impacting function.       The following goals were discussed with the patient and patient is in agreement with them as to be addressed in the treatment plan.     LTG GOALS:  Time frame: 6 WEEKS  Mod I bathing safely on shower chair  I in HEP to prevent overuse in R Shoulder.  Demonstrate knowledge of Energy Conservation and work simplification during ADL and IADL tasks    STG Goals:  Time frame: 3 WEEKS  Lacey will be independent with HEP to improve ROM and normalize tone in LUE and ROM /strength R shoulder.   Lacey will safely weight bear through L hand during kitchen and homecare tasks.  Verbalize knowledge of Energy Conservation and work simplification during ADL and IADL tasks    Plan   Certification Period/Plan of care expiration: 12/04/2018 to 1/15/2019.    Outpatient Occupational Therapy 2 times weekly for 6 weeks to include the following interventions: Manual Therapy, Neuromuscular Re-ed, Orthotic Management and Training, Patient Education, Self Care, Therapeutic Activites and Therapeutic Exercise. NEEDS TO DO BEKAH Bermeo OT/S    I certify that I was present in the room directing the student in service delivery and guiding them using my skilled judgment. As the co-signing therapist I have reviewed the students documentation and am responsible for the treatment, assessment, and  plan.     Cris Sandoval, OT

## 2018-12-04 NOTE — PROGRESS NOTES
Physical Therapy Evaluation    Name: Lacey Montana  Clinic Number: [id number]      Diagnosis:   Encounter Diagnoses   Name Primary?    Left-sided weakness     Impaired functional mobility, balance, gait, and endurance      Physician: Juan Pablo Anaya MD  Treatment Orders: PT Eval and Treat    Past Medical History:   Diagnosis Date    Anticoagulant long-term use     Encounter for blood transfusion     GERD (gastroesophageal reflux disease)     Seizures     Stroke      Current Outpatient Medications   Medication Sig    diclofenac sodium (VOLTAREN) 1 % Gel Apply 2 g topically 3 (three) times daily.    dipyridamole-aspirin 200-25 mg (AGGRENOX)  mg CM12 Take 1 capsule by mouth 2 (two) times daily.    EPINEPHrine (EPIPEN) 0.3 mg/0.3 mL AtIn Inject 0.3 mLs (0.3 mg total) into the muscle once.    FLUARIX QUAD 4030-9331, PF, 60 mcg (15 mcg x 4)/0.5 mL vaccine inject 0.5 milliliter intramuscularly    hydrochlorothiazide (HYDRODIURIL) 25 MG tablet Take 1 tablet (25 mg total) by mouth once daily.    levetiracetam (KEPPRA) 1000 MG tablet Take 1.5 tablets (1,500 mg total) by mouth 2 (two) times daily.    PNEUMOVAX 23 25 mcg/0.5 mL Syrg inject 0.5 milliliter intramuscularly    topiramate (TOPAMAX) 200 MG Tab Take 1 tablet (200 mg total) by mouth 2 (two) times daily.    topiramate (TOPAMAX) 200 MG Tab TAKE 1 TABLET BY ORAL ROUTE 2 TIMES PER DAY TAKE 1 TABLET BY MOUTH TWICE A DAY    triamcinolone acetonide 0.1% (KENALOG) 0.1 % cream Apply topically 2 (two) times daily.     No current facility-administered medications for this visit.      Review of patient's allergies indicates:   Allergen Reactions    Chocolate flavor     Codeine Rash    Percocet [oxycodone-acetaminophen] Rash     Precautions: possible stress-induced seizures, standard    Evaluation Date: 12/4/18  Visit # authorized: 30  Authorization period: 11/9/19    Idania Rahman is a 63 y.o. female that presents to [facility] outpatient  clinic secondary to L hemiparesis, L foot drop, gait disorder, history of completed stroke.     Last seizure about 3 months ago  No recent falls.     Patient c/o: difficulty walking, L sided weakness, decreased mobility  Onset: aneurysm in  with craniotomy and  cranioplasty, recent pneumonia Oct 5-2018 with no significant changes in status  Pain Scale: no current pain but R arm is sore from moving a twin bed mattress and box spring  Previous treatment: PT in past with improvements  Past surgical history: craniotomy in , cranioplasty in   Functional deficits: prolonged standing, walking long distances, household chores  Prior level of function: independent with all ADLs  Occupation: not employed  Environment: 1 story apt on 2nd floor of building with elevator, has rollator (currently using) and SPC, lives alone  No cultural or spiritual barriers identified to treatment or learning.  Patient's goals: improve walking    Objective     Observation: pleasant and cooperative    Posture Alignment :WNL  Dominant hand:  right     ROM:   UPPER EXTREMITY--AROM/PROM  (R) UE: WNLs  (L) UE: limited as follows: 50% in all planes     Lower Extremity Strength  Right LE  Left LE    Hip flexion:  2/5 Hip flexion: 4/5   Knee extension: 5/5 Knee extension: 4+/5   Knee flexion: 5/5 Knee flexion: 2/5   Ankle dorsiflexion:  5/5 Ankle dorsiflexion: 2-/5   Ankle plantarflexion:  5/5 Ankle plantarflexion: trace   Hip abduction: 4/5 Hip abduction: 2/5   Hip adduction: 5/5 Hip adduction 3-/5   Hip extension: 3+/5 Hip extension: trace     Functional Strength:   -30 sec sit to stand: 10 (wt shifted to R LE)    Bed mobility: min A with all, mod A for rolling to prone    Transfers: sit <> stand from standard height with no UE support but wt shifted to R     Stairs: ascend/descend 5 steps with 1 HRs, reciprocal gait pattern, and CGA    FREITAS Assessment  1. Sitting to Standin - able to stand without using hands and stabilize  independently  2. Standing Unsupported: 4 - able to stand safely 2 minutes without hold  3. Sitting Unsupported: 4 - able to sit safely and securely 2 minutes  4. Standing to Sittin - sits safely with minimal use of hands  5. Pivot Transfer: 4 - able to transfer safely with minor use of hands  6. Standing with Eyes Closed: 4 - able to stand 10 seconds safely  7. Standing with Feet Together: 4 - able to place feet together independently and stand 1 minute safely  8. Reaching Forward with Outstretched Arm: 4 - can reach forward confidently 25 cm/10 inches  9. Retrieving Object from Floor: 4 - able to  slipper safely and easily  10. Turning to Look Behind: 4 - looks behind from both sides and weights shifts well  11. Turning 360 Degrees: 2 - able to turn 360 safely but slowly  12. Placing Alternate Foot on Step: 0 - needs assist to keep from falling/unable to try  13. Standing with One Foot in Front: 2 - able to take small step independently and hold 30 seconds  14. Standing on One Foot: 1 - tries to lift leg and unable to hold 3 seconds but remains standing independently  Total: 45  Maximum: 56    Gait Assessment:   · AD used: SPC and L AFO; Assistance: SBA; Distance: 50'    · GAIT DEVIATIONS:   Lacey displays the following deviations with ambulation:    Impairments contributing to deviations: impaired motor control, decreased L heel strike and toe off, L circumduction and decreased knee flexion, decreased R step length    Functional Limitations Reports - G Codes  Current - 40-60% Impairment CK []  Goal - 40-60% Impairment CK []    PT Evaluation Completed? Yes  Discussed Plan of Care with patient: Yes    Assessment     This is a 63 y.o. female referred to outpatient physical therapy and presents with a medical diagnosis of L hemiparesis, L foot drop, gait disorder, history of completed stroke and demonstrates limitations as described in the problem list. Pt presents to clinic with L sided  weakness, decreased L UE AROM, and impaired gait, balance, endurance, and functional mobility. Wise balance score 45/56 and 30 sec sit to stand score 10. Pt will benefit from physcial therapy services in order to maximize functional independence. The following goals were discussed with the patient and patient is in agreement with them as to be addressed in the treatment plan.     History  Co-morbidities and personal factors that may impact the plan of care Examination  Body Structures and Functions, activity limitations and participation restrictions that may impact the plan of care Clinical Presentation   Decision Making/ Complexity Score   Co-morbidities:     Past stroke    Personal Factors:     None Body Regions: B LEs    Body Systems: musculoskeletal (LE weakness)    Neuromuscular (impaired gait, balance, endurance, and functional mobility)    Activity limitations: prolonged standing, walking long distances, household chores    Participation Restrictions: ADLs, IADLs, domestic duties   Stable and predictable   Low     Prognosis: fair to good    Anticipated barriers to physical therapy: none    Medical necessity is demonstrated by the following IMPAIRMENTS/PROBLEM LIST:   1) Impaired functional mobility   2) LE weakness   3) Impaired gait   4) Difficulty walking long distances   5) Lack of HEP    GOALS: Short Term Goals:  6 weeks  1. Pt will be mod I with all bed mobility to indicate improved functional mobility.   2. Pt will be able to tolerate multi-directional LE strengthening in order to improve ability to perform household chores.  3. Pt will ambulate 100' with LRAD, increased L heel strike, and increased knee flexion to improve gait mechanics.  4. Pt will report 50% improvement in ability to walk long distances since start of care to indicate improved functional mobility.   5. Pt to tolerate HEP to improve ROM and independence with ADL's    Long Term Goals: 12 weeks  1. Pt will perform 20 sit to stands with  equal weight bearing to indicate improved transfers.   2. Pt will be able to perform 2 x 10 multi-directional LE strengthening without fatigue in order to improve ability to perform household chores.  3. Pt will report 80% improvement in ability to walk long distances since start of care to indicate improved functional mobility.   4. Pt will ambulate 200' with LRAD and increased R step length to improve gait mechanics.  5. Pt to be Independent with HEP to improve ROM and independence with ADL's    Plan     Pt will be treated by physical therapy 1-2 times a week for 12 weeks for Pt Education, HEP, therapeutic exercises, neuromuscular re-education, joint mobilizations, modalities prn to achieve established goals. Lacey may at times be seen by a PTA as part of the Rehab Team. Cont PT for 12 weeks.     I certify the need for these services furnished under this plan of treatment and while under my care.______________________________ Physician/Referring Practitioner  Date of Signature

## 2018-12-05 NOTE — PLAN OF CARE
Physical Therapy Evaluation    Name: Lacey Montana  Clinic Number: [id number]      Diagnosis:   Encounter Diagnoses   Name Primary?    Left-sided weakness     Impaired functional mobility, balance, gait, and endurance      Physician: Juan Pablo Anaya MD  Treatment Orders: PT Eval and Treat    Past Medical History:   Diagnosis Date    Anticoagulant long-term use     Encounter for blood transfusion     GERD (gastroesophageal reflux disease)     Seizures     Stroke      Current Outpatient Medications   Medication Sig    diclofenac sodium (VOLTAREN) 1 % Gel Apply 2 g topically 3 (three) times daily.    dipyridamole-aspirin 200-25 mg (AGGRENOX)  mg CM12 Take 1 capsule by mouth 2 (two) times daily.    EPINEPHrine (EPIPEN) 0.3 mg/0.3 mL AtIn Inject 0.3 mLs (0.3 mg total) into the muscle once.    FLUARIX QUAD [id number], PF, 60 mcg (15 mcg x 4)/0.5 mL vaccine inject 0.5 milliliter intramuscularly    hydrochlorothiazide (HYDRODIURIL) 25 MG tablet Take 1 tablet (25 mg total) by mouth once daily.    levetiracetam (KEPPRA) 1000 MG tablet Take 1.5 tablets (1,500 mg total) by mouth 2 (two) times daily.    PNEUMOVAX 23 25 mcg/0.5 mL Syrg inject 0.5 milliliter intramuscularly    topiramate (TOPAMAX) 200 MG Tab Take 1 tablet (200 mg total) by mouth 2 (two) times daily.    topiramate (TOPAMAX) 200 MG Tab TAKE 1 TABLET BY ORAL ROUTE 2 TIMES PER DAY TAKE 1 TABLET BY MOUTH TWICE A DAY    triamcinolone acetonide 0.1% (KENALOG) 0.1 % cream Apply topically 2 (two) times daily.     No current facility-administered medications for this visit.      Review of patient's allergies indicates:   Allergen Reactions    Chocolate flavor     Codeine Rash    Percocet [oxycodone-acetaminophen] Rash     Precautions: possible stress-induced seizures, standard    Evaluation Date: 12/4/18  Visit # authorized: 30  Authorization period: 11/9/19    Idania Rahman is a 63 y.o. female that presents to [facility] outpatient  clinic secondary to L hemiparesis, L foot drop, gait disorder, history of completed stroke.     Last seizure about 3 months ago  No recent falls.     Patient c/o: difficulty walking, L sided weakness, decreased mobility  Onset: aneurysm in  with craniotomy and  cranioplasty, recent pneumonia Oct 5-2018 with no significant changes in status  Pain Scale: no current pain but R arm is sore from moving a twin bed mattress and box spring  Previous treatment: PT in past with improvements  Past surgical history: craniotomy in , cranioplasty in   Functional deficits: prolonged standing, walking long distances, household chores  Prior level of function: independent with all ADLs  Occupation: not employed  Environment: 1 story apt on 2nd floor of building with elevator, has rollator (currently using) and SPC, lives alone  No cultural or spiritual barriers identified to treatment or learning.  Patient's goals: improve walking    Objective     Observation: pleasant and cooperative    Posture Alignment :WNL  Dominant hand:  right     ROM:   UPPER EXTREMITY--AROM/PROM  (R) UE: WNLs  (L) UE: limited as follows: 50% in all planes     Lower Extremity Strength  Right LE  Left LE    Hip flexion:  2/5 Hip flexion: 4/5   Knee extension: 5/5 Knee extension: 4+/5   Knee flexion: 5/5 Knee flexion: 2/5   Ankle dorsiflexion:  5/5 Ankle dorsiflexion: 2-/5   Ankle plantarflexion:  5/5 Ankle plantarflexion: trace   Hip abduction: 4/5 Hip abduction: 2/5   Hip adduction: 5/5 Hip adduction 3-/5   Hip extension: 3+/5 Hip extension: trace     Functional Strength:   -30 sec sit to stand: 10 (wt shifted to R LE)    Bed mobility: min A with all, mod A for rolling to prone    Transfers: sit <> stand from standard height with no UE support but wt shifted to R     Stairs: ascend/descend 5 steps with 1 HRs, reciprocal gait pattern, and CGA    FREITAS Assessment  1. Sitting to Standin - able to stand without using hands and stabilize  independently  2. Standing Unsupported: 4 - able to stand safely 2 minutes without hold  3. Sitting Unsupported: 4 - able to sit safely and securely 2 minutes  4. Standing to Sittin - sits safely with minimal use of hands  5. Pivot Transfer: 4 - able to transfer safely with minor use of hands  6. Standing with Eyes Closed: 4 - able to stand 10 seconds safely  7. Standing with Feet Together: 4 - able to place feet together independently and stand 1 minute safely  8. Reaching Forward with Outstretched Arm: 4 - can reach forward confidently 25 cm/10 inches  9. Retrieving Object from Floor: 4 - able to  slipper safely and easily  10. Turning to Look Behind: 4 - looks behind from both sides and weights shifts well  11. Turning 360 Degrees: 2 - able to turn 360 safely but slowly  12. Placing Alternate Foot on Step: 0 - needs assist to keep from falling/unable to try  13. Standing with One Foot in Front: 2 - able to take small step independently and hold 30 seconds  14. Standing on One Foot: 1 - tries to lift leg and unable to hold 3 seconds but remains standing independently  Total: 45  Maximum: 56    Gait Assessment:   · AD used: SPC and L AFO; Assistance: SBA; Distance: 50'    · GAIT DEVIATIONS:   Lacey displays the following deviations with ambulation:    Impairments contributing to deviations: impaired motor control, decreased L heel strike and toe off, L circumduction and decreased knee flexion, decreased R step length    Functional Limitations Reports - G Codes  Current - 40-60% Impairment CK []  Goal - 40-60% Impairment CK []    PT Evaluation Completed? Yes  Discussed Plan of Care with patient: Yes    Assessment     This is a 63 y.o. female referred to outpatient physical therapy and presents with a medical diagnosis of L hemiparesis, L foot drop, gait disorder, history of completed stroke and demonstrates limitations as described in the problem list. Pt presents to clinic with L sided  weakness, decreased L UE AROM, and impaired gait, balance, endurance, and functional mobility. Wise balance score 45/56 and 30 sec sit to stand score 10. Pt will benefit from physcial therapy services in order to maximize functional independence. The following goals were discussed with the patient and patient is in agreement with them as to be addressed in the treatment plan.     History  Co-morbidities and personal factors that may impact the plan of care Examination  Body Structures and Functions, activity limitations and participation restrictions that may impact the plan of care Clinical Presentation   Decision Making/ Complexity Score   Co-morbidities:     Past stroke    Personal Factors:     None Body Regions: B LEs    Body Systems: musculoskeletal (LE weakness)    Neuromuscular (impaired gait, balance, endurance, and functional mobility)    Activity limitations: prolonged standing, walking long distances, household chores    Participation Restrictions: ADLs, IADLs, domestic duties   Stable and predictable   Low     Prognosis: fair to good    Anticipated barriers to physical therapy: none    Medical necessity is demonstrated by the following IMPAIRMENTS/PROBLEM LIST:   1) Impaired functional mobility   2) LE weakness   3) Impaired gait   4) Difficulty walking long distances   5) Lack of HEP    GOALS: Short Term Goals:  6 weeks  1. Pt will be mod I with all bed mobility to indicate improved functional mobility.   2. Pt will be able to tolerate multi-directional LE strengthening in order to improve ability to perform household chores.  3. Pt will ambulate 100' with LRAD, increased L heel strike, and increased knee flexion to improve gait mechanics.  4. Pt will report 50% improvement in ability to walk long distances since start of care to indicate improved functional mobility.   5. Pt to tolerate HEP to improve ROM and independence with ADL's    Long Term Goals: 12 weeks  1. Pt will perform 20 sit to stands with  equal weight bearing to indicate improved transfers.   2. Pt will be able to perform 2 x 10 multi-directional LE strengthening without fatigue in order to improve ability to perform household chores.  3. Pt will report 80% improvement in ability to walk long distances since start of care to indicate improved functional mobility.   4. Pt will ambulate 200' with LRAD and increased R step length to improve gait mechanics.  5. Pt to be Independent with HEP to improve ROM and independence with ADL's    Plan     Pt will be treated by physical therapy 1-2 times a week for 12 weeks for Pt Education, HEP, therapeutic exercises, neuromuscular re-education, joint mobilizations, modalities prn to achieve established goals. Lacey may at times be seen by a PTA as part of the Rehab Team. Cont PT for 12 weeks.     I certify the need for these services furnished under this plan of treatment and while under my care.    Juan Pablo Anaya MD    Physician/Referring Practitioner     12/04/2018  Date of Signature

## 2018-12-06 NOTE — PLAN OF CARE
Create Note1 eval2 tx note3 vision eval4 LSVT                          My Note   12/04/2018             Edit                                                             Occupational Therapy Initial Evaluation - Outpatient Neuro             Name: Lacey Montana    Clinic Number: [id number]         Therapy Diagnosis:                             Encounter Diagnoses       Name     Primary?            Muscle weakness of left upper extremity                  Impaired activities of daily living                  Decreased range of motion of left shoulder                  Physician: Juan Pablo Anaya MD         Physician Orders: Evaluate and treat     Medical Diagnosis: History of stroke, recent hospitalization for PNA    Evaluation Date: 12/04/2018    Insurance Authorization Period Expiration: 11/09/2019    Plan of Care Certification Period: 1/15/2019    Visit # / Visits authorized: 1/30    Date of Return to MD: none scheduled yet          Time In:13:00    Time Out: 13:45    Total Billable Time: 45 minutes         Precautions: Standard and fall     Onset date: R CVA in April 2003         Subjective:     Patient goals: To improve use of her LUE in daily tasks and to feel safe when taking a shower    Chief Complaint: Left hemiparesis    Pain: 2/10; achy pain in R shoulder that patient reports started last month when she was moving a mattress/boxspring from her living room to her bedroom by herself         Social Hx: Pt lives alone but frequently baby sits her grandchildren ages 2,3, and 5    DME: Rollator walker, shower chair, AFO from 2005 (has new order for AFO), grab bar on L side of shower, L RH splint. Patient given order form for L jean gate splint    Home access: Pt lives in second floor apartment with elevator access. There are no steps insider her apartment          Past treatment includes: OP PT and OT. OT d/c 9/28/18 with hospitalization for PNA. She had  completed 15 OT visits at that time.          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    Driving status: Patient has never driven         Objective    Cognitive Exam    Oriented: Person, Place, Time and Situation    Behaviors: Follows multistep  commands    Follows Commands/attention: Follows multistep commands    Communication: clear/fluent    Memory: No Deficits noted    Safety awareness/insight to disability: Aware of diagnosis/disability     Coping skills/emotional control: Appropriate to situation         Visual/perceptual:    Tracking: intact    Saccades: intact    Acuity: Pt wears glasses    R/L discrimination: intact    Visual field: intact    Motor Planning Praxis: intact         Physical Exam:         Postural examination/scapula alignment: Rounded shoulders     Joint integrity: Subluxation of L humerus, 1/2 finger     Skin integrity: Intact    Edema: mild in her LUE          Sensation: Lacey reports that more sensation has returned to her LUE within the last year             Joint Evaluation     AROM           PROM             Left     Right     Left       Shoulder flex 0-180     56     WFL throughout     140       Shoulder Abd 0-180     68           140       Shoulder ER 0-90     0           0       Shoulder IR 0-90     90           90       Shoulder Extension 0-80     25           70       Elbow flex/ext 0-150     0-100           0-140       Wrist flex 0-80     5           75       Wrist ext 0-70     5           50       Supination 0-80     45           WFL       Pronation 0-80     WFL           WFL       Comments: Shoulder flexion with abduction synergy         Strength: Not testable with MMT due to spasticity.           Strength: unable to test LUE with dynamometer due inability to actively grasp.    Fine motor coordination: unable to test on  LUE due to inability to actively extend digits and cannot move individual fingers.          Tone:    Modified Kaylen Scale:     1-  Slight increase in muscle tone, manifested by a catch and release or by minimal resistance at the end of the range of motion when the affected part(s) is moved in flexion or extension         Balance:     Static Sit - WNL    Dynamic sit - WNL    Static Stand - Fair    Dynamic stand - Poor         Functional Mobility:    Bed mobility: Mod I    Roll to left: Mod I    Roll to right: Mod I    Supine to sit: Mod I    Sit to supine: Mod I    Transfers to bed: Mod I    Transfers to toilet: Mod I    Car transfers: Mod I         ADL's:     Feeding: Mod I      Grooming: Mod I    Hygiene: Mod I    UB Dressing: Mod I    LB Dressing: Mod I    Toileting: Mod I    Bathing: Mod I; patient reports that she katerin herself before entering shower to minimize amount of time she is in there due to concerns over falling         IADL's: Using hemiplegic technique    Homecare: Mod I    Cooking: Mod I    Laundry: Mod I    Yard work: N/A     Use of telephone: Mod I    Money management: I    Medication management: I    Comments:  Pt performs all tasks with her R hand          Assessment    OT diagnosis: L hemiparesis, decreased fine motor coordination and L arm stiffness         Lacey Montana is a 62 y.o. female with a medical diagnosis of R CVA with left hemiparesis referred to occupational therapy for evaluate and treat. She presents with decreased strength and ROM in her LUE due to spastic hemiplegia impairing her ability to complete various self-care and IADLs tasks such as cooking, shopping, and community mobility         Lacey can benefit from outpatient Occupational therapy and a home program to address the stated goals to improve impairments and functional limitations. Treatment will be directed at improve the following impairments. Rehab potential is good.         PROBLEM LIST/IMPAIRMENTS:      decreased flexibility, decreased range of motion, decreased muscle strength, and impaired function of LUE .              Anticipated Barriers for therapy: stroke was over ten years ago         Medical Necessity is demonstrated by the following      Profile and History     Assessment of Occupational Performance     Level of Clinical Decision Making     Complexity Score       Occupational Profile:     Lacey Montana is a 64 y/o F who lives alone and is currently on disability . Lacey  has difficulty with    bathing    shopping, housework/household chores and cooking    affecting her daily functional abilities. Her main goal for therapy is incorporating her RUE in daily activities.          Comorbidities:     HTN         Medical and Therapy History Review:     Expanded                                        Performance Deficits         Physical:    Muscle Power/Strength    Edema     Strength    Gross Motor Coordination    Fine Motor Coordination    Tactile Functions    Muscle Tone         Cognitive:    No Deficits         Psychosocial:      No Deficits               Clinical Decision Making:    low         Assessment Process:    Detailed Assessments         Modification/Need for Assistance:    Minimal-Moderate Modifications/Assistance         Intervention Selection:    Several Treatment Options                 low    Based on PMHX, co morbidities , data from assessments and functional level of assistance required with task and clinical presentation directly impacting function.              The following goals were discussed with the patient and patient is in agreement with them as to be addressed in the treatment plan.          LTG GOALS:  Time frame: 6 WEEKS    Mod I bathing safely on shower chair    I in HEP to prevent overuse in R Shoulder.    Demonstrate knowledge of Energy Conservation and work simplification during ADL and IADL tasks         STG Goals:  Time frame: 3 WEEKS    Lacey will be  independent with HEP to improve ROM and normalize tone in LUE and ROM /strength R shoulder.     Lacey will safely weight bear through L hand during kitchen and homecare tasks.    Verbalize knowledge of Energy Conservation and work simplification during ADL and IADL tasks           Plan       Certification Period/Plan of care expiration: 12/04/2018 to 1/15/2019.         Outpatient Occupational Therapy 2 times weekly for 6 weeks to include the following interventions: Manual Therapy, Neuromuscular Re-ed, Orthotic Management and Training, Patient Education, Self Care, Therapeutic Activites and Therapeutic Exercise. NEEDS TO DO BEKAH Bermeo OT/S         I certify that I was present in the room directing the student in service delivery and guiding them using my skilled judgment. As the co-signing therapist I have reviewed the students documentation and am responsible for the treatment, assessment, and plan.          Cris Sandoval OT                                Occupational Therapy Initial Evaluation - Outpatient Neuro       Name: Lacey Montana  Clinic Number: [id number]     Therapy Diagnosis:        Encounter Diagnoses   Name Primary?    Muscle weakness of left upper extremity      Impaired activities of daily living      Decreased range of motion of left shoulder        Physician: Juan Pablo Anaya MD     Physician Orders: Evaluate and treat   Medical Diagnosis: History of stroke, recent hospitalization for PNA  Evaluation Date: 12/04/2018  Insurance Authorization Period Expiration: 11/09/2019  Plan of Care Certification Period: 1/15/2019  Visit # / Visits authorized: 1/30  Date of Return to MD: none scheduled yet      Time In:13:00  Time Out: 13:45  Total Billable Time: 45 minutes     Precautions: Standard and fall   Onset date: R CVA in April 2003     Subjective:   Patient goals: To improve use of her LUE in daily tasks and to feel safe when taking a shower  Chief Complaint: Left  hemiparesis  Pain: 2/10; achy pain in R shoulder that patient reports started last month when she was moving a mattress/boxspring from her living room to her bedroom by herself    Social Hx: Pt lives alone but frequently baby sits her grandchildren ages 2,3, and 5  DME: Rollator walker, shower chair, AFO from 2005 (has new order for AFO), grab bar on L side of shower, L RH splint. Patient given order form for L jean gate splint  Home access: Pt lives in second floor apartment with elevator access. There are no steps insider her apartment      Past treatment includes: OP PT and OT. OT d/c 9/28/18 with hospitalization for PNA. She had completed 15 OT visits at that time.      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  Driving status: Patient has never driven     Objective  Cognitive Exam  Oriented: Person, Place, Time and Situation  Behaviors: Follows multistep  commands  Follows Commands/attention: Follows multistep commands  Communication: clear/fluent  Memory: No Deficits noted  Safety awareness/insight to disability: Aware of diagnosis/disability   Coping skills/emotional control: Appropriate to situation     Visual/perceptual:  Tracking: intact  Saccades: intact  Acuity: Pt wears glasses  R/L discrimination: intact  Visual field: intact  Motor Planning Praxis: intact     Physical Exam:     Postural examination/scapula alignment: Rounded shoulders   Joint integrity: Subluxation of L humerus, 1/2 finger   Skin integrity: Intact  Edema: mild in her LUE      Sensation: Lacey reports that more sensation has returned to her LUE within the last year     Joint Evaluation AROM   PROM     Left Right Left   Shoulder flex 0-180 56 WFL throughout 140   Shoulder Abd 0-180 68   140   Shoulder ER 0-90 0   0   Shoulder IR 0-90 90   90   Shoulder Extension 0-80 25   70   Elbow  flex/ext 0-150 0-100   0-140   Wrist flex 0-80 5   75   Wrist ext 0-70 5   50   Supination 0-80 45   WFL   Pronation 0-80 WFL   WFL   Comments: Shoulder flexion with abduction synergy    Strength: Not testable with MMT due to spasticity.      Strength: unable to test LUE with dynamometer due inability to actively grasp.  Fine motor coordination: unable to test on LUE due to inability to actively extend digits and cannot move individual fingers.      Tone:  Modified Kaylen Scale:   1-  Slight increase in muscle tone, manifested by a catch and release or by minimal resistance at the end of the range of motion when the affected part(s) is moved in flexion or extension     Balance:   Static Sit - WNL  Dynamic sit - WNL  Static Stand - Fair  Dynamic stand - Poor     Functional Mobility:  Bed mobility: Mod I  Roll to left: Mod I  Roll to right: Mod I  Supine to sit: Mod I  Sit to supine: Mod I  Transfers to bed: Mod I  Transfers to toilet: Mod I  Car transfers: Mod I     ADL's:   Feeding: Mod I    Grooming: Mod I  Hygiene: Mod I  UB Dressing: Mod I  LB Dressing: Mod I  Toileting: Mod I  Bathing: Mod I; patient reports that she katerin herself before entering shower to minimize amount of time she is in there due to concerns over falling     IADL's: Using hemiplegic technique  Homecare: Mod I  Cooking: Mod I  Laundry: Mod I  Yard work: N/A   Use of telephone: Mod I  Money management: I  Medication management: I  Comments:  Pt performs all tasks with her R hand      Assessment  OT diagnosis: L hemiparesis, decreased fine motor coordination and L arm stiffness    Lacey Montana is a 62 y.o. female with a medical diagnosis of R CVA with left hemiparesis referred to occupational therapy for evaluate and treat. She presents with decreased strength and ROM in her LUE due to spastic hemiplegia impairing her ability to complete various self-care and IADLs tasks such as cooking, shopping, and community  mobility     Lacey can benefit from outpatient Occupational therapy and a home program to address the stated goals to improve impairments and functional limitations. Treatment will be directed at improve the following impairments. Rehab potential is good.     PROBLEM LIST/IMPAIRMENTS:   decreased flexibility, decreased range of motion, decreased muscle strength, and impaired function of LUE .      Anticipated Barriers for therapy: stroke was over ten years ago    Medical Necessity is demonstrated by the following  Profile and History Assessment of Occupational Performance Level of Clinical Decision Making Complexity Score   Occupational Profile:   Lacey Montana is a 64 y/o F who lives alone and is currently on disability . Lacey  has difficulty with  bathing  shopping, housework/household chores and cooking  affecting her daily functional abilities. Her main goal for therapy is incorporating her RUE in daily activities.     Comorbidities:   HTN    Medical and Therapy History Review:   Expanded               Performance Deficits    Physical:  Muscle Power/Strength  Edema   Strength  Gross Motor Coordination  Fine Motor Coordination  Tactile Functions  Muscle Tone    Cognitive:  No Deficits    Psychosocial:    No Deficits     Clinical Decision Making:  low    Assessment Process:  Detailed Assessments    Modification/Need for Assistance:  Minimal-Moderate Modifications/Assistance    Intervention Selection:  Several Treatment Options       low  Based on PMHX, co morbidities , data from assessments and functional level of assistance required with task and clinical presentation directly impacting function.       The following goals were discussed with the patient and patient is in agreement with them as to be addressed in the treatment plan.     LTG GOALS:  Time frame: 6 WEEKS  Mod I bathing safely on shower chair  I in HEP to prevent overuse in R Shoulder.  Demonstrate knowledge of Energy Conservation and  work simplification during ADL and IADL tasks    STG Goals:  Time frame: 3 WEEKS  Lacey will be independent with HEP to improve ROM and normalize tone in LUE and ROM /strength R shoulder.   Lacey will safely weight bear through L hand during kitchen and homecare tasks.  Verbalize knowledge of Energy Conservation and work simplification during ADL and IADL tasks    Plan   Certification Period/Plan of care expiration: 12/04/2018 to 1/15/2019.    Outpatient Occupational Therapy 2 times weekly for 6 weeks to include the following interventions: Manual Therapy, Neuromuscular Re-ed, Orthotic Management and Training, Patient Education, Self Care, Therapeutic Activites and Therapeutic Exercise. NEEDS TO DO BEKAH Bermeo, OT/S    I certify that I was present in the room directing the student in service delivery and guiding them using my skilled judgment. As the co-signing therapist I have reviewed the students documentation and am responsible for the treatment, assessment, and plan.     Cris Sandoval, OT

## 2018-12-14 ENCOUNTER — CLINICAL SUPPORT (OUTPATIENT)
Dept: REHABILITATION | Facility: HOSPITAL | Age: 63
End: 2018-12-14
Attending: PHYSICAL MEDICINE & REHABILITATION
Payer: MEDICARE

## 2018-12-14 DIAGNOSIS — M62.81 MUSCLE WEAKNESS OF LEFT UPPER EXTREMITY: ICD-10-CM

## 2018-12-14 DIAGNOSIS — M25.612 DECREASED RANGE OF MOTION OF LEFT SHOULDER: Primary | ICD-10-CM

## 2018-12-14 PROCEDURE — G8988 SELF CARE GOAL STATUS: HCPCS | Mod: CK,HCNC,PO | Performed by: OPTOMETRIST

## 2018-12-14 PROCEDURE — 97530 THERAPEUTIC ACTIVITIES: CPT | Mod: HCNC,PO | Performed by: OPTOMETRIST

## 2018-12-14 PROCEDURE — G8987 SELF CARE CURRENT STATUS: HCPCS | Mod: CK,HCNC,PO | Performed by: OPTOMETRIST

## 2018-12-14 NOTE — PROGRESS NOTES
Occupational Therapy Daily Treatment Note     Date: 12/14/2018  Name: Lacey Montana  Clinic Number: [id number]    Therapy Diagnosis:   Encounter Diagnoses   Name Primary?    Decreased range of motion of left shoulder Yes    Muscle weakness of left upper extremity      Physician: Juan Pablo Anaya MD     Physician Orders: Evaluate and treat   Medical Diagnosis: History of stroke, recent hospitalization for PNA  Evaluation Date: 12/04/2018  Insurance Authorization Period Expiration: 11/09/2019  Plan of Care Certification Period: 1/15/2019  Visit # / Visits authorized: 1/6  Date of Return to MD: none scheduled yet     Time In: 1030  Time Out: 1115  Total Billable Time: 45 minutes    Precautions:  Standard      Subjective     Pt reports: That she was unable to make her earlier PT appt due to transportation issues.    Response to previous treatment: Pt is wearing her resting hand splint at home  Functional change: Pt still fatigues easily as she is recovering from pneumonia.     Pain: 0/10  Location: N/A    Objective     Lacey participated in dynamic functional therapeutic activities to improve functional performance for 45  minutes, including:  -- Mobilization of palm of left hand for stretching and metacarpal flexion and extension    - General wrist stretches including 1. Scaphoid on radius 2. Increasing mobility of metacarpals 3. Carpal rolls 4. Increasing mobility towards radial deviation 5. Increasing mobility of wrist towards extension 6. Increasing mobility of wrist towards supination/pronation  - Left hand placed on hard surface on mat next to Pt with digits and wrist in extension as Pt was given cues to lean forward to promote increased extension on their own  - Scapular mobilization for elevation, depression, adduction and protraction where AAROM was performed to Left side.  - Assisted shoulder flexion from sitting at EOM combined with reaching for targets in various planes    Pt completed FOTO:      CMS Impairment/Limitation/Restriction for FOTO Cerebrovascular Disorders Survey  Status Limitation G-Code CMS Severity Modifier  Intake 50% 50% Current Status CK - At least 40 percent but less than 60 percent  Predicted 54% 46% Goal Status+ CK - At least 40 percent but less than 60 percent  D/C Status CK **only report if this is a one time visit  +Based on FOTO predicted change score      Home Exercises and Education Provided     Education provided:   - Pt was asked to bring in her resting hand splint for adjustment and to make sure that she is applying it correctly.   - Progress towards goals - TBA       Assessment     Ms Montana remains limited in functional endurance as she needed increased rest breaks after ambulation and stated that she still feels tired. She is very determined to improve but is frustrated with her setback from her recent hospitalization.     Lacey is progressing well towards her goals and there are no updates to goals at this time. Pt prognosis is Good.     Pt will continue to benefit from skilled outpatient occupational therapy to address the deficits listed in the problem list on initial evaluation provide pt/family education and to maximize pt's level of independence in the home and community environment.     Anticipated barriers to occupational therapy: Transportation as she relies on cabs to bring her to therapy.     Pt's spiritual, cultural and educational needs considered and pt agreeable to plan of care and goals.    Goals:  LTG GOALS:  Time frame: 6 WEEKS  Mod I bathing safely on shower chair  I in HEP to prevent overuse in R Shoulder.  Demonstrate knowledge of Energy Conservation and work simplification during ADL and IADL tasks    STG Goals:  Time frame: 3 WEEKS  Lacey will be independent with HEP to improve ROM and normalize tone in LUE and ROM /strength R shoulder.   Lacey will safely weight bear through L hand during kitchen and homecare tasks.  Verbalize  knowledge of Energy Conservation and work simplification during ADL and IADL tasks    Plan     Certification Period/Plan of care expiration: 12/04/2018 to 1/15/2019.    Outpatient Occupational Therapy 2 times weekly for 6 weeks to include the following interventions: Manual Therapy, Neuromuscular Re-ed, Orthotic Management and Training, Patient Education, Self Care, Therapeutic Activities and Therapeutic Exercise.       IRIS Lopez   12/14/2018

## 2018-12-14 NOTE — PROGRESS NOTES
Name: Lacey Montana  Clinic Number: [id number]  Date of Treatment: 12/13/2018  Diagnosis: No diagnosis found.    Physician: Juan Pablo Anaya MD    Evaluation Date: 12/4/18  Visit # authorized :2 / 30  PTA Visit Number: 1  Authorization period: 11/9/19    Time in: 07:00 am   Time Out: 07:45 am   Total Treatment Time: 45 minutes   Billable Time: 30 minutes      Subjective:    Lacey Montana reports improvement of symptoms.  Patient reports their pain to be 0/10 on a 0-10 scale with 0 being no pain and 10 being the worst pain imaginable. Reports having a cold and battling a headache this morning. States sometimes she has difficulty with transportation and that is the reason she missed her last appointment.     Objective      Lacey Montana was instructed in and performed therapeutic exercises to develop strength, endurance, ROM, flexibility, posture and core stabilization for 45 minutes including:      Seated hip adduction: 20 x 5 second hold ball  Seated hip abduction: 20 x 5 second hold orange   Hip abduction on slider: x 2 minutes   Standing weight shifts: x 2 minutes   Standing toe taps on right le with therapist blocking lle to prevent buckling: 2 x 10     Gait training x 300 feet using mainor walker with cues for safety and sequencing        Written Home Exercises Provided:   Pt demo good understanding of the education provided. Lacey Montana demonstrated good return demonstration of activities.     Assessment:     Patient tolerated therapy session fairly well. Patient with significant loeft lower extremity weakness and decreased overall fucntional mobility and safety awareness. Will continue to benefit from skilled physical therapy to address deficits.   Pt will continue to benefit from skilled PT intervention. Medical Necessity is demonstrated by:  Fall Risk, Unable to participate in daily activities, Continued inability to participate in vocational pursuits, Pain limits function of effected  part for some activities, Unable to participate fully in daily activities, Requires skilled supervision to complete and progress HEP and Weakness.    Patient is making good progress towards established goals.    GOALS: Short Term Goals:  6 weeks  1. Pt will be mod I with all bed mobility to indicate improved functional mobility.   2. Pt will be able to tolerate multi-directional LE strengthening in order to improve ability to perform household chores.  3. Pt will ambulate 100' with LRAD, increased L heel strike, and increased knee flexion to improve gait mechanics.  4. Pt will report 50% improvement in ability to walk long distances since start of care to indicate improved functional mobility.   5. Pt to tolerate HEP to improve ROM and independence with ADL's     Long Term Goals: 12 weeks  1. Pt will perform 20 sit to stands with equal weight bearing to indicate improved transfers.   2. Pt will be able to perform 2 x 10 multi-directional LE strengthening without fatigue in order to improve ability to perform household chores.  3. Pt will report 80% improvement in ability to walk long distances since start of care to indicate improved functional mobility.   4. Pt will ambulate 200' with LRAD and increased R step length to improve gait mechanics.  5. Pt to be Independent with HEP to improve ROM and independence with ADL's    New/Revised goals: None at this time.       Plan:  Continue with established Plan of Care towards PT goals.

## 2018-12-17 ENCOUNTER — CLINICAL SUPPORT (OUTPATIENT)
Dept: REHABILITATION | Facility: HOSPITAL | Age: 63
End: 2018-12-17
Attending: PHYSICAL MEDICINE & REHABILITATION
Payer: MEDICARE

## 2018-12-17 DIAGNOSIS — R53.1 LEFT-SIDED WEAKNESS: ICD-10-CM

## 2018-12-17 DIAGNOSIS — Z74.09 IMPAIRED FUNCTIONAL MOBILITY, BALANCE, GAIT, AND ENDURANCE: ICD-10-CM

## 2018-12-17 DIAGNOSIS — M62.81 MUSCLE WEAKNESS OF LEFT UPPER EXTREMITY: ICD-10-CM

## 2018-12-17 DIAGNOSIS — M25.612 DECREASED RANGE OF MOTION OF LEFT SHOULDER: Primary | ICD-10-CM

## 2018-12-17 PROCEDURE — 97530 THERAPEUTIC ACTIVITIES: CPT | Mod: HCNC,PO | Performed by: OPTOMETRIST

## 2018-12-17 PROCEDURE — 97110 THERAPEUTIC EXERCISES: CPT | Mod: HCNC,PO

## 2018-12-17 NOTE — PROGRESS NOTES
Occupational Therapy Daily Treatment Note     Date: 12/17/2018  Name: Lacey Montana  Clinic Number: [id number]    Therapy Diagnosis:   Encounter Diagnoses   Name Primary?    Decreased range of motion of left shoulder Yes    Muscle weakness of left upper extremity      Physician: Juan Pablo Anaya MD     Physician Orders: Evaluate and treat   Medical Diagnosis: History of stroke, recent hospitalization for PNA  Evaluation Date: 12/04/2018  Insurance Authorization Period Expiration: 11/09/2019  Plan of Care Certification Period: 1/15/2019  Visit # / Visits authorized: 3/6  Date of Return to MD: none scheduled yet     Time In: 0815  Time Out: 0900  Total Billable Time: 45 minutes    Precautions:  Standard      Subjective     Pt reports: That she is using a rollator now as she sometimes needs a place to sit when she ambulating.     Response to previous treatment: Her resting hand splint is worn daily at home.   Functional change: Pt still fatigues easily as she is recovering from pneumonia.     Pain: 3/10  Location: L shoulder during AAROM at end range of flexion     Objective     Lacey participated in dynamic functional therapeutic activities to improve functional performance for 45  minutes, including:  Sitting EOM:   -- Mobilization of palm of left hand for stretching and metacarpal flexion and extension    - General wrist stretches including 1. Scaphoid on radius 2. Increasing mobility of metacarpals 3. Carpal rolls 4. Increasing mobility towards radial deviation 5. Increasing mobility of wrist towards extension 6. Increasing mobility of wrist towards supination/pronation  - Left hand placed on hard surface on mat next to Pt with digits and wrist in extension as Pt was given cues to lean forward to promote increased extension on their own  - Application of GG paddle splint   - Scapular mobilization for elevation, depression, adduction and protraction where AAROM was performed to her Left side.  -  Assisted shoulder flexion from sitting at EOM combined with reaching for targets in various planes  - Pt assisted to supine    From supine:   - Assisted Left shoulder flexion combined with scapular mobilization and once in full flexion Pt performed trunk rotation for long stretch to entire Left side.   - AAROM to flexion and abduction while assist from therapist to keep elbow in extension.   - With R UE extended toward ceiling, Pt was instructed to move her L UE in directions as directed by therapist.  Pt returned to sitting at EOM:  - GG paddle splint removed  - AAROM of digits of Left hand for full extension and for long stretch for supination to promote improved range      Home Exercises and Education Provided     Education provided:   - Pt was asked to bring in her resting hand splint for adjustment and to make sure that she is applying it correctly.   - Progress towards goals - TBA       Assessment     Ms Montana did not report fatigue in today's session as she did in last session. Her L UE was able to be ranged easier today but she is still experiencing pain at end ranges. After removal of GG paddle splint, she was able to produce slight movement of digits flexion but not for extension. She is determined to improve any functional movement that she can.      Lacey is progressing well towards her goals and there are no updates to goals at this time. Pt prognosis is Good.     Pt will continue to benefit from skilled outpatient occupational therapy to address the deficits listed in the problem list on initial evaluation provide pt/family education and to maximize pt's level of independence in the home and community environment.     Anticipated barriers to occupational therapy: Transportation as she relies on cabs to bring her to therapy.     Pt's spiritual, cultural and educational needs considered and pt agreeable to plan of care and goals.    Goals:  LTG GOALS:  Time frame: 6 WEEKS  Mod I bathing safely on  shower chair  I in HEP to prevent overuse in R Shoulder.  Demonstrate knowledge of Energy Conservation and work simplification during ADL and IADL tasks    STG Goals:  Time frame: 3 WEEKS  Lacey will be independent with HEP to improve ROM and normalize tone in LUE and ROM /strength R shoulder.   Lacey will safely weight bear through L hand during kitchen and homecare tasks.  Verbalize knowledge of Energy Conservation and work simplification during ADL and IADL tasks    Plan     Certification Period/Plan of care expiration: 12/04/2018 to 1/15/2019.    Outpatient Occupational Therapy 2 times weekly for 6 weeks to include the following interventions: Manual Therapy, Neuromuscular Re-ed, Orthotic Management and Training, Patient Education, Self Care, Therapeutic Activities and Therapeutic Exercise.       IRIS Lopez   12/17/2018

## 2018-12-19 ENCOUNTER — CLINICAL SUPPORT (OUTPATIENT)
Dept: REHABILITATION | Facility: HOSPITAL | Age: 63
End: 2018-12-19
Attending: PHYSICAL MEDICINE & REHABILITATION
Payer: MEDICARE

## 2018-12-19 DIAGNOSIS — M62.81 MUSCLE WEAKNESS OF LEFT UPPER EXTREMITY: ICD-10-CM

## 2018-12-19 DIAGNOSIS — M25.612 DECREASED RANGE OF MOTION OF LEFT SHOULDER: Primary | ICD-10-CM

## 2018-12-19 DIAGNOSIS — Z74.09 IMPAIRED FUNCTIONAL MOBILITY, BALANCE, GAIT, AND ENDURANCE: ICD-10-CM

## 2018-12-19 DIAGNOSIS — R53.1 LEFT-SIDED WEAKNESS: ICD-10-CM

## 2018-12-19 PROCEDURE — 97530 THERAPEUTIC ACTIVITIES: CPT | Mod: HCNC,PO | Performed by: OPTOMETRIST

## 2018-12-19 PROCEDURE — 97110 THERAPEUTIC EXERCISES: CPT | Mod: HCNC,PO

## 2018-12-19 NOTE — PROGRESS NOTES
Name: Lacey Montana  Clinic Number: [id number]  Date of Treatment: 12/13/2018  Diagnosis: No diagnosis found.    Physician: Juan Pablo Anaya MD    Evaluation Date: 12/4/18  Visit # authorized : 3 /  30  PTA Visit Number: 2  Authorization period: 11/9/19    Time in: 07:00 am   Time Out: 07:55 am   Total Treatment Time: 55 minutes   Billable Time: 55 minutes      Subjective:    Lacey Montana reports rushing this morning. States she had trouble getting he rleft shoe on. Reports no pain but states she feels like her foot is swollen.  Patient reports their pain to be 0/10 on a 0-10 scale with 0 being no pain and 10 being the worst pain imaginable.     Objective      Lacey Montana was instructed in and performed therapeutic exercises to develop strength, endurance, ROM, flexibility, posture and core stabilization for 45 minutes including:      Seated hip adduction: 20 x 5 second hold ball  Seated hip abduction: 20 x 5 second hold orange   Hip abduction on slider: x 2 minutes   Standing weight shifts: x 2 minutes   Standing toe taps on right le with therapist blocking lle to prevent buckling: 2 x 10     Gait training x 300 feet using mainor walker with cues for safety and sequencing        Written Home Exercises Provided:   Pt demo good understanding of the education provided. Lacey Montana demonstrated good return demonstration of activities.     Assessment:     Patient tolerated therapy session fairly well. Patient with significant loeft lower extremity weakness and decreased overall fucntional mobility and safety awareness. Will continue to benefit from skilled physical therapy to address deficits.   Pt will continue to benefit from skilled PT intervention. Medical Necessity is demonstrated by:  Fall Risk, Unable to participate in daily activities, Continued inability to participate in vocational pursuits, Pain limits function of effected part for some activities, Unable to participate fully in daily  activities, Requires skilled supervision to complete and progress HEP and Weakness.    Patient is making good progress towards established goals.    GOALS: Short Term Goals:  6 weeks  1. Pt will be mod I with all bed mobility to indicate improved functional mobility.   2. Pt will be able to tolerate multi-directional LE strengthening in order to improve ability to perform household chores.  3. Pt will ambulate 100' with LRAD, increased L heel strike, and increased knee flexion to improve gait mechanics.  4. Pt will report 50% improvement in ability to walk long distances since start of care to indicate improved functional mobility.   5. Pt to tolerate HEP to improve ROM and independence with ADL's     Long Term Goals: 12 weeks  1. Pt will perform 20 sit to stands with equal weight bearing to indicate improved transfers.   2. Pt will be able to perform 2 x 10 multi-directional LE strengthening without fatigue in order to improve ability to perform household chores.  3. Pt will report 80% improvement in ability to walk long distances since start of care to indicate improved functional mobility.   4. Pt will ambulate 200' with LRAD and increased R step length to improve gait mechanics.  5. Pt to be Independent with HEP to improve ROM and independence with ADL's    New/Revised goals: None at this time.       Plan:  Continue with established Plan of Care towards PT goals.

## 2018-12-19 NOTE — PROGRESS NOTES
"  Occupational Therapy Daily Treatment Note     Date: 12/19/2018  Name: Lacey Montana  Clinic Number: [id number]    Therapy Diagnosis:   Encounter Diagnoses   Name Primary?    Decreased range of motion of left shoulder Yes    Muscle weakness of left upper extremity      Physician: Juan Pablo Anaya MD     Physician Orders: Evaluate and treat   Medical Diagnosis: History of stroke, recent hospitalization for PNA  Evaluation Date: 12/04/2018  Insurance Authorization Period Expiration: 12/27/2019  Plan of Care Certification Period: 1/15/2019  Visit # / Visits authorized: 4/6  Date of Return to MD: none scheduled yet     Time In: 0815  Time Out: 0900  Total Billable Time: 45 minutes    Precautions:  Standard      Subjective     Pt reports: " I had a hard time getting my AFO on this morning, my foot was swollen."      Response to previous treatment: Her resting hand splint is worn daily but not at night.   Functional change: Pt's L foot was more swollen this day and she had difficulty with donning her AFO     Pain: 0/10  Location: N/A      Objective     Lacey participated in dynamic functional therapeutic activities to improve functional performance for 45  minutes, including:  Pt ambulated to session with her rollator and was seated at EOM where it was noticed that her shoe was not tied and AFO was loose on her foot. AFO was replaced and her shoe tied.     Sitting EOM:   -- Mobilization of palm of left hand for stretching and metacarpal flexion and extension    - General wrist stretches including 1. Scaphoid on radius 2. Increasing mobility of metacarpals 3. Carpal rolls 4. Increasing mobility towards radial deviation 5. Increasing mobility of wrist towards extension 6. Increasing mobility of wrist towards supination/pronation  - Left hand placed on hard surface on mat next to Pt with digits and wrist in extension as Pt was given cues to lean forward to promote increased extension on her own  - Application of " GG paddle splint   - Scapular mobilization for elevation, depression, adduction and protraction where AAROM was performed to her Left side.  - Assisted shoulder flexion from sitting at EOM combined with assist for scapular rotation  - Pt assisted to supine    From supine:   - Assisted Left shoulder flexion combined with scapular mobilization and once in full flexion Pt performed trunk rotation for long stretch to entire Left side.   - AAROM to shoulder for flexion and abduction while assist from therapist to keep elbow in extension.   - With R UE extended toward ceiling, Pt was instructed to move her L elbow into flexion and extension where she needed assist for extension.   Pt returned to sitting at EOM:  - GG paddle splint removed  - AAROM of digits of Left hand for full extension and for long stretch for supination to promote improved range    Discussion at end of session regarding if she has heard from [employer] about her orders for new AFO.      Home Exercises and Education Provided     Education provided:   - Pt was asked to bring in her resting hand splint for adjustment and to make sure that she is applying it correctly.   - Progress towards goals - TBA       Assessment     Ms Montana demonstrated gains in elbow extension and she was less fatigued today. She was concerned that her L foot was more swollen earlier but her shoe and AFO fit better at end of session.  After removal of GG paddle splint, she was able to produce slight movement of digits flexion but not for extension. She is determined to improve any functional movement that she can.      Lacey is progressing well towards her goals and there are no updates to goals at this time. Pt prognosis is Good.     Pt will continue to benefit from skilled outpatient occupational therapy to address the deficits listed in the problem list on initial evaluation provide pt/family education and to maximize pt's level of independence in the home and  community environment.     Anticipated barriers to occupational therapy: Transportation as she relies on cabs to bring her to therapy.     Pt's spiritual, cultural and educational needs considered and pt agreeable to plan of care and goals.    Goals:  LTG GOALS:  Time frame: 6 WEEKS  Mod I bathing safely on shower chair  I in HEP to prevent overuse in R Shoulder.  Demonstrate knowledge of Energy Conservation and work simplification during ADL and IADL tasks    STG Goals:  Time frame: 3 WEEKS  Lacey will be independent with HEP to improve ROM and normalize tone in LUE and ROM /strength R shoulder.   Lacey will safely weight bear through L hand during kitchen and homecare tasks.  Verbalize knowledge of Energy Conservation and work simplification during ADL and IADL tasks    Plan     Certification Period/Plan of care expiration: 12/04/2018 to 1/15/2019.    Outpatient Occupational Therapy 2 times weekly for 6 weeks to include the following interventions: Manual Therapy, Neuromuscular Re-ed, Orthotic Management and Training, Patient Education, Self Care, Therapeutic Activities and Therapeutic Exercise.       IRIS Lopez   12/19/2018

## 2018-12-24 ENCOUNTER — CLINICAL SUPPORT (OUTPATIENT)
Dept: REHABILITATION | Facility: HOSPITAL | Age: 63
End: 2018-12-24
Attending: PHYSICAL MEDICINE & REHABILITATION
Payer: MEDICARE

## 2018-12-24 DIAGNOSIS — M62.81 MUSCLE WEAKNESS OF LEFT UPPER EXTREMITY: Primary | ICD-10-CM

## 2018-12-24 DIAGNOSIS — M25.612 DECREASED RANGE OF MOTION OF LEFT SHOULDER: ICD-10-CM

## 2018-12-24 PROCEDURE — 97530 THERAPEUTIC ACTIVITIES: CPT | Mod: HCNC,PO | Performed by: OPTOMETRIST

## 2018-12-24 NOTE — PROGRESS NOTES
"  Occupational Therapy Daily Treatment Note     Date: 12/24/2018  Name: Lacey Montana  Clinic Number: [id number]    Therapy Diagnosis:   Encounter Diagnoses   Name Primary?    Muscle weakness of left upper extremity Yes    Decreased range of motion of left shoulder      Physician: Juan Pablo Anaya MD     Physician Orders: Evaluate and treat   Medical Diagnosis: History of stroke, recent hospitalization for PNA  Evaluation Date: 12/04/2018  Insurance Authorization Period Expiration: 12/27/2019  Plan of Care Certification Period: 1/15/2019  Visit # / Visits authorized: 5/6  Date of Return to MD: none scheduled yet     Time In: 0815  Time Out: 0900  Total Billable Time: 45 minutes    Precautions:  Standard      Subjective     Pt reports: " I have to change my appointment for my leg brace."      Response to previous treatment: Pt reports that she is wearing her resting hand splint and has brought it in today for adjustment.   Functional change: Pt is using her rollator and her SC for standing tasks.     Pain: 0/10  Location: N/A      Objective     Lacey participated in dynamic functional therapeutic activities to improve functional performance for 45  minutes, including:  Pt ambulated to session with her rollator and was seated at EOM where it was noticed that her shoe was not tied and AFO was loose on her foot. AFO was replaced and her shoe tied.     Sitting EOM:   -- Mobilization of palm of left hand for stretching and metacarpal flexion and extension    - General wrist stretches including 1. Scaphoid on radius 2. Increasing mobility of metacarpals 3. Carpal rolls 4. Increasing mobility towards radial deviation 5. Increasing mobility of wrist towards extension 6. Increasing mobility of wrist towards supination/pronation  - Left hand placed on hard surface on mat next to Pt with digits and wrist in extension as Pt was given cues to lean forward to promote increased extension on her own  - Application of GG " paddle splint   - Scapular mobilization for elevation, depression, adduction and protraction where AAROM was performed to her Left side.  - Assisted shoulder flexion from sitting at EOM combined with assist for scapular rotation  - Pt assisted to supine    From supine:   - Assisted Left shoulder flexion combined with scapular mobilization and once in full flexion Pt performed trunk rotation for long stretch to entire Left side.   - AAROM to shoulder for flexion and abduction while assist from therapist to keep elbow in extension.   - With R UE extended toward ceiling, Pt was instructed to move her L elbow into flexion and extension where she needed assist for extension.   Pt returned to sitting at EOM:  - GG paddle splint removed     Pt's resting hand splint was adjusted for increased extension at her wrist and digits. Splint was donned as she expressed comfort in wearing it.   Pt reported that her scheduled visit to [facility] that will fit her with her new AFO has a conflict with her next therapy appointment and [facility] was contacted to possibly make the change. Being [place]uf[place] [employer], [facility] was closed and a message and Email was left for contact after the holiday.      Home Exercises and Education Provided     Education provided:   - Pt was shown the adjustments to her resting hand splint and she was able to mark it on her own during the session.   - Progress towards goals - TBA       Assessment     Ms Montana presented as fatigued initially for this early appointment but was able to participate fully in this session. Her resting hand splint was brought from home and was adjusted to promote increased wrist and digit extension as with recent sessions, she is now able to tolerate increased range for extension. Application of the GG paddle splint in all sessions as well as extended stretch for her L UE and hand has promoted movement in her hand although it is minimal.She is determined to improve any  functional movement that she can.      Lacey is progressing well towards her goals and there are no updates to goals at this time. Pt prognosis is Good.     Pt will continue to benefit from skilled outpatient occupational therapy to address the deficits listed in the problem list on initial evaluation provide pt/family education and to maximize pt's level of independence in the home and community environment.     Anticipated barriers to occupational therapy: Transportation as she relies on cabs to bring her to therapy.     Pt's spiritual, cultural and educational needs considered and pt agreeable to plan of care and goals.    Goals:  LTG GOALS:  Time frame: 6 WEEKS  Mod I bathing safely on shower chair  I in HEP to prevent overuse in R Shoulder.  Demonstrate knowledge of Energy Conservation and work simplification during ADL and IADL tasks    STG Goals:  Time frame: 3 WEEKS  Lacey will be independent with HEP to improve ROM and normalize tone in LUE and ROM /strength R shoulder.   Lacey will safely weight bear through L hand during kitchen and homecare tasks.  Verbalize knowledge of Energy Conservation and work simplification during ADL and IADL tasks    Plan     Certification Period/Plan of care expiration: 12/04/2018 to 1/15/2019.    Outpatient Occupational Therapy 2 times weekly for 6 weeks to include the following interventions: Manual Therapy, Neuromuscular Re-ed, Orthotic Management and Training, Patient Education, Self Care, Therapeutic Activities and Therapeutic Exercise.       IRIS Lopez   12/24/2018

## 2018-12-27 ENCOUNTER — CLINICAL SUPPORT (OUTPATIENT)
Dept: REHABILITATION | Facility: HOSPITAL | Age: 63
End: 2018-12-27
Attending: PHYSICAL MEDICINE & REHABILITATION
Payer: MEDICARE

## 2018-12-27 DIAGNOSIS — Z78.9 IMPAIRED MOTOR CONTROL: Primary | ICD-10-CM

## 2018-12-27 DIAGNOSIS — R53.1 LEFT-SIDED WEAKNESS: ICD-10-CM

## 2018-12-27 DIAGNOSIS — Z74.09 IMPAIRED FUNCTIONAL MOBILITY, BALANCE, GAIT, AND ENDURANCE: ICD-10-CM

## 2018-12-27 PROCEDURE — 97116 GAIT TRAINING THERAPY: CPT | Mod: HCNC,PO

## 2018-12-27 PROCEDURE — 97110 THERAPEUTIC EXERCISES: CPT | Mod: HCNC,PO

## 2018-12-27 PROCEDURE — 97112 NEUROMUSCULAR REEDUCATION: CPT | Mod: HCNC,PO

## 2018-12-27 PROCEDURE — G8978 MOBILITY CURRENT STATUS: HCPCS | Mod: CJ,HCNC,PO

## 2018-12-27 PROCEDURE — G8979 MOBILITY GOAL STATUS: HCPCS | Mod: CK,HCNC,PO

## 2018-12-27 NOTE — PROGRESS NOTES
Occupational Therapy Daily Treatment Note     Date: 12/27/2018  Name: Lacey Montana  Clinic Number: [id number]    Therapy Diagnosis:   Encounter Diagnosis   Name Primary?    Impaired motor control Yes     Physician: Juan Pablo Anaya MD     Physician Orders: Evaluate and treat   Medical Diagnosis: History of stroke, recent hospitalization for PNA  Evaluation Date: 12/04/2018  Insurance Authorization Period Expiration: 12/27/2019  Plan of Care Certification Period: 1/15/2019  Visit # / Visits authorized:6/6  Date of Return to MD: none scheduled yet     Time In: 1300  Time Out: 1345  Total Billable Time: 45 minutes    Precautions:  Standard      Subjective     Pt reports: R shoulder feeling better. She states she has a cream to rub on it but cannot reach it well.      Response to previous treatment: R shoulder less painful  Functional change: Pt is using her rollator and her SC for standing tasks.     Pain: 6/10  Location: R shoulder when she wakes in AM.     Objective   Pt. Has dark scars on upper arms and one open sore on R upper arm. She states she gets things from the kids and one grandchild currently has ringworm. Pt. Sore does not look like ringworm but patient advised to see MD about sores on arms.   Lacey participated in dynamic functional Neuro jacque to improve functional performance for 45  minutes, including:  Pt ambulated to session with her rollator and was seated in chair. Heat placed on R shoulder.      Sitting in chair   -- Mobilization of palm of left hand for stretching and metacarpal flexion and extension    - General wrist stretches including 1. Scaphoid on radius 2. Increasing mobility of metacarpals 3. Carpal rolls 4. Increasing mobility towards radial deviation 5. Increasing mobility of wrist towards extension 6. Increasing mobility of wrist towards supination/pronation  - Left hand placed on hard surface on mat next to Pt with digits and wrist in extension as Pt was given cues to lean  forward to promote increased extension on her own  - Application of GG paddle splint   -  In sit scapula squeezes.   From supine:   - RUE humeral mobs with passive stretch, active reverse pendulum, serratus punch and sidelying ER.   - AAROM Left shoulder in PNF patterns  Pt returned to sitting at EOM:  - GG paddle splint remained in place for PT. PT notified splint belongs to clinic and not patient.      Home Exercises and Education Provided     Education provided:   -Pt. Encouraged to practice good posture.    - Progress towards goals - TBA       Assessment     Pt. Tolerated ROM, scapular stabilization for RUE and tone normalization activities for L. She is determined to improve any functional movement that she can.      Lacey is progressing well towards her goals and there are no updates to goals at this time. Pt prognosis is Good.     Pt will continue to benefit from skilled outpatient occupational therapy to address the deficits listed in the problem list on initial evaluation provide pt/family education and to maximize pt's level of independence in the home and community environment.     Anticipated barriers to occupational therapy: Transportation as she relies on cabs to bring her to therapy.     Pt's spiritual, cultural and educational needs considered and pt agreeable to plan of care and goals.    Goals:  LTG GOALS:  Time frame: 6 WEEKS  Mod I bathing safely on shower chair  I in HEP to prevent overuse in R Shoulder.  Demonstrate knowledge of Energy Conservation and work simplification during ADL and IADL tasks    STG Goals:  Time frame: 3 WEEKS  Lacey will be independent with HEP to improve ROM and normalize tone in LUE and ROM /strength R shoulder.   Lacey will safely weight bear through L hand during kitchen and homecare tasks.  Verbalize knowledge of Energy Conservation and work simplification during ADL and IADL tasks    Plan     Certification Period/Plan of care expiration: 12/04/2018 to  1/15/2019.    Outpatient Occupational Therapy 2 times weekly for 6 weeks to include the following interventions: Manual Therapy, Neuromuscular Re-ed, Orthotic Management and Training, Patient Education, Self Care, Therapeutic Activities and Therapeutic Exercise.       Cris Sandoval OT   12/27/2018

## 2018-12-27 NOTE — PROGRESS NOTES
Name: Lacey Montana  Clinic Number: [id number]  Date of Treatment: 12/27/2018  Diagnosis:   Encounter Diagnoses   Name Primary?    Left-sided weakness     Impaired functional mobility, balance, gait, and endurance        Physician: Juan Pablo Anaya MD     Time in: 1400  Time Out: 1455  Total Treatment Time: 55 minutes  Last G-code: 12/27/18 (visit 4)  Last PN: NA  Date of eval: 12/4/18  Visit #: 4/30  Auth expiration: 11/9/19  POC expiration: 3/4/19    Precautions: possible stress-induced seizures, standard    Subjective:    Lacey Montana reports compliance with HEP. 45% improvement in ability to walk long distances since start of care. Patient reports their pain to be 0/10 on a 0-10 scale with 0 being no pain and 10 being the worst pain imaginable.     Objective    BOLD= performed today    Lacey Montana was instructed in and performed therapeutic exercises to develop strength, endurance, ROM, flexibility, posture and core stabilization for 45 minutes including:      Seated hip adduction: 20 x 5 second hold ball  Seated hip abduction: 20 x 5 second hold orange   Hip abduction on slider: x 2 minutes   Standing weight shifts: x 2 minutes   Standing toe taps on right le with therapist blocking lle to prevent buckling: 2 x 10     +Standing march at counter x 20 ea  +Standing hip abd at counter x 20 ea  +Sit to stands from elevated surface w feet staggered L foot back 2 x 10  +LAQ x 20    Pt participated in the following gait training for 10 minutes:  Amb x 300 feet using SPC with cues for safety and sequencing  +Gait part practice at counter w sb and stepping fwd w R x 20, w L x 12    Written Home Exercises Provided: standing march, standing hip abduction, LAQ, sit <> stand  Pt demo good understanding of the education provided. Lacey Montana demonstrated good return demonstration of activities.     Functional Limitation Report- G-CODE:  CMS Impairment/Limitation/Restriction for FOTO  Cerebrovascular Disorders Survey  Status Limitation G-Code CMS Severity Modifier  Intake 50% 50%  Predicted 54% 46% Goal Status+ CK - At least 40 percent but less than 60 percent  12/27/2018 63% 37% Current Status CJ - At least 20 percent but less than 40 percent  +Based on FOTO predicted change score    Assessment:     Lacey was re-assessed today with 1/5 STGs being met indicating improvements in tolerance for LE strengthening since start of care. She continues with L sided weakness and impaired gait, balance, endurance, and functional mobility. Pt could benefit from continued physical therapy services to address deficits.     She had good tolerance to treatment today with no adverse effects. Appropriate exercise-induced fatigue by end of session. She was challenged with standing LE strengthening and sit <> stand transfers with emphasis on L LE use. Pt required frequent seated rest breaks throughout session indicating need to improve endurance. L hip flexor weakness noted with gait part practice.     Pt will continue to benefit from skilled PT intervention. Medical Necessity is demonstrated by:  Fall Risk, Unable to participate in daily activities, Continued inability to participate in vocational pursuits, Pain limits function of effected part for some activities, Unable to participate fully in daily activities, Requires skilled supervision to complete and progress HEP and Weakness.    Patient is making good progress towards established goals.    GOALS: Short Term Goals:  6 weeks  1. Pt will be mod I with all bed mobility to indicate improved functional mobility.- in progress   2. Pt will be able to tolerate multi-directional LE strengthening in order to improve ability to perform household chores.- met 12/27/18  3. Pt will ambulate 100' with LRAD, increased L heel strike, and increased knee flexion to improve gait mechanics.- in progress  4. Pt will report 50% improvement in ability to walk long distances  since start of care to indicate improved functional mobility.- in progress   5. Pt to tolerate HEP to improve ROM and independence with ADL's- in progress     Long Term Goals: 12 weeks  1. Pt will perform 20 sit to stands with equal weight bearing to indicate improved transfers.   2. Pt will be able to perform 2 x 10 multi-directional LE strengthening without fatigue in order to improve ability to perform household chores.  3. Pt will report 80% improvement in ability to walk long distances since start of care to indicate improved functional mobility.   4. Pt will ambulate 200' with LRAD and increased R step length to improve gait mechanics.  5. Pt to be Independent with HEP to improve ROM and independence with ADL's    New/Revised goals: None at this time.       Plan:  Continue with established Plan of Care towards PT goals.

## 2019-01-02 ENCOUNTER — CLINICAL SUPPORT (OUTPATIENT)
Dept: REHABILITATION | Facility: HOSPITAL | Age: 64
End: 2019-01-02
Attending: PHYSICAL MEDICINE & REHABILITATION
Payer: MEDICARE

## 2019-01-02 DIAGNOSIS — Z78.9 IMPAIRED MOTOR CONTROL: Primary | ICD-10-CM

## 2019-01-02 DIAGNOSIS — R53.1 LEFT-SIDED WEAKNESS: ICD-10-CM

## 2019-01-02 DIAGNOSIS — Z74.09 IMPAIRED FUNCTIONAL MOBILITY, BALANCE, GAIT, AND ENDURANCE: ICD-10-CM

## 2019-01-02 PROCEDURE — 97110 THERAPEUTIC EXERCISES: CPT | Mod: HCNC,PO

## 2019-01-02 PROCEDURE — 97112 NEUROMUSCULAR REEDUCATION: CPT | Mod: HCNC,PO

## 2019-01-02 NOTE — PROGRESS NOTES
Name: Lacey Montana  Clinic Number: [id number]  Date of Treatment: 01/02/2019  Diagnosis:   Encounter Diagnoses   Name Primary?    Left-sided weakness     Impaired functional mobility, balance, gait, and endurance        Physician: Juan Pablo Anaya MD     Time in: 07:00 am   Time Out: 07:55 am   Total Treatment Time: 55 minutes  Last G-code: 12/27/18 (visit 5)  Last PN: NA  Date of eval: 12/4/18  Visit #: 5 / 30  Auth expiration: 11/9/19  POC expiration: 3/4/19    Precautions: possible stress-induced seizures, standard    Subjective:    Lacey Montana reports compliance with HEP, and no pain. Patient reports their pain to be 0/10 on a 0-10 scale with 0 being no pain and 10 being the worst pain imaginable.     Objective    Lacey Montana was instructed in and performed therapeutic exercises to develop strength, endurance, ROM, flexibility, posture and core stabilization for 45 minutes including:      Hip abduction on slider: x 2 minutes   Standing weight shifts: x 2 minutes   Standing toe taps on right le with therapist blocking lle to prevent buckling: 2 x 10   Standing march at counter x 20 ea  Standing hip abd at counter x 20 ea  Sit to stands from elevated surface w feet staggered L foot back 2 x 10  LAQ x 20    Pt participated in the following gait training for 10 minutes:    Amb x 300 feet using SPC with cues for safety and sequencing  +Gait part practice at counter w sb and stepping fwd w R x 20, w L x 12    Written Home Exercises Provided: standing march, standing hip abduction, LAQ, sit <> stand  Pt demo good understanding of the education provided. Lacey Montana demonstrated good return demonstration of activities.       Assessment:     Patient tolerated therapy session well. Expected exercise induced fatigue post treatment session. Patient required several rest breaks throughout treatment session due to decreased endurance and will continue to benefit from skilled physical therapy to  address deficits and improve overall functional mobility.   Pt will continue to benefit from skilled PT intervention. Medical Necessity is demonstrated by:  Fall Risk, Unable to participate in daily activities, Continued inability to participate in vocational pursuits, Pain limits function of effected part for some activities, Unable to participate fully in daily activities, Requires skilled supervision to complete and progress HEP and Weakness.    Patient is making good progress towards established goals.    GOALS: Short Term Goals:  6 weeks  1. Pt will be mod I with all bed mobility to indicate improved functional mobility.- in progress   2. Pt will be able to tolerate multi-directional LE strengthening in order to improve ability to perform household chores.- met 12/27/18  3. Pt will ambulate 100' with LRAD, increased L heel strike, and increased knee flexion to improve gait mechanics.- in progress  4. Pt will report 50% improvement in ability to walk long distances since start of care to indicate improved functional mobility.- in progress   5. Pt to tolerate HEP to improve ROM and independence with ADL's- in progress     Long Term Goals: 12 weeks  1. Pt will perform 20 sit to stands with equal weight bearing to indicate improved transfers.   2. Pt will be able to perform 2 x 10 multi-directional LE strengthening without fatigue in order to improve ability to perform household chores.  3. Pt will report 80% improvement in ability to walk long distances since start of care to indicate improved functional mobility.   4. Pt will ambulate 200' with LRAD and increased R step length to improve gait mechanics.  5. Pt to be Independent with HEP to improve ROM and independence with ADL's    New/Revised goals: None at this time.       Plan:  Continue with established Plan of Care towards PT goals.     Face to face conference completed with Paola Rose PT regarding patients progress towards goals and plan of care

## 2019-01-02 NOTE — PROGRESS NOTES
Occupational Therapy Daily Treatment Note     Date: 1/2/2019  Name: Lacey Montana  Clinic Number: [id number]    Therapy Diagnosis:   Encounter Diagnosis   Name Primary?    Impaired motor control Yes     Physician: Juan Pablo Anaya MD     Physician Orders: Evaluate and treat   Medical Diagnosis: History of stroke, recent hospitalization for PNA  Evaluation Date: 12/04/2018  Insurance Authorization Period Expiration: 11/09/19  Plan of Care Certification Period: 1/15/2019  Visit # / Visits authorized:7/30  Date of Return to MD: none scheduled yet     Time In: 815  Time Out: 900  Total Billable Time: 45 minutes    Precautions:  Standard      Subjective     Pt reports: R shoulder feeling better. She states she needs vitamins as she feels tired a lot.   Response to previous treatment: R shoulder less painful  Functional change: Pt is using her rollator and her SC for standing tasks. She places L hand in WB whenever she can.     Pain: 0/10  Location: R shoulder feeling better with exercises.    Objective   Lacey participated in dynamic functional Neuro jacque to improve functional performance for 45  minutes, including:  Pt ambulated to session with her rollator and was seated in chair. Heat placed on R shoulder.      Sitting in chair   -- Mobilization of palm of left hand for stretching and metacarpal flexion and extension    - General wrist stretches including 1. Scaphoid on radius 2. Increasing mobility of metacarpals 3. Carpal rolls 4. Increasing mobility towards radial deviation 5. Increasing mobility of wrist towards extension 6. Increasing mobility of wrist towards supination/pronation  - Left hand placed on hard surface on mat next to Pt with digits and wrist in extension as Pt was given cues to lean forward to promote increased extension on her own  - Application of GG paddle splint   -  In sit scapula squeezes.   - WB on LUE in flexion and er/ext with body on arm weight shifts.   - BWB and unilateral WB  on 8 inch ball  From supine:   - RUE humeral mobs with passive stretch, active reverse pendulum, serratus punch   - AAROM Left shoulder in PNF patterns  - B WB on ball with body on arm weight shifts and then B arms on body.   From stand:   WB on cloth on wall with body on arm, body with arm and arm on body weight shifts. Physical cues needed to encourage ER and humeral adduction.   Home Exercises and Education Provided     Education provided:   -Pt. Encouraged to practice good posture.  Encouraged to keep L hand in WB.  - Progress towards goals - TBA       Assessment     Pt. R shoulder is feeling better. She is determined to improve any functional movement that she can so works hard but she has no change in LUE function. She states she cannot put R arm on table during activities in sit at table bc there is not enough room.       Lacey is progressing well towards her goals and there are no updates to goals at this time. Pt prognosis is Good.     Pt will continue to benefit from skilled outpatient occupational therapy to address the deficits listed in the problem list on initial evaluation provide pt/family education and to maximize pt's level of independence in the home and community environment.     Anticipated barriers to occupational therapy: Transportation as she relies on cabs to bring her to therapy.     Pt's spiritual, cultural and educational needs considered and pt agreeable to plan of care and goals.    Goals:  LTG GOALS:  Time frame: 6 WEEKS  Mod I bathing safely on shower chair  I in HEP to prevent overuse in R Shoulder.  Demonstrate knowledge of Energy Conservation and work simplification during ADL and IADL tasks    STG Goals:  Time frame: 3 WEEKS  Lacey will be independent with HEP to improve ROM and normalize tone in LUE and ROM /strength R shoulder. MET  Lacey will safely weight bear through L hand during kitchen and homecare tasks. Progressing  Verbalize knowledge of Energy Conservation  and work simplification during ADL and IADL tasks Progressing    Plan     Certification Period/Plan of care expiration: 12/04/2018 to 1/15/2019.    Outpatient Occupational Therapy 2 times weekly for 6 weeks to include the following interventions: Manual Therapy, Neuromuscular Re-ed, Orthotic Management and Training, Patient Education, Self Care, Therapeutic Activities and Therapeutic Exercise.       Cris Sandoval OT   1/2/2019

## 2019-01-04 ENCOUNTER — CLINICAL SUPPORT (OUTPATIENT)
Dept: REHABILITATION | Facility: HOSPITAL | Age: 64
End: 2019-01-04
Attending: PHYSICAL MEDICINE & REHABILITATION
Payer: MEDICARE

## 2019-01-04 DIAGNOSIS — R53.1 LEFT-SIDED WEAKNESS: ICD-10-CM

## 2019-01-04 DIAGNOSIS — M62.81 MUSCLE WEAKNESS OF LEFT UPPER EXTREMITY: ICD-10-CM

## 2019-01-04 DIAGNOSIS — Z74.09 IMPAIRED FUNCTIONAL MOBILITY, BALANCE, GAIT, AND ENDURANCE: ICD-10-CM

## 2019-01-04 DIAGNOSIS — M25.612 DECREASED RANGE OF MOTION OF LEFT SHOULDER: ICD-10-CM

## 2019-01-04 DIAGNOSIS — Z78.9 IMPAIRED MOTOR CONTROL: Primary | ICD-10-CM

## 2019-01-04 PROBLEM — I63.9 CVA (CEREBRAL VASCULAR ACCIDENT): Status: ACTIVE | Noted: 2018-07-20

## 2019-01-04 PROCEDURE — 97530 THERAPEUTIC ACTIVITIES: CPT | Mod: HCNC,PO | Performed by: OPTOMETRIST

## 2019-01-04 PROCEDURE — 97110 THERAPEUTIC EXERCISES: CPT | Mod: HCNC,PO

## 2019-01-04 NOTE — PROGRESS NOTES
Name: Lacey Montana  Clinic Number: [id number]  Date of Treatment: 01/04/2019  Diagnosis:   Encounter Diagnoses   Name Primary?    Left-sided weakness     Impaired functional mobility, balance, gait, and endurance        Physician: Juan Pablo Anaya MD     Time in: 07:00 am   Time Out: 07:55 am   Total Treatment Time: 55 minutes  Last G-code: 12/27/18 (visit 5)  Last PN: NA  Date of eval: 12/4/18  Visit #: 7 / 30  Auth expiration: 11/9/19  POC expiration: 3/4/19    Precautions: possible stress-induced seizures, standard    Subjective:    Lacey Montana reports compliance with HEP, and no pain. Patient reports their pain to be 0/10 on a 0-10 scale with 0 being no pain and 10 being the worst pain imaginable.     Objective    Lacey Montana was instructed in and performed therapeutic exercises to develop strength, endurance, ROM, flexibility, posture and core stabilization for 45 minutes including:      Hip abduction on slider: x 2 minutes   Standing weight shifts: x 2 minutes   Standing toe taps on right le with therapist blocking lle to prevent buckling: 2 x 10   Standing march at counter x 20 ea  Standing hip abd at counter x 20 ea  Sit to stands from elevated surface w feet staggered L foot back 2 x 10  LAQ x 20    Pt participated in the following gait training for 10 minutes:    Amb x 300 feet using SPC with cues for safety and sequencing  +Gait part practice at counter w sb and stepping fwd w R x 20, w L x 12    Written Home Exercises Provided: standing march, standing hip abduction, LAQ, sit <> stand  Pt demo good understanding of the education provided. Lacey Montana demonstrated good return demonstration of activities.       Assessment:     Patient tolerated therapy session well. Expected exercise induced fatigue post treatment session. Patient required several rest breaks throughout treatment session due to decreased endurance and will continue to benefit from skilled physical therapy to  address deficits and improve overall functional mobility.   Pt will continue to benefit from skilled PT intervention. Medical Necessity is demonstrated by:  Fall Risk, Unable to participate in daily activities, Continued inability to participate in vocational pursuits, Pain limits function of effected part for some activities, Unable to participate fully in daily activities, Requires skilled supervision to complete and progress HEP and Weakness.    Patient is making good progress towards established goals.    GOALS: Short Term Goals:  6 weeks  1. Pt will be mod I with all bed mobility to indicate improved functional mobility.- in progress   2. Pt will be able to tolerate multi-directional LE strengthening in order to improve ability to perform household chores.- met 12/27/18  3. Pt will ambulate 100' with LRAD, increased L heel strike, and increased knee flexion to improve gait mechanics.- in progress  4. Pt will report 50% improvement in ability to walk long distances since start of care to indicate improved functional mobility.- in progress   5. Pt to tolerate HEP to improve ROM and independence with ADL's- in progress     Long Term Goals: 12 weeks  1. Pt will perform 20 sit to stands with equal weight bearing to indicate improved transfers.   2. Pt will be able to perform 2 x 10 multi-directional LE strengthening without fatigue in order to improve ability to perform household chores.  3. Pt will report 80% improvement in ability to walk long distances since start of care to indicate improved functional mobility.   4. Pt will ambulate 200' with LRAD and increased R step length to improve gait mechanics.  5. Pt to be Independent with HEP to improve ROM and independence with ADL's    New/Revised goals: None at this time.       Plan:  Continue with established Plan of Care towards PT goals.      Full Thickness Lip Wedge Repair (Flap) Text: Given the location of the defect and the proximity to free margins a full thickness wedge repair was deemed most appropriate.  Using a sterile surgical marker, the appropriate repair was drawn incorporating the defect and placing the expected incisions perpendicular to the vermilion border.  The vermilion border was also meticulously outlined to ensure appropriate reapproximation during the repair.  The area thus outlined was incised through and through with a #15 scalpel blade.  The muscularis and dermis were reaproximated with deep sutures following hemostasis. Care was taken to realign the vermilion border before proceeding with the superficial closure.  Once the vermilion was realigned the superfical and mucosal closure was finished.

## 2019-01-04 NOTE — PROGRESS NOTES
"  Occupational Therapy Daily Treatment Note     Date: 1/4/2019  Name: Lacey Montana  Clinic Number: [id number]    Therapy Diagnosis:   Encounter Diagnoses   Name Primary?    Impaired motor control Yes    Muscle weakness of left upper extremity     Decreased range of motion of left shoulder      Physician: Juan Pablo Anaya MD     Physician Orders: Evaluate and treat   Medical Diagnosis: History of stroke, recent hospitalization for PNA  Evaluation Date: 12/04/2018  Insurance Authorization Period Expiration: 11/09/19  Plan of Care Certification Period: 1/15/2019  Visit # / Visits authorized:1/ 22 ( 8 visits for this admission in 2018)   Date of Return to MD: none scheduled yet     Time In: 815  Time Out: 900  Total Billable Time: 45 minutes    Precautions:  Standard      Subjective     Pt reports: " I going to have a procedure for kidney stones later this month and will miss some visits."    Response to previous treatment: No pain in L shoulder today  Functional change: Pt is using her rollator for ambulation    Pain: 0/10  Location:     Objective     Lacey participated in dynamic functional therapeutic activities to improve functional performance for 45  minutes, including:  Pt ambulated to session with her rollator     Sitting EOM:   -- Mobilization of palm of left hand for stretching and metacarpal flexion and extension    - General wrist stretches including 1. Scaphoid on radius 2. Increasing mobility of metacarpals 3. Carpal rolls 4. Increasing mobility towards radial deviation 5. Increasing mobility of wrist towards extension 6. Increasing mobility of wrist towards supination/pronation  - Left hand placed on hard surface on mat next to Pt with digits and wrist in extension as Pt was given cues to lean forward to promote increased extension on her own  - Application of GG paddle splint   - Scapular mobilization for elevation, depression, adduction and protraction where AAROM was performed to her Left " side.  - Assisted shoulder flexion from sitting at EOM combined with assist for scapular rotation  - Pt assisted to supine      From supine:   - Assisted Left shoulder flexion combined with scapular mobilization and once in full flexion Pt performed trunk rotation for long stretch to entire Left side.   - AAROM to shoulder for flexion and abduction while assist from therapist to keep elbow in extension.   - With R UE extended toward ceiling, Pt was instructed to move her L elbow into flexion and extension where she needed assist for extension.   - Pt was assisted with movements of L UE for reaching across midline, above head and placed back at side    Pt returned to sitting at EOM:  - GG paddle splint removed   - Pt assessed for digit movement as she has Trace for digit flexion and no movement for extension. She has Poor movement for wrist extension.         Home Exercises and Education Provided     Education provided:   - Pt states that she is wearing her resting and splint at home with no difficulties.   - Progress towards goals - TBA       Assessment     Ms Montana stated that she has no pain in her L or R shoulder this day. She is also concerned that her procedure later this month will interferer with her appointments and future schedule of visits. Her progress is limited and continuation of visits after her procedure will be assessed in the coming visits.  Application of the GG paddle splint in all sessions as well as extended stretch for her L UE and hand has promoted movement in her hand although it is minimal.She is determined to improve any functional movement that she can.      Lacey is progressing well towards her goals and there are no updates to goals at this time. Pt prognosis is Good.     Pt will continue to benefit from skilled outpatient occupational therapy to address the deficits listed in the problem list on initial evaluation provide pt/family education and to maximize pt's level of  independence in the home and community environment.     Anticipated barriers to occupational therapy: Transportation as she relies on cabs to bring her to therapy.     Pt's spiritual, cultural and educational needs considered and pt agreeable to plan of care and goals.    Goals:  LTG GOALS:  Time frame: 6 WEEKS  Mod I bathing safely on shower chair  I in HEP to prevent overuse in R Shoulder.  Demonstrate knowledge of Energy Conservation and work simplification during ADL and IADL tasks    STG Goals:  Time frame: 3 WEEKS  Lacey will be independent with HEP to improve ROM and normalize tone in LUE and ROM /strength R shoulder. MET  Lacey will safely weight bear through L hand during kitchen and homecare tasks. Progressing  Verbalize knowledge of Energy Conservation and work simplification during ADL and IADL tasks Progressing    Plan     Certification Period/Plan of care expiration: 12/04/2018 to 1/18/2019.    Outpatient Occupational Therapy 2 times weekly for 6 weeks to include the following interventions: Manual Therapy, Neuromuscular Re-ed, Orthotic Management and Training, Patient Education, Self Care, Therapeutic Activities and Therapeutic Exercise.       IRIS Lopez   1/4/2019

## 2019-01-07 ENCOUNTER — CLINICAL SUPPORT (OUTPATIENT)
Dept: REHABILITATION | Facility: HOSPITAL | Age: 64
End: 2019-01-07
Attending: PHYSICAL MEDICINE & REHABILITATION
Payer: MEDICARE

## 2019-01-07 DIAGNOSIS — Z78.9 IMPAIRED MOTOR CONTROL: Primary | ICD-10-CM

## 2019-01-07 DIAGNOSIS — M25.60 DECREASED RANGE OF MOTION: ICD-10-CM

## 2019-01-07 DIAGNOSIS — R53.1 LEFT-SIDED WEAKNESS: ICD-10-CM

## 2019-01-07 DIAGNOSIS — Z74.09 IMPAIRED FUNCTIONAL MOBILITY, BALANCE, GAIT, AND ENDURANCE: ICD-10-CM

## 2019-01-07 DIAGNOSIS — M25.612 DECREASED RANGE OF MOTION OF LEFT SHOULDER: ICD-10-CM

## 2019-01-07 PROCEDURE — 97110 THERAPEUTIC EXERCISES: CPT | Mod: HCNC,PO

## 2019-01-07 PROCEDURE — 97112 NEUROMUSCULAR REEDUCATION: CPT | Mod: HCNC,PO

## 2019-01-07 NOTE — PROGRESS NOTES
Name: Lacey Montana  Clinic Number: [id number]  Date of Treatment: 01/07/2019  Diagnosis:   Encounter Diagnoses   Name Primary?    Left-sided weakness     Impaired functional mobility, balance, gait, and endurance        Physician: Juan Pablo Anaya MD     Time in: 07:00 am   Time Out: 07:55 am   Total Treatment Time: 55 minutes  Last G-code: 12/27/18 (visit 5)  Last PN: NA  Date of eval: 12/4/18  Visit #: 8 / 30  Auth expiration: 11/9/19  POC expiration: 3/4/19    Precautions: possible stress-induced seizures, standard    Subjective:    Lacey Montana reports she has been doing her exercises daily.  Patient reports their pain to be 0/10 on a 0-10 scale with 0 being no pain and 10 being the worst pain imaginable.     Objective    Lacey Montana was instructed in and performed therapeutic exercises to develop strength, endurance, ROM, flexibility, posture and core stabilization for 45 minutes including:      Hip abduction on slider: x 2 minutes   Standing weight shifts: x 2 minutes   Standing toe taps on right le with therapist blocking lle to prevent buckling: 2 x 10   Standing march at counter x 20 ea  Standing hip abd at counter x 20 ea  Sit to stands from elevated surface w feet staggered L foot back 2 x 10  LAQ x 20    Pt participated in the following gait training for 10 minutes:    Amb x 300 feet using SPC with cues for safety and sequencing  +Gait part practice at counter w sb and stepping fwd w R x 20, w L x 12    Written Home Exercises Provided: standing march, standing hip abduction, LAQ, sit <> stand  Pt demo good understanding of the education provided. Lacey Montana demonstrated good return demonstration of activities.       Assessment:     Patient tolerated therapy session well. Expected exercise induced fatigue post treatment session. Patient required several rest breaks throughout treatment session due to decreased endurance and will continue to benefit from skilled physical  therapy to address deficits and improve overall functional mobility.   Pt will continue to benefit from skilled PT intervention. Medical Necessity is demonstrated by:  Fall Risk, Unable to participate in daily activities, Continued inability to participate in vocational pursuits, Pain limits function of effected part for some activities, Unable to participate fully in daily activities, Requires skilled supervision to complete and progress HEP and Weakness.    Patient is making good progress towards established goals.    GOALS: Short Term Goals:  6 weeks  1. Pt will be mod I with all bed mobility to indicate improved functional mobility.- in progress   2. Pt will be able to tolerate multi-directional LE strengthening in order to improve ability to perform household chores.- met 12/27/18  3. Pt will ambulate 100' with LRAD, increased L heel strike, and increased knee flexion to improve gait mechanics.- in progress  4. Pt will report 50% improvement in ability to walk long distances since start of care to indicate improved functional mobility.- in progress   5. Pt to tolerate HEP to improve ROM and independence with ADL's- in progress     Long Term Goals: 12 weeks  1. Pt will perform 20 sit to stands with equal weight bearing to indicate improved transfers.   2. Pt will be able to perform 2 x 10 multi-directional LE strengthening without fatigue in order to improve ability to perform household chores.  3. Pt will report 80% improvement in ability to walk long distances since start of care to indicate improved functional mobility.   4. Pt will ambulate 200' with LRAD and increased R step length to improve gait mechanics.  5. Pt to be Independent with HEP to improve ROM and independence with ADL's    New/Revised goals: None at this time.       Plan:  Continue with established Plan of Care towards PT goals.

## 2019-01-07 NOTE — PROGRESS NOTES
Occupational Therapy Daily Treatment Note     Date: 1/7/2019  Name: Lacey Montana  Clinic Number: [id number]    Therapy Diagnosis:   Encounter Diagnoses   Name Primary?    Decreased range of motion of left shoulder     Impaired motor control Yes     Physician: Juan Pablo Anaya MD     Physician Orders: Evaluate and treat   Medical Diagnosis: History of stroke, recent hospitalization for PNA  Evaluation Date: 12/04/2018  Insurance Authorization Period Expiration: 11/09/19  Plan of Care Certification Period: 1/15/2019  Visit # / Visits authorized:2/ 22 ( 8 visits for this admission in 2018)   Date of Return to MD: none scheduled yet     Time In: 815  Time Out: 900  Total Billable Time: 45 minutes    Precautions:  Standard      Subjective     Pt reports:Nothing new    Response to previous treatment: No pain in L shoulder today but she complains of tightness  Functional change: Able to use RUE for basic self care.     Pain: 0/10  Location: shoulders    Objective     Lacey participated in dynamic functional neuro re-ed to improve functional performance for 45  minutes, including:  Pt ambulated to session with her rollator     Sitting EOM:   -- Mobilization of palm of left hand for stretching and metacarpal flexion and extension    - General wrist stretches including 1. Scaphoid on radius 2. Increasing mobility of metacarpals 3. Carpal rolls 4. Increasing mobility towards radial deviation 5. Increasing mobility of wrist towards extension 6. Increasing mobility of wrist towards supination/pronation  - Left hand placed on hard surface on mat next to Pt with digits and wrist in extension as Pt was given cues to lean forward to promote increased extension of wrist on her own and then active elbow extension with return to upright.   - Application of GG paddle splint   - Scapular mobilization for elevation, depression, adduction and protraction  performed to B scaps.   - supine exercises for scapular stability on  R  - standing exercises WB on LUE with body on arm weight shifts.   - Pt. Shown how to assist WB of LUE with R.  -GG splint removed.       Home Exercises and Education Provided     Education provided:   - Pt states that she is wearing her resting hand splint at home with no difficulties.   - Progress towards goals - Pt. Is active at home with hemiplegic techniques.        Assessment     Ms Montana stated that she has no pain in her L or R shoulder this day. She is determined to improve any functional movement that she can.      Lacey is progressing well towards her goals and there are no updates to goals at this time. Pt prognosis is Good.     Pt will continue to benefit from skilled outpatient occupational therapy to address the deficits listed in the problem list on initial evaluation provide pt/family education and to maximize pt's level of independence in the home and community environment.     Anticipated barriers to occupational therapy: Transportation as she relies on cabs to bring her to therapy.     Pt's spiritual, cultural and educational needs considered and pt agreeable to plan of care and goals.    Goals:  LTG GOALS:  Time frame: 6 WEEKS  Mod I bathing safely on shower chair  I in HEP to prevent overuse in R Shoulder.  Demonstrate knowledge of Energy Conservation and work simplification during ADL and IADL tasks    STG Goals:  Time frame: 3 WEEKS  Lacey will be independent with HEP to improve ROM and normalize tone in LUE and ROM /strength R shoulder. MET  Lacey will safely weight bear through L hand during kitchen and homecare tasks. Progressing  Verbalize knowledge of Energy Conservation and work simplification during ADL and IADL tasks Progressing    Plan     Certification Period/Plan of care expiration: 12/04/2018 to 1/18/2019.    Outpatient Occupational Therapy 2 times weekly for 6 weeks to include the following interventions: Manual Therapy, Neuromuscular Re-ed, Orthotic Management and  Training, Patient Education, Self Care, Therapeutic Activities and Therapeutic Exercise.       Cris Sandoval, OT   1/7/2019

## 2019-01-11 ENCOUNTER — CLINICAL SUPPORT (OUTPATIENT)
Dept: REHABILITATION | Facility: HOSPITAL | Age: 64
End: 2019-01-11
Attending: PHYSICAL MEDICINE & REHABILITATION
Payer: MEDICARE

## 2019-01-11 DIAGNOSIS — R53.1 LEFT-SIDED WEAKNESS: ICD-10-CM

## 2019-01-11 DIAGNOSIS — Z78.9 IMPAIRED MOTOR CONTROL: Primary | ICD-10-CM

## 2019-01-11 DIAGNOSIS — M25.612 DECREASED RANGE OF MOTION OF LEFT SHOULDER: ICD-10-CM

## 2019-01-11 DIAGNOSIS — Z74.09 IMPAIRED FUNCTIONAL MOBILITY, BALANCE, GAIT, AND ENDURANCE: ICD-10-CM

## 2019-01-11 PROCEDURE — 97110 THERAPEUTIC EXERCISES: CPT | Mod: HCNC,PO

## 2019-01-11 PROCEDURE — 97112 NEUROMUSCULAR REEDUCATION: CPT | Mod: HCNC,PO

## 2019-01-11 NOTE — PROGRESS NOTES
Name: Lacey Montana  Clinic Number: [id number]  Date of Treatment: 01/11/2019  Diagnosis:   Encounter Diagnoses   Name Primary?    Left-sided weakness     Impaired functional mobility, balance, gait, and endurance        Physician: Juan Pablo Anaya MD     Time in: 07:00 am   Time Out: 07:55 am   Total Treatment Time: 55 minutes  Last G-code: 12/27/18 (visit 5)  Last PN: NA  Date of eval: 12/4/18  Visit #: 8 / 30  Auth expiration: 11/9/19  POC expiration: 3/4/19    Precautions: possible stress-induced seizures, standard    Subjective:    Lacey Montana reports she has been doing her exercises daily.  Patient reports their pain to be 0/10 on a 0-10 scale with 0 being no pain and 10 being the worst pain imaginable.       Objective    Lacey Montana was instructed in and performed therapeutic exercises to develop strength, endurance, ROM, flexibility, posture and core stabilization for 45 minutes including:      Hip abduction on slider: x 2 minutes   Standing weight shifts: x 2 minutes   Standing toe taps on right le with therapist blocking lle to prevent buckling: 2 x 10   Standing march at counter x 20 ea  Standing hip abd at counter x 20 ea  Sit to stands from elevated surface w feet staggered L foot back 2 x 10  LAQ x 20    Pt participated in the following gait training for 10 minutes:    Amb x 300 feet using SPC with cues for safety and sequencing  +Gait part practice at counter w sb and stepping fwd w R x 20, w L x 12    Written Home Exercises Provided: standing march, standing hip abduction, LAQ, sit <> stand  Pt demo good understanding of the education provided. Laecy Montana demonstrated good return demonstration of activities.       Assessment:     Patient tolerated therapy session well. Expected exercise induced fatigue post treatment session. Patient required several rest breaks throughout treatment session due to decreased endurance and will continue to benefit from skilled physical  therapy to address deficits and improve overall functional mobility.   Pt will continue to benefit from skilled PT intervention. Medical Necessity is demonstrated by:  Fall Risk, Unable to participate in daily activities, Continued inability to participate in vocational pursuits, Pain limits function of effected part for some activities, Unable to participate fully in daily activities, Requires skilled supervision to complete and progress HEP and Weakness.    Patient is making good progress towards established goals.    GOALS: Short Term Goals:  6 weeks  1. Pt will be mod I with all bed mobility to indicate improved functional mobility.- in progress   2. Pt will be able to tolerate multi-directional LE strengthening in order to improve ability to perform household chores.- met 12/27/18  3. Pt will ambulate 100' with LRAD, increased L heel strike, and increased knee flexion to improve gait mechanics.- in progress  4. Pt will report 50% improvement in ability to walk long distances since start of care to indicate improved functional mobility.- in progress   5. Pt to tolerate HEP to improve ROM and independence with ADL's- in progress     Long Term Goals: 12 weeks  1. Pt will perform 20 sit to stands with equal weight bearing to indicate improved transfers.   2. Pt will be able to perform 2 x 10 multi-directional LE strengthening without fatigue in order to improve ability to perform household chores.  3. Pt will report 80% improvement in ability to walk long distances since start of care to indicate improved functional mobility.   4. Pt will ambulate 200' with LRAD and increased R step length to improve gait mechanics.  5. Pt to be Independent with HEP to improve ROM and independence with ADL's    New/Revised goals: None at this time.       Plan:  Continue with established Plan of Care towards PT goals.

## 2019-01-11 NOTE — PROGRESS NOTES
Occupational Therapy Daily Treatment Note     Date: 1/11/2019  Name: Lacey Montana  Clinic Number: [id number]    Therapy Diagnosis:   Encounter Diagnoses   Name Primary?    Decreased range of motion of left shoulder     Impaired motor control Yes     Physician: Juan Pablo Anaya MD     Physician Orders: Evaluate and treat   Medical Diagnosis: History of stroke, recent hospitalization for PNA  Evaluation Date: 12/04/2018  Insurance Authorization Period Expiration: 11/09/19  Plan of Care Certification Period: 1/18/2019  Visit # / Visits authorized:3/ 22 ( 8 visits for this admission in 2018)   Date of Return to MD: none scheduled yet     Time In: 900  Time Out: 945  Total Billable Time: 45 minutes    Precautions:  Standard      Subjective     Pt reports:Nothing new    Response to previous treatment: No pain in L shoulder today but she complains of tightness  Functional change: Able to use RUE for basic self care.     Pain: 0/10  Location: shoulders    Objective     Lacey participated in dynamic functional neuro re-ed to improve functional performance for 45  minutes, including:  Pt ambulated to session with her rollator     Sitting EOM:   -- Mobilization of palm of left hand for stretching and metacarpal flexion and extension    - General wrist stretches including 1. Scaphoid on radius 2. Increasing mobility of metacarpals 3. Carpal rolls 4. Increasing mobility towards radial deviation 5. Increasing mobility of wrist towards extension 6. Increasing mobility of wrist towards supination/pronation  - Left hand placed on rounded surface in ER and toward elbow extension. Pt with digits and wrist in extension for WB activities  --WB on static L arm with body on arm weight shifts with  Scapular mobilization for elevation, depression, adduction and protraction  performed to L scaps.   - supine  And sitting exercises for BWB on ball with body on arm weight shifts to facilitate activitiy  - in sit worked on reach  and grasp with lateral release with max physical cues to open hand and to reach leading with hand not shoulder.   Home Exercises and Education Provided     Education provided:   - Pt states that she is wearing her resting hand splint at home with no difficulties.   - Progress towards goals - Pt. Is active at home with hemiplegic techniques.        Assessment     Ms Montana stated that she has no pain in her L or R shoulder this day. She is determined to improve any functional movement that she can.      Lacey is progressing well towards her goals and there are no updates to goals at this time. Pt prognosis is Good.     Pt will continue to benefit from skilled outpatient occupational therapy to address the deficits listed in the problem list on initial evaluation provide pt/family education and to maximize pt's level of independence in the home and community environment.     Anticipated barriers to occupational therapy: Transportation as she relies on cabs to bring her to therapy.     Pt's spiritual, cultural and educational needs considered and pt agreeable to plan of care and goals.    Goals:  LTG GOALS:  Time frame: 6 WEEKS  Mod I bathing safely on shower chair  I in HEP to prevent overuse in R Shoulder.  Demonstrate knowledge of Energy Conservation and work simplification during ADL and IADL tasks    STG Goals:  Time frame: 3 WEEKS  Lacey will be independent with HEP to improve ROM and normalize tone in LUE and ROM /strength R shoulder. MET  Lacey will safely weight bear through L hand during kitchen and homecare tasks. Progressing  Verbalize knowledge of Energy Conservation and work simplification during ADL and IADL tasks Progressing    Plan     Certification Period/Plan of care expiration: 12/04/2018 to 1/18/2019.    Outpatient Occupational Therapy 2 times weekly for 6 weeks to include the following interventions: Manual Therapy, Neuromuscular Re-ed, Orthotic Management and Training, Patient  Education, Self Care, Therapeutic Activities and Therapeutic Exercise.       Cris Sandoval, OT   1/11/2019

## 2019-01-14 ENCOUNTER — CLINICAL SUPPORT (OUTPATIENT)
Dept: REHABILITATION | Facility: HOSPITAL | Age: 64
End: 2019-01-14
Attending: PHYSICAL MEDICINE & REHABILITATION
Payer: MEDICARE

## 2019-01-14 DIAGNOSIS — M25.612 DECREASED RANGE OF MOTION OF LEFT SHOULDER: ICD-10-CM

## 2019-01-14 DIAGNOSIS — R53.1 LEFT-SIDED WEAKNESS: ICD-10-CM

## 2019-01-14 DIAGNOSIS — Z78.9 IMPAIRED MOTOR CONTROL: Primary | ICD-10-CM

## 2019-01-14 DIAGNOSIS — Z74.09 IMPAIRED FUNCTIONAL MOBILITY, BALANCE, GAIT, AND ENDURANCE: ICD-10-CM

## 2019-01-14 PROCEDURE — 97112 NEUROMUSCULAR REEDUCATION: CPT | Mod: HCNC,PO

## 2019-01-14 PROCEDURE — 97110 THERAPEUTIC EXERCISES: CPT | Mod: HCNC,PO

## 2019-01-14 PROCEDURE — 97140 MANUAL THERAPY 1/> REGIONS: CPT | Mod: HCNC,PO

## 2019-01-14 NOTE — PROGRESS NOTES
Name: Lacey Montana  Clinic Number: [id number]  Date of Treatment: 01/14/2019  Diagnosis:   Encounter Diagnoses   Name Primary?    Left-sided weakness     Impaired functional mobility, balance, gait, and endurance        Physician: Juan Pablo Anaya MD     Time in: 07:00 am   Time Out: 07:55 am   Total Treatment Time: 55 minutes  Last G-code: 12/27/18 (visit 5)  Last PN: NA  Date of eval: 12/4/18  Visit #: 9 / 30  Auth expiration: 11/9/19  POC expiration: 3/4/19    Precautions: possible stress-induced seizures, standard    Subjective:    Lacey Montana reports she has been doing her exercises daily, but still feels weak.  Patient reports their pain to be 0/10 on a 0-10 scale with 0 being no pain and 10 being the worst pain imaginable.       Objective    Lacey Montana was instructed in and performed therapeutic exercises to develop strength, endurance, ROM, flexibility, posture and core stabilization for 45 minutes including:      Hip abduction on slider: x 2 minutes   Standing weight shifts: x 2 minutes   Standing toe taps on right le with therapist blocking lle to prevent buckling: 2 x 10  4inch step  Standing march at counter x 20 ea  Standing hip abd at counter x 20 ea  Sit to stands from elevated surface w feet staggered L foot back to promote wb on RLE  2 x 10  LAQ x 20    Pt participated in the following gait training for 10 minutes:    Amb x 300 feet using SPC with cues for safety and sequencing  +Gait part practice at counter w sb and stepping fwd w R x 20, w L x 15    Written Home Exercises Provided: standing march, standing hip abduction, LAQ, sit <> stand  Pt demo good understanding of the education provided. Lacey Montana demonstrated good return demonstration of activities.       Assessment:     Patient tolerated therapy session well. Expected exercise induced fatigue post treatment session. Patient with difficulty maintaining proper form with therex and requires continuous verbal  and tactile cueing.  Patient required several rest breaks throughout treatment session due to decreased endurance and will continue to benefit from skilled physical therapy to address deficits and improve overall functional mobility.   Pt will continue to benefit from skilled PT intervention. Medical Necessity is demonstrated by:  Fall Risk, Unable to participate in daily activities, Continued inability to participate in vocational pursuits, Pain limits function of effected part for some activities, Unable to participate fully in daily activities, Requires skilled supervision to complete and progress HEP and Weakness.    Patient is making good progress towards established goals.    GOALS: Short Term Goals:  6 weeks  1. Pt will be mod I with all bed mobility to indicate improved functional mobility.- in progress   2. Pt will be able to tolerate multi-directional LE strengthening in order to improve ability to perform household chores.- met 12/27/18  3. Pt will ambulate 100' with LRAD, increased L heel strike, and increased knee flexion to improve gait mechanics.- in progress  4. Pt will report 50% improvement in ability to walk long distances since start of care to indicate improved functional mobility.- in progress   5. Pt to tolerate HEP to improve ROM and independence with ADL's- in progress     Long Term Goals: 12 weeks  1. Pt will perform 20 sit to stands with equal weight bearing to indicate improved transfers.   2. Pt will be able to perform 2 x 10 multi-directional LE strengthening without fatigue in order to improve ability to perform household chores.  3. Pt will report 80% improvement in ability to walk long distances since start of care to indicate improved functional mobility.   4. Pt will ambulate 200' with LRAD and increased R step length to improve gait mechanics.  5. Pt to be Independent with HEP to improve ROM and independence with ADL's    New/Revised goals: None at this time.       Plan:  Continue  with established Plan of Care towards PT goals.     PT/PTA Face to face conference completed 1/24/2019 regarding patients POC and progress towards goals.

## 2019-01-14 NOTE — PROGRESS NOTES
Occupational Therapy Daily Treatment Note     Date: 2019  Name: Lacey Montana  Clinic Number: [id number]    Therapy Diagnosis:   Encounter Diagnoses   Name Primary?    Decreased range of motion of left shoulder     Impaired motor control Yes     Physician: Juan Pablo Anaya MD     Physician Orders: Evaluate and treat   Medical Diagnosis: History of stroke, recent hospitalization for PNA  Evaluation Date: 2018  Insurance Authorization Period Expiration: 19  Plan of Care Certification Period: 2019  Visit # / Visits authorized: ( 8 visits for this admission in 2018)   Date of Return to MD: none scheduled yet     Time In: 815  Time Out: 915  Total Billable Time: 60 minutes    Precautions:  Standard      Subjective     Pt reports:Successfully performing cooking tasks over the weekend. She states she worked on keeping L hand on lap during seated cooking prep task.   Pt states she is determined to be as I as possible.      Response to previous treatment: No pain in L shoulder today but she complains of tightness  Functional change: Able to use RUE for basic self care.     Pain: 0/10  Location: shoulders    Objective     Lacey participated in dynamic functional neuro re-ed to improve functional performance for 30  Minutes and manual therapy 30 minutes for hand and shoulder mobilization including:  Pt ambulated to session with her rollator     Sitting EOM:   - Mobilization/stretching of palm of L hand (metacarpal flex/ext)  - General wrist stretches includin. Increasing mobility of wrist towards flex/ext 2. Increasing mobility of metacarpals 3. Palmar arch spread  - L UE placed in GG splint in ext rotation, digits/wrist in ext and slight elbow flex for WB activities and shoulder/scapular mobs  - L scapular mobilization (elevation, depression, adduction and protraction) and posterior shoulder capsule stretch x2 in seated then in sidelying  - WB activities for L UE in sidelying: L UE  splinted in GG elbow flex, digits/wrist ext while flex-ext L LE then lifting and lowering UB by pressing through BUEs  - Pt stood with splinted L UE placed on slanted board, Body on static arm weight shifts for L arm shoulder/elbow flexion and extension, humeral ab/ad duction and horizontal ab/ad duction.  Mod  Physical assist and vocal cueing for proper alignment and movement.  - Dynamic seated activity reaching to midline with L UE, grasping object and releasing into basket on L side with Max assist to cue grasp and release and verbal cues to maintain proper shoulder alignment     Home Exercises and Education Provided     Education provided:   - Pt encouraged to remain active and engaged in ADLs/IADLs  - Progress towards goals   - Pt. Is active at home with hemiplegic techniques.    Reminded to use soft items placed in L hand to work on grasp/release at home.     Assessment     Ms Montana stated that she has no pain in her L or R shoulder this day. She continues to substitute L shoulder movements and trunk to move L arm and hand.  She is determined to continue improving functional movement but has good awareness of her disability. Despite this reality, she remains resilient and committed to her daily routines.       Lacey is progressing well towards her goals and there are no updates to goals at this time. Pt prognosis is Good.     Pt will continue to benefit from skilled outpatient occupational therapy to address the deficits listed in the problem list on initial evaluation provide pt/family education and to maximize pt's level of independence in the home and community environment.     Anticipated barriers to occupational therapy: Transportation as she relies on cabs to bring her to therapy.     Pt's spiritual, cultural and educational needs considered and pt agreeable to plan of care and goals.    Goals:  LTG GOALS:  Time frame: 6 WEEKS  Mod I bathing safely on shower chair  I in HEP to prevent overuse in R  Shoulder.  Demonstrate knowledge of Energy Conservation and work simplification during ADL and IADL tasks    STG Goals:  Time frame: 3 WEEKS  Lacey will be independent with HEP to improve ROM and normalize tone in LUE and ROM /strength R shoulder. MET  Lacey will safely weight bear through L hand during kitchen and homecare tasks. Progressing  Verbalize knowledge of Energy Conservation and work simplification during ADL and IADL tasks Progressing    Plan     Certification Period/Plan of care expiration: 12/04/2018 to 1/18/2019.    Outpatient Occupational Therapy 2 times weekly for 6 weeks to include the following interventions: Manual Therapy, Neuromuscular Re-ed, Orthotic Management and Training, Patient Education, Self Care, Therapeutic Activities and Therapeutic Exercise.       Cris Sandoval, OT   1/14/2019

## 2019-01-18 ENCOUNTER — CLINICAL SUPPORT (OUTPATIENT)
Dept: REHABILITATION | Facility: HOSPITAL | Age: 64
End: 2019-01-18
Attending: PHYSICAL MEDICINE & REHABILITATION
Payer: MEDICARE

## 2019-01-18 DIAGNOSIS — M25.612 DECREASED RANGE OF MOTION OF LEFT SHOULDER: ICD-10-CM

## 2019-01-18 DIAGNOSIS — I63.9 CEREBROVASCULAR ACCIDENT (CVA), UNSPECIFIED MECHANISM: ICD-10-CM

## 2019-01-18 DIAGNOSIS — Z78.9 IMPAIRED MOTOR CONTROL: Primary | ICD-10-CM

## 2019-01-18 PROCEDURE — 97530 THERAPEUTIC ACTIVITIES: CPT | Mod: HCNC,PO | Performed by: OPTOMETRIST

## 2019-01-18 PROCEDURE — G8988 SELF CARE GOAL STATUS: HCPCS | Mod: CJ,HCNC,PO | Performed by: OPTOMETRIST

## 2019-01-18 PROCEDURE — G8989 SELF CARE D/C STATUS: HCPCS | Mod: CJ,HCNC,PO | Performed by: OPTOMETRIST

## 2019-01-18 NOTE — PROGRESS NOTES
Occupational Therapy Daily Treatment Note / Discharge Summary      Date: 1/18/2019  Name: Lacey Montana  Clinic Number: [id number]    Therapy Diagnosis:   Encounter Diagnoses   Name Primary?    Cerebrovascular accident (CVA), unspecified mechanism     Decreased range of motion of left shoulder     Impaired motor control Yes     Physician: Juan Pablo Anaya MD     Physician Orders: Evaluate and treat   Medical Diagnosis: History of stroke, recent hospitalization for PNA  Evaluation Date: 12/04/2018  Insurance Authorization Period Expiration: 11/09/19  Plan of Care Certification Period: 1/18/2019  Visit # / Visits authorized:5/ 30 ( 8 visits for this admission in 2018)   Date of Return to MD: none scheduled yet     Time In: 0900  Time Out: 0945  Total Billable Time: 45 minutes    Precautions:  Standard      Subjective     Pt reports: That she had to again postpone her visit to the orthotist for her fitting for a new AFO. She is performing more tasks at home using her L UE in a supportive role.      Response to previous treatment: No pain in L shoulder today and no complaints of tightness  Functional change: Able to use RUE for basic self care.     Pain: 0/10  Location: shoulders    Objective     Lacey participated in dynamic functional therapeutic activities to improve functional performance for 45  minutes, including:  Pt ambulated to session with her rollator     Sitting EOM:   -- Mobilization of palm of left hand for stretching and metacarpal flexion and extension    - General wrist stretches including 1. Scaphoid on radius 2. Increasing mobility of metacarpals 3. Carpal rolls 4. Increasing mobility towards radial deviation 5. Increasing mobility of wrist towards extension 6. Increasing mobility of wrist towards supination/pronation  - Left hand placed on hard surface on mat next to Pt with digits and wrist in extension as Pt was given cues to lean forward to promote increased extension on her own  -  Application of GG paddle splint   - Scapular mobilization for elevation, depression, adduction and protraction where AAROM was performed to her Left side.  - Assisted shoulder flexion from sitting at EOM combined with assist for scapular rotation  - Pt was able to move to supine on her own.      From supine:   - Review of AAROM to her L UE while using her R UE to assist for control and range for shoulder flexion. This is part of her HEP    Pt returned to sitting at EOM:  - Leaning forward for with her R UE assisting her L UE for reach to the floor to promote L shoulder AAROM and L elbow extension.   - GG paddle splint removed   - Pt assessed for digit movement as she has Trace for digit flexion and no movement for extension. She has Poor movement for wrist extension.     Pt brought to standing at counter:   - With L hand placed on washcloth, and assisted with R hand on top her L hand she performed forward and lateral towel glides to both her L and R to promote shoulder flexion and elbow extension. Pt expressed understanding and was able to perform on her own.     FOTO survey was given with results as follows:   CMS Impairment/Limitation/Restriction for FOTO Cerebrovascular Disorders Survey  Status Limitation G-Code CMS Severity Modifier  Intake 50% 50%  Predicted 54% 46% Goal Status+ CK - At least 40 percent but less than 60 percent  12/27/2018 63% 37%  1/18/2019 62% 38% Current Status CJ - At least 20 percent but less than 40 percent  D/C Status CJ **only report if this is discharge survey  +Based on FOTO predicted change score    Home Exercises and Education Provided     Education provided:   - Pt encouraged to remain active and engaged in ADLs/IADLs  - Pt. Is active at home with hemiplegic techniques.    Reminded to use soft items placed in L hand to work on grasp/release at home.  -Review of all HEP for self ROM where Pt was able to demonstrate all and explain using teach back method.      Assessment     Ms  Rubén had no complaints of pain in her shoulder this day and was able to perform all of her HEP for shoulder ROM on her own. She continues to substitute L shoulder movements and trunk to move L arm and hand. Unfortunately she has made little gains with functional movement in her L hand but compensates very well with her R UE to perform ADLs and uses her L hand for a supportive role in ADLs and functional mobility. These limited gains were addressed with her and she expressed understanding. Physical Therapy will be contacted to follow up on her appointment for a new L AFO. She has progressed toward goals as follows and is no longer in need of OutPt OT services at this time.     Goals:  LTG GOALS:  Time frame: 6 WEEKS  Mod I bathing safely on shower chair - MET  I in HEP to prevent overuse in R Shoulder. - MET  Demonstrate knowledge of Energy Conservation and work simplification during ADL and IADL tasks - MET     STG Goals:  Time frame: 3 WEEKS  Lacey will be independent with HEP to improve ROM and normalize tone in LUE and ROM /strength R shoulder. MET  Lacey will safely weight bear through L hand during kitchen and home care tasks. MET  Verbalize knowledge of Energy Conservation and work simplification during ADL and IADL tasks MET     Plan     Certification Period/Plan of care expiration: 12/04/2018 to 1/18/2019.    Discharge Pt from OutPt OT services.       IRIS Lopez   1/18/2019

## 2019-01-24 ENCOUNTER — CLINICAL SUPPORT (OUTPATIENT)
Dept: REHABILITATION | Facility: HOSPITAL | Age: 64
End: 2019-01-24
Attending: PHYSICAL MEDICINE & REHABILITATION
Payer: MEDICARE

## 2019-01-24 DIAGNOSIS — R53.1 LEFT-SIDED WEAKNESS: ICD-10-CM

## 2019-01-24 DIAGNOSIS — Z74.09 IMPAIRED FUNCTIONAL MOBILITY, BALANCE, GAIT, AND ENDURANCE: ICD-10-CM

## 2019-01-24 PROCEDURE — G8978 MOBILITY CURRENT STATUS: HCPCS | Mod: CK,HCNC,PO

## 2019-01-24 PROCEDURE — G8980 MOBILITY D/C STATUS: HCPCS | Mod: CK,HCNC,PO

## 2019-01-24 PROCEDURE — 97116 GAIT TRAINING THERAPY: CPT | Mod: HCNC,PO

## 2019-01-24 PROCEDURE — 97110 THERAPEUTIC EXERCISES: CPT | Mod: HCNC,PO

## 2019-01-24 NOTE — PROGRESS NOTES
Name: Lacey Montana  Clinic Number: [id number]  Date of Treatment: 01/24/2019  Diagnosis:   Encounter Diagnoses   Name Primary?    Left-sided weakness     Impaired functional mobility, balance, gait, and endurance        Physician: Juan Pablo Anaya MD     Time in: 1000  Time Out: 1055  Total Treatment Time: 55 minutes  Last G-code/PN: 1/24/19 (visit 10)  Date of eval: 12/4/18  Visit #: 10 / 30  Auth expiration: 11/9/19  POC expiration: 3/4/19    Precautions: possible stress-induced seizures, standard    Subjective:    Lacey Montana reports she has been tired from doctor visits this week in preparation for removal of kidney stones on the 28th. She is compliant with HEP. 60% improvement in ability to walk long distances since start of care. No recent falls but gets dizzy when getting up. Patient reports their pain to be 0/10 on a 0-10 scale with 0 being no pain and 10 being the worst pain imaginable.       Objective    Lacey Montana was instructed in and performed therapeutic exercises to develop strength, endurance, ROM, flexibility, posture and core stabilization for 45 minutes including:      Hip abduction on slider: x 2 minutes- NP  Standing weight shifts: x 2 minutes   Standing toe taps on right le with therapist blocking lle to prevent buckling: 2 x 10  4inch step- NP  Standing march at counter x 20 ea  Standing hip abd at counter x 20 ea  Sit to stands from elevated surface w feet staggered L foot back to promote wb on RLE  2 x 10  LAQ x 20  +Bridges x 20  +SLR x 20   +Step up 6 in x 20    Pt participated in the following gait training for 10 minutes:    Amb x 200 feet, x 100 feet using SPC with cues for safety and sequencing  Gait part practice at counter w sb and stepping fwd w R x 20, w L x 15- NP    Written Home Exercises Provided: standing march, standing hip abduction, LAQ, sit <> stand  Pt demo good understanding of the education provided. Lacey Montana demonstrated good return  demonstration of activities.     Functional Limitation Report- G-CODE:  Current - 40-60% Impairment CK []  Discharge - 40-60% Impairment CK []    Assessment:     Lacey was re-assessed today with 5/5 STGs and 1/5 LTGs being met indicating improvements in independence with bed mobility, gait mechanics, ability to walk long distances, and independence with HEP since start of care. Pt demonstrates plateau in progress and continues with difficulty with transfers using L LE, L LE weakness, difficulty walking long distances, and impaired gait mechanics. She had good tolerance to treatment today with no adverse effects. Appropriate exercise-induced fatigue achieved by end of session. She requires heavy manual and verbal cueing to increase use of L LE with transfers and ambulation. Little carryover noted. Challenged with step ups and L multi-directional hip strengthening. Pt to be discharged from physical therapy services at this time with updated HEP for self-management of condition.     GOALS: Short Term Goals:  6 weeks  1. Pt will be mod I with all bed mobility to indicate improved functional mobility.- met 1/24/19  2. Pt will be able to tolerate multi-directional LE strengthening in order to improve ability to perform household chores.- met 12/27/18  3. Pt will ambulate 100' with LRAD, increased L heel strike, and increased knee flexion to improve gait mechanics.- met 1/24/19  4. Pt will report 50% improvement in ability to walk long distances since start of care to indicate improved functional mobility.- met 1/24/19  5. Pt to tolerate HEP to improve ROM and independence with ADL's- met 1/24/19     Long Term Goals: 12 weeks  1. Pt will perform 20 sit to stands with equal weight bearing to indicate improved transfers.- not met   2. Pt will be able to perform 2 x 10 multi-directional LE strengthening without fatigue in order to improve ability to perform household chores.- not met  3. Pt will report 80%  improvement in ability to walk long distances since start of care to indicate improved functional mobility.- not met   4. Pt will ambulate 200' with LRAD and increased R step length to improve gait mechanics.- not met  5. Pt to be Independent with HEP to improve ROM and independence with ADL's- met 1/24/19    New/Revised goals: None at this time.     Plan:    Pt is discharged from physical therapy services.

## 2019-05-15 ENCOUNTER — PES CALL (OUTPATIENT)
Dept: ADMINISTRATIVE | Facility: CLINIC | Age: 64
End: 2019-05-15

## 2019-08-28 ENCOUNTER — PES CALL (OUTPATIENT)
Dept: ADMINISTRATIVE | Facility: CLINIC | Age: 64
End: 2019-08-28

## 2019-09-27 DIAGNOSIS — M20.21 HALLUX RIGIDUS OF RIGHT FOOT: ICD-10-CM

## 2019-09-27 DIAGNOSIS — M72.2 PLANTAR FASCIITIS: ICD-10-CM

## 2019-09-27 RX ORDER — DICLOFENAC SODIUM 10 MG/G
GEL TOPICAL
Qty: 300 G | Refills: 1 | Status: SHIPPED | OUTPATIENT
Start: 2019-09-27

## 2020-04-17 NOTE — PATIENT INSTRUCTIONS
Addended by: FIORELLA STEIN on: 4/17/2020 01:31 PM     Modules accepted: Orders     Discharge Summary/Instructions for after Colonoscopy without   Biopsy/Polypectomy  Patient Name: Lacey Montana  Patient MRN: [id number]  Patient YOB: 1955 Thursday, June 08, 2017    Fabio Capone MD  RESTRICTIONS ON ACTIVITY:  - Do not drive a car or operate machinery until the day after the procedure.      - The following day: return to full activity including work.  - Diet: Eat and drink normally unless instructed otherwise.  TREATMENT FOR COMMON SIDE EFFECTS:  - Mild abdominal pain and bloating or excessive gas: walk, eat lightly, and   use a heating pad.  SYMPTOMS TO WATCH FOR AND REPORT TO YOUR PHYSICIAN:  1. Severe abdominal pain.  2. Fever greater than 101 degrees F within 24 hours after a procedure.  3. A large amount of rectal bleeding. (A small amount of blood from the   rectum is not serious, especially if hemorrhoids are present.  3.  Because air was put into your colon during the procedure, expelling   large amounts of air from your rectum is normal.  4.  You may not have a bowel movement for 1-3 days because of the   colonoscopy prep.  This is normal.  5.  Call you Doctor or go to the emergency room if you notice any of the   following:   Chills and/or fever over 101   Persistent vomiting   Severe abdominal pain, other than gas cramps   Severe chest pain   Black, tarry stools   Any bleeding - exceeding one cup  Your doctor recommends these additional instructions:  You are being discharged to home.   Eat a high fiber diet daily.   Patient may restart Agronox at normal dose today  Your physician has recommended a repeat colonoscopy in 10 years for   screening purposes.  If you have any questions or problems, please call your physician.  COLON AND RECTAL SURGERY OFFICE:  [phone]  EMERGENCY PHONE NUMBER: [phone]  Fabio Capone MD  6/8/2017 8:15:44 AM  This report has been verified and signed electronically.

## 2020-07-16 ENCOUNTER — TELEPHONE (OUTPATIENT)
Dept: FAMILY MEDICINE | Facility: CLINIC | Age: 65
End: 2020-07-16

## 2020-07-16 NOTE — TELEPHONE ENCOUNTER
Bravo Montana    This is Faina calling from [facility]. Im reaching out to schedule you enhancement annual wellness visit that you insurance require you to do every 12 months please call the office back at [phone] to schedule or you have any questions

## 2020-07-30 ENCOUNTER — TELEPHONE (OUTPATIENT)
Dept: INTERNAL MEDICINE | Facility: CLINIC | Age: 65
End: 2020-07-30

## 2020-07-30 NOTE — TELEPHONE ENCOUNTER
lvm for pt to call office back in regards of     This is Faina from [facility] calling to schedule an Enhance Annual Wellness Visit with our NP here a [facility]. This is an appointment that your insurance would like for you to have yearly as well a long with your annual with your pcp. Please give the office and Call back at [phone] ask to send a message to Faina to get this appointment schedule.      Thank you  BRIANNA Eisenberg

## 2020-08-20 ENCOUNTER — PES CALL (OUTPATIENT)
Dept: ADMINISTRATIVE | Facility: CLINIC | Age: 65
End: 2020-08-20

## 2020-10-06 ENCOUNTER — CLINICAL SUPPORT (OUTPATIENT)
Dept: REHABILITATION | Facility: OTHER | Age: 65
End: 2020-10-06
Payer: MEDICARE

## 2020-10-06 DIAGNOSIS — M62.89 PELVIC FLOOR DYSFUNCTION: ICD-10-CM

## 2020-10-06 PROCEDURE — 97162 PT EVAL MOD COMPLEX 30 MIN: CPT

## 2020-10-06 PROCEDURE — 97535 SELF CARE MNGMENT TRAINING: CPT

## 2020-10-06 NOTE — PATIENT INSTRUCTIONS
Home Program 10/6/20:    Urge Suppression Techniques:    1. Be still - stop what you are doing. If seated, stay seated. If standing, stand still or sit down if you can.    2. Belly breathing - Do 5-10 slow belly breath    3. Kegels/Quick Flicks  - Do 5-10 contractions/relaxations of the pelvic floor muscles    4. Go with control, walk calmly to the toilet.  - When the urge has subsided, walk calmly to the bathroom. If needed, stop and repeat the above steps.      Use the above techniques to see if you can reduce or calm the feeling of urgency. These may help to make you more comfortable as well as increase the time between trips to the bathroom.    Bladder diaries x 3 days

## 2020-10-06 NOTE — PLAN OF CARE
[facility]  Pelvic Health Physical Therapy Initial Evaluation    Date: 10/6/2020   Name: Lacey Montana  Clinic Number: [id number]  Therapy Diagnosis: No diagnosis found.  Physician: Radha Fuchs MD    Physician Orders: PT Eval and Treat  Medical Diagnosis from Referral: pelvic floor dysfunction  Evaluation Date: 10/6/2020  Authorization Period Expiration: 1 visit 11/2/20  Plan of Care Expiration: 1/4/20  Visit # / Visits authorized: 1/ 1    Time In: 8:30  Time Out: 9:30  Total Appointment Time (timed & untimed codes): 60 minutes    Precautions: universal    Subjective     Date of onset: 2 years ago    History of current condition - Lacey reports: Has frequency, urgency, and difficulty completely emptying. Also having UUI if holds too long. Needs to sit 10 minutes each time to feel like she gets it all out, then has to return 10 minutes later. After that may have 15-20 minutes before needing to go back. At night, voids almost every hour. Its urge to void which wakes her up. Ends up sleeping more during day. Tried Myrebetriq but discontinued due to SEs. Had a bad UTI about a month ago, had antibiotic which didn't help. Started using estrogen cream and that helped with burning but still having increased urgency. Has noticed that walking has gotten worse in past 2 years, thinks related to the AFO. Feels like loosing balance more with standing, ambulation feels harder because flexing more. Has commode outside of bathroom which she can use if she cannot make it to toilet. Sometimes doesn't make it to commode and has leakage. Almost had fall recently trying to get to the toilet. End of 2018 had URI and hospitalized for 4 days, urinary sx got worse after that. Drinks a lot of fluid because wakes up with dry mouth.  Self care: prayer, enjoys mysteries on TV, reads bible  PMHx: aneurysm 2003 with craniotomy, cranioplasty in 2005 with L hemiparesis - uses AFO and rollator in community, no AD when at  home, seizures (on keppra for mgmt) haven't had one in 7 months    OB/GYN History: , vaginal delivery (first 2) and caesarean (last one)  Birth Control: menopause  Sexually active? No   Pain with vaginal exams, intercourse or tampon use? Yes - hx of    Bladder/Bowel History: trouble emptying bladder completely, recurrent bladder infections and urinary incontinence   Frequency of urination:   Daytime: varies, every 10 -30 minutes           Nighttime: every hour   Difficulty initiating urine stream: No   Urine stream: strong then trickle   Complete emptying: No   Bladder leakage: Yes   Frequency of incidents/Type of incontinence: few times/day; UUI only   Amount leaked (urine): full emptying - could happen if doesn't get to bathroom   Urinary Urgency: Yes   Frequency of bowel movements: once every 1-2 days   Difficulty initiating BM: Yes   Quality/Shape of BM: [place] Stool Chart 1, 2, 4, or 5   Does Patient Feel Empty after BM? Yes - sometimes   Fiber Supplements or Laxative Use? Yes - takes fiber daily (2-3 months, hasn't noticed any improvements)   Colon leakage: No   Frequency of incidents: n/a    Fecal Urgency: No   Form of protection: pad   Number of pads required in 24 hours: 0-1 (has them just in case)   History of coccyx injury: no    Pain:  Denies pain     Medical History: Lacey  has a past medical history of Anticoagulant long-term use, Encounter for blood transfusion, GERD (gastroesophageal reflux disease), Seizures, and Stroke.     Surgical History: Lacey Montana  has a past surgical history that includes Brain surgery (, ); Hysterectomy; Colonoscopy (N/A, 2017); and Oophorectomy.    Medications: Lacey has a current medication list which includes the following prescription(s): diclofenac sodium, dipyridamole-aspirin 200-25 mg, epinephrine, fluarix quad [id number] (pf), hydrochlorothiazide, levetiracetam, pneumovax-23, topiramate, topiramate, and triamcinolone  acetonide 0.1%.    Allergies:   Review of patient's allergies indicates:   Allergen Reactions    Chocolate flavor     Codeine Rash    Percocet [oxycodone-acetaminophen] Rash          Prior Therapy/Previous treatment included: none  Social History:  lives alone, occasionally watches [employer]  Current exercise: walks short distances (down hallway 2x/day)  Occupation: not working  Prior Level of Function: increased participation in ADLs  Current Level of Function: severely limited participation in ADLs due to high urinary frequency, increased time on toilet, incontinence, increased risk of falls due to urgency; limits prolonged walking    Types of fluid intake: water 48-64 ounces, green tea + peach tea and lemonade 48 ounces with sugar, sports drinks (propel) - 24 ounces  Diet: TBA  Habitus: well developed, well nourished  Abuse/Neglect: Yes, no therapy     Pts goals: wants to avoid having to use diaper    OBJECTIVE     See EMR under MEDIA for written consent provided 10/6/2020  Chaperone: Declined    ORTHO SCREEN  Posture in sitting: slouched   Posture in standing: lateral trunk flexion to the right, left leg abducted  Pelvic alignment: not assessed in supine today     ABDOMINAL WALL ASSESSMENT  Not assessed today     BREATHING MECHANICS ASSESSMENT   Not assessed today    VAGINAL PELVIC FLOOR EXAM    EXTERNAL ASSESSMENT  Not assessed today      INTERNAL ASSESSMENT  Not assessed today    TREATMENT     Treatment Time In: 9:10  Treatment Time Out: 9:30  Total Treatment time (time-based codes) separate from Evaluation: 20 minutes    Self-Care for 20 minutes including:   Education on urge suppression techniques and prescription of bladder diaries     Patient Education provided:   general anatomy/physiology of urinary/ bowel  system and benefits of treatment were discussed with the pt. Additionally, anatomy/physiology of pelvic floor and bladder retraining were reviewed.     Home Exercises provided:  Written Home  Exercises provided: Yes  Exercises were reviewed and Lacey was able to demonstrate them prior to the end of the session.    Lacey demonstrated good  understanding of the education provided.     See EMR under Patient Instructions for exercises provided 10/6/2020.    Assessment     Lacey is a 65 y.o. female referred to outpatient Physical Therapy with a medical diagnosis of pelvic floor dysfunction. Pt presents with altered posture, poor trunk stability, increased tension of the pelvic muscles, poor quality of pelvic muscle contraction, increased frequency of urination, increased nocturia, poor coordination of pelvic floor muscles during ADL's leading to urinary or fecal leakage and incomplete urination. Pelvic exam not completed due to time constraints, will be performed at next session. Significant time spent on education and discussion of complex medical history. Pt educated on urge suppression techniques and prescribed bladder diaries to further assess pattern of symptoms of potential behavioral contributors. Pt reports high fluid intake and may benefit from decreasing fluids, but will make formal recommendation based on diaries. Due to mobility issues and reports of recent LOB episodes believe pt's symptoms of urinary urgency, frequency, nocturia and UUI are contributing to increased fall risk.      Pt prognosis is Excellent  Pt will benefit from skilled outpatient Physical Therapy to address the deficits stated above and in the chart below, provide pt/family education, and to maximize pt's level of independence.     Plan of care discussed with patient: Yes  Pt's spiritual, cultural and educational needs considered and patient is agreeable to the plan of care and goals as stated below:     Anticipated Barriers for therapy: None    Medical Necessity is demonstrated by the following:    History  Co-morbidities and personal factors that may impact the plan of care Co-morbidities   chronic constipation,  consumption of bladder irritants, history of TBI and recurrent urinary infections    Personal Factors  no deficits     high   Examination  Body structures and functions, activity limitations and participation restrictions that may impact the plan of care Body Regions/Systems/Functions:  altered posture, poor trunk stability, increased tension of the pelvic muscles, poor quality of pelvic muscle contraction, increased frequency of urination, increased nocturia, poor coordination of pelvic floor muscles during ADL's leading to urinary or fecal leakage and incomplete urination     Activity Limitations:  urgency , delaying urge to urinate, full bladder emptying, sleep uninterrupted by excessive nocturia and incontinence with ADLs    Participation Restrictions:  all ADLs/iADLs uninterrupted by urinary incontinence/urgency/frequency and Sleep restrictions    Activity limitations:   Learning and applying knowledge  No deficits    General Tasks and Commands  No deficits    Communication  No deficits    Mobility  No deficits    Self care  No deficits    Domestic Life  No deficits    Interactions/Relationships  No deficits    Life Areas  No deficits    Community and Social Life  No deficits       high   Clinical Presentation evolving clinical presentation with changing clinical characteristics moderate   Decision Making/ Complexity Score: moderate       Goals:  Short Term Goals: 4 weeks   Pt indep in HEP  Pt indep in functional brace technique for decreased strain of pelvic structures or UI with activities which increase IAP  Pt able to wait at least 1 hour between trips to the bathroom for improved participation in ADLs  Pt demo complete contraction and deactivation of pelvic floor muscles x 10 reps  Nocturia no more than 3x/night for improved quality of sleep and decreased nighttime fall risk    Long Term Goals: 8 weeks   Pt indep in progressive HEP  Pt reports 0 episodes of leakage in at least 2 weeks for improved ADL  participation and decreased risk of skin breakdown  Pt able to wait at least 2.5 hours between trips to the bathroom for improved participation in ADLs and decreased fall risk due to urgency  Nocturia no more than 1x/night for improved quality of sleep and decreased nighttime fall risk      Plan     Plan of care Certification: 10/6/2020 to 1/4/21.    Outpatient Physical Therapy 1 times weekly for 8 weeks to include the following interventions: therapeutic exercises, therapeutic activity, neuromuscular re-education, manual therapy, patient/family education and self care/home management    Yue Escobar, PT

## 2020-11-05 ENCOUNTER — CLINICAL SUPPORT (OUTPATIENT)
Dept: REHABILITATION | Facility: OTHER | Age: 65
End: 2020-11-05
Attending: NURSE PRACTITIONER
Payer: MEDICARE

## 2020-11-05 DIAGNOSIS — N81.89 PELVIC FLOOR WEAKNESS IN FEMALE: ICD-10-CM

## 2020-11-05 DIAGNOSIS — Z78.9 IMPAIRED MOTOR CONTROL: Primary | ICD-10-CM

## 2020-11-05 DIAGNOSIS — R27.9 LACK OF COORDINATION: ICD-10-CM

## 2020-11-05 DIAGNOSIS — M25.612 DECREASED RANGE OF MOTION OF LEFT SHOULDER: ICD-10-CM

## 2020-11-05 PROCEDURE — 97530 THERAPEUTIC ACTIVITIES: CPT

## 2020-11-05 PROCEDURE — 97112 NEUROMUSCULAR REEDUCATION: CPT

## 2020-11-05 NOTE — PROGRESS NOTES
Pelvic Health Physical Therapy   Treatment Note     Name: Lacey Montana  Clinic Number: [id number]    Therapy Diagnosis:   Encounter Diagnoses   Name Primary?    Impaired motor control Yes    Decreased range of motion of left shoulder     Lack of coordination     Pelvic floor weakness in female      Physician: Adri Shah NP    Visit Date: 11/5/2020    Physician Orders: PT Eval and Treat  Medical Diagnosis from Referral: pelvic floor dysfunction  Evaluation Date: 10/6/2020  Authorization Period Expiration: 20 visits through 4/27/21  Plan of Care Expiration: 1/4/20  Visit # / Visits authorized: 1/ 20     Cancelled Visits: 0  No Show Visits: 0    Time In: 9:30  Time Out: 10:15  Total Billable Time: 45 minutes    Precautions: Standard    Subjective     Pt reports: Left diaries on her bed. Still having high frequency. Only time she didn't have it was when lights went out and she was drinking less water. Got more rest at those times, sleeping through the night. Has started wearing pads, going through 2/day. Worst leakage is UUI right at toilet.   She was compliant with home exercise program.  Response to previous treatment: felt fine  Functional change: no change    Pain: 0/10  Location:     Objective     Lacey received therapeutic exercises to develop  strength and endurance for 00 minutes including: not performed today    Lacey received the following manual therapy techniques: not performed today    Lacey participated in neuromuscular re-education activities to develop Coordination, Control and Proprioception for 15 minutes including: pelvic floor muscle contractions    Lacey participated in dynamic functional therapeutic activities to improve functional performance for 30 minutes, including:  Education on fluid intake: mount, reducing to ~90 ounces, cessation fluid two hours before bed, mostly water, educated on irritants and how they contribute to symptoms       Home Exercises Provided  and Patient Education Provided     Education provided:   - anatomy/physiology of pelvic floor, kegels and fluid intake/dietary modifications  Discussed progression of plan of care with patient; educated pt in activity modification; reviewed HEP with pt. Pt demonstrated and verbalized understanding of all instruction and was provided with a handout of HEP (see Patient Instructions).    Written Home Exercises Provided: yes.  Exercises were reviewed and Lacey was able to demonstrate them prior to the end of the session.  Lacey demonstrated good  understanding of the education provided.     See EMR under Patient Instructions for exercises provided 11/5/2020.    Assessment     Pt forgot bladder diaries but reviewed fluid intake and made recs on behavioral changes. Pelvic exam completed and instructed in PFME. She had good activation of mm, no overactivity, but poor strength, endurance and coordination.  Lacey is progressing well towards her goals.   Pt prognosis is Excellent.     Pt will continue to benefit from skilled outpatient physical therapy to address the deficits listed in the problem list box on initial evaluation, provide pt/family education and to maximize pt's level of independence in the home and community environment.     Pt's spiritual, cultural and educational needs considered and pt agreeable to plan of care and goals.     Anticipated barriers to physical therapy: none    Goals:   Short Term Goals: 4 weeks   Pt indep in HEP  Pt indep in functional brace technique for decreased strain of pelvic structures or UI with activities which increase IAP  Pt able to wait at least 1 hour between trips to the bathroom for improved participation in ADLs  Pt demo complete contraction and deactivation of pelvic floor muscles x 10 reps  Nocturia no more than 3x/night for improved quality of sleep and decreased nighttime fall risk     Long Term Goals: 8 weeks   Pt indep in progressive HEP  Pt reports 0  episodes of leakage in at least 2 weeks for improved ADL participation and decreased risk of skin breakdown  Pt able to wait at least 2.5 hours between trips to the bathroom for improved participation in ADLs and decreased fall risk due to urgency  Nocturia no more than 1x/night for improved quality of sleep and decreased nighttime fall risk      Plan     Review PFME verbally, review urge suppression, review diaries    Yue Escobar, PT

## 2020-11-05 NOTE — PATIENT INSTRUCTIONS
Home Program 11/5/20    Think about total fluid intake - try to stick around 90 ounces or less total fluid/day. You want the majority of that 90 ounces to be plain old water, nothing added. Try instead of full bottle of lemonade or sparkling water, just having a glass. Also try to avoid drinking fluid in the evening and try to stop most fluid intake 2 hours before bed. If you are having dry mouth try just taking sips of water or sucking on ice chips.    Bring diaries to next session    Pelvic exercises - squeeze pelvic muscles by closing around the openings and pulling up and in. Do two types of squeezes:  1) Squeeze and relax. Do 3 sets of 10/day.  2) Squeeze and hold 5 seconds, relax. Do 2 sets of 10/day.

## 2020-11-10 ENCOUNTER — CLINICAL SUPPORT (OUTPATIENT)
Dept: REHABILITATION | Facility: OTHER | Age: 65
End: 2020-11-10
Attending: NURSE PRACTITIONER
Payer: MEDICARE

## 2020-11-10 DIAGNOSIS — R27.9 LACK OF COORDINATION: Primary | ICD-10-CM

## 2020-11-10 DIAGNOSIS — N81.89 PELVIC FLOOR WEAKNESS IN FEMALE: ICD-10-CM

## 2020-11-10 PROCEDURE — 97110 THERAPEUTIC EXERCISES: CPT

## 2020-11-10 PROCEDURE — 97140 MANUAL THERAPY 1/> REGIONS: CPT

## 2020-11-10 PROCEDURE — 97530 THERAPEUTIC ACTIVITIES: CPT | Mod: 59

## 2020-11-10 PROCEDURE — 97112 NEUROMUSCULAR REEDUCATION: CPT

## 2020-11-10 NOTE — PATIENT INSTRUCTIONS
Home Program 11/10/20:    1) Pelvic exercises - contract and relax pelvic floor muscles 10 times in a row. Do these sets 5 times throughout the day. Try for a set first thing in morning, mid-morning, lunch, mid-afternoon and evening.  2) Breathing exercise - Inhale long slow and deep so belly expands, then exhale with some force as if blowing through a straw. Repeat 10 times. DO a set of these in morning and evening.

## 2020-11-10 NOTE — PROGRESS NOTES
Pelvic Health Physical Therapy   Treatment Note     Name: Lacey Montana  Clinic Number: [id number]    Therapy Diagnosis:   No diagnosis found.  Physician: Adri Shah NP    Visit Date: 11/10/2020    Physician Orders: PT Eval and Treat  Medical Diagnosis from Referral: pelvic floor dysfunction  Evaluation Date: 10/6/2020  Authorization Period Expiration: 20 visits through 4/27/21  Plan of Care Expiration: 1/4/20  Visit # / Visits authorized: 2/ 20     Cancelled Visits: 0  No Show Visits: 0    Time In: 8:10  Time Out: 9:05  Total Billable Time: 55 minutes    Precautions: Standard    Subjective     Pt reports: Forgot diaries again, will bring next time. Has been doing exercises, sets of 10. When she does them notices she can wait longer in between trips to the bathroom, can wait up to 2 hours. But if she doesn't do them has increased frequency, for instance if doesn't do the exercises in the evening may go up to 3x/hour. Working on reducing drinking, but really hasn't cut back much because of dry mouth and feeling of burning in chest from GERD. Takes omeprazole (has been on it for a long time), and uses elevation pillow but not well managed. Thinks omeprazole stopped working. She took herself off it last year because it wasn't working but her doctor put her back on and increased the dose.   She was compliant with home exercise program.  Response to previous treatment: felt fine  Functional change: no change    Pain: 0/10  Location:     Objective     Lacey received therapeutic exercises to develop  strength and endurance for 00 minutes including: not performed today    Lacey received the following manual therapy techniques for 15 minutes: myofascial release infrasternal angle soft tissue, visceral mob pyloric sphincter    Lacey participated in neuromuscular re-education activities to develop Coordination, Control and Proprioception for 15 minutes including: pelvic floor muscle contractions,  diaphragmatic breathing, TrA isolation  Piston breathing - challenged by contraction on exhalation, exhalation as if blowing through a straw    Lacey participated in dynamic functional therapeutic activities to improve functional performance for 25 minutes, including:  Reviewed fluid, benefit of exercises for reducing urgency and how to use more regularly throughout the day, eating small meal in morning, digestive bitters for GERD mgmt    Home Exercises Provided and Patient Education Provided     Education provided:   - anatomy/physiology of pelvic floor, kegels and fluid intake/dietary modifications  Discussed progression of plan of care with patient; educated pt in activity modification; reviewed HEP with pt. Pt demonstrated and verbalized understanding of all instruction and was provided with a handout of HEP (see Patient Instructions).    Written Home Exercises Provided: yes.  Exercises were reviewed and Lacey was able to demonstrate them prior to the end of the session.  Lacey demonstrated good  understanding of the education provided.     See EMR under Patient Instructions for exercises provided 11/5/2020.    Assessment     Review PFME with external palpation and demo excellent performance. Progressed for piston breathing for beginning core activation. Pt reports decreased urgency when doing exercises so recommend doing sets of 10 at regular intervals throughout the day. Only added breathing exercise so she can focus on those. She will bring diaries next session and will wait to give further advice on fluids until see those.   Lacey is progressing well towards her goals.   Pt prognosis is Excellent.     Pt will continue to benefit from skilled outpatient physical therapy to address the deficits listed in the problem list box on initial evaluation, provide pt/family education and to maximize pt's level of independence in the home and community environment.     Pt's spiritual, cultural and  educational needs considered and pt agreeable to plan of care and goals.     Anticipated barriers to physical therapy: none    Goals:   Short Term Goals: 4 weeks   Pt indep in HEP  Pt indep in functional brace technique for decreased strain of pelvic structures or UI with activities which increase IAP  Pt able to wait at least 1 hour between trips to the bathroom for improved participation in ADLs  Pt demo complete contraction and deactivation of pelvic floor muscles x 10 reps  Nocturia no more than 3x/night for improved quality of sleep and decreased nighttime fall risk     Long Term Goals: 8 weeks   Pt indep in progressive HEP  Pt reports 0 episodes of leakage in at least 2 weeks for improved ADL participation and decreased risk of skin breakdown  Pt able to wait at least 2.5 hours between trips to the bathroom for improved participation in ADLs and decreased fall risk due to urgency  Nocturia no more than 1x/night for improved quality of sleep and decreased nighttime fall risk      Plan     Add formal urge suppression, review diaries, review piston breath and progress for core strengthening as appropriate    Yue Escobar, PT

## 2020-11-24 ENCOUNTER — CLINICAL SUPPORT (OUTPATIENT)
Dept: REHABILITATION | Facility: OTHER | Age: 65
End: 2020-11-24
Payer: MEDICARE

## 2020-11-24 DIAGNOSIS — N81.89 PELVIC FLOOR WEAKNESS IN FEMALE: ICD-10-CM

## 2020-11-24 DIAGNOSIS — R27.9 LACK OF COORDINATION: Primary | ICD-10-CM

## 2020-11-24 PROCEDURE — 97530 THERAPEUTIC ACTIVITIES: CPT

## 2020-11-24 NOTE — PROGRESS NOTES
Pelvic Health Physical Therapy   Treatment Note     Name: Lacey Montana  Clinic Number: [id number]    Therapy Diagnosis:   No diagnosis found.  Physician: Adri Shah NP    Visit Date: 11/24/2020    Physician Orders: PT Eval and Treat  Medical Diagnosis from Referral: pelvic floor dysfunction  Evaluation Date: 10/6/2020  Authorization Period Expiration: 20 visits through 4/27/21  Plan of Care Expiration: 1/4/20  Visit # / Visits authorized: 3/ 20     Cancelled Visits: 0  No Show Visits: 0    Time In: 8:15  Time Out: 9:10  Total Billable Time: 55 minutes    Precautions: Standard    Subjective     Pt reports: Tried to do exercises regularly throughout the day. For two days things seemed good, did not have any leakage, but then went back to normal. But is finding that she is sleeping more, now getting up 2-3x. Yesterday had a couple little leaks, but one pad lasted all day. Leakage is when she is tired and doesn't want to get up to use the bathroom.   She was compliant with home exercise program.  Response to previous treatment: felt fine  Functional change: no change    Pain: 0/10  Location:     Objective     Lacey received therapeutic exercises to develop strength and endurance for 00 minutes including: not performed today    Lacey received the following manual therapy techniques for 00 minutes: not performed today    Lacey participated in neuromuscular re-education activities to develop Coordination, Control and Proprioception for 00 minutes including: not performed today    Lacey participated in dynamic functional therapeutic activities to improve functional performance for 60 minutes, including:  Reviewed fluid intake - decreasing to 0.5xbody weight with majority being water. Educated on effects of bladder irritants on symptoms  Review urge suppression techniques with practice in clinic in real time with urge 20 minutes after last void  Assessed bladder diaries - rec do not sit on toilet  for several minutes after each void, educated on normal PVR and constant filling of bladder  Instructed in timed voiding and how to perform, initial interval 30 minutes    Home Exercises Provided and Patient Education Provided     Education provided:   - anatomy/physiology of pelvic floor, kegels and fluid intake/dietary modifications  Discussed progression of plan of care with patient; educated pt in activity modification; reviewed HEP with pt. Pt demonstrated and verbalized understanding of all instruction and was provided with a handout of HEP (see Patient Instructions).    Written Home Exercises Provided: yes.  Exercises were reviewed and Lacey was able to demonstrate them prior to the end of the session.  Lacey demonstrated good  understanding of the education provided.     See EMR under Patient Instructions for exercises provided 11/5/2020.    Assessment     Bladder diaries reviewed and confirmed excessive fluid intake with significant intake of bladder irritants, ~70 ounce/day, and total fluid >120 oz/day. Reviewed fluid norms. High frequency and sits on toilet for several minutes to have small second void. Rec to stop this habit and reviewed urge suppression techniques. Described timed voiding and pt is interested in performing these. She will start with 30 minute intervals.  Lacey is progressing well towards her goals.   Pt prognosis is Excellent.     Pt will continue to benefit from skilled outpatient physical therapy to address the deficits listed in the problem list box on initial evaluation, provide pt/family education and to maximize pt's level of independence in the home and community environment.     Pt's spiritual, cultural and educational needs considered and pt agreeable to plan of care and goals.     Anticipated barriers to physical therapy: none    Goals:   Short Term Goals: 4 weeks   Pt indep in HEP  Pt indep in functional brace technique for decreased strain of pelvic structures or  UI with activities which increase IAP  Pt able to wait at least 1 hour between trips to the bathroom for improved participation in ADLs  Pt demo complete contraction and deactivation of pelvic floor muscles x 10 reps  Nocturia no more than 3x/night for improved quality of sleep and decreased nighttime fall risk     Long Term Goals: 8 weeks   Pt indep in progressive HEP  Pt reports 0 episodes of leakage in at least 2 weeks for improved ADL participation and decreased risk of skin breakdown  Pt able to wait at least 2.5 hours between trips to the bathroom for improved participation in ADLs and decreased fall risk due to urgency  Nocturia no more than 1x/night for improved quality of sleep and decreased nighttime fall risk      Plan     Review timed voiding and urge suppression, review piston breath, core and hips strengthening    Yue Escobar, PT

## 2020-12-24 ENCOUNTER — CLINICAL SUPPORT (OUTPATIENT)
Dept: REHABILITATION | Facility: OTHER | Age: 65
End: 2020-12-24
Attending: NURSE PRACTITIONER
Payer: MEDICARE

## 2020-12-24 DIAGNOSIS — R27.9 LACK OF COORDINATION: Primary | ICD-10-CM

## 2020-12-24 DIAGNOSIS — N81.89 PELVIC FLOOR WEAKNESS IN FEMALE: ICD-10-CM

## 2020-12-24 PROCEDURE — 97530 THERAPEUTIC ACTIVITIES: CPT

## 2020-12-24 PROCEDURE — 97110 THERAPEUTIC EXERCISES: CPT

## 2020-12-24 NOTE — PLAN OF CARE
Outpatient Therapy Updated Plan of Care     Visit Date: 12/24/2020  Name: Lacey Montana  Clinic Number: [id number]    Therapy Diagnosis: No diagnosis found.  Physician: Adri Shah, NP    Physician Orders: PT Eval and Treat  Medical Diagnosis from Referral: pelvic floor dysfunction  Evaluation Date: 10/6/2020    Total Visits Received: 4  Cancelled Visits: 1 (transportation issue)  No Show Visits:  0    Current Certification Period:  10/6/20 to 1/4/21  Precautions:  Fall risk  Visits from Evaluation Date:  4  Functional Level Prior to Evaluation:  Severely impacted by frequency, urgency - rushing to bathroom with incontinence, risk of skin breakdown and falls and sleep disrupted by nocturia    Subjective     Update: Sleeping better because she is getting up less at night. Incontinence fluctuates, sometimes really good but other times still leaking a bit. Has been working on reducing fluid intake to appropriate amount. Successful with timed voiding with 30-35 minute intervals    Objective     Update: Good demo pelvic floor mm exercise with external palpation    Assessment     Update: Pt seems to be progressing well with timed voiding, with near 100% success at 30-35 minutes. Encouraged her to attempt to increase to 45-60 minutes between voids. Pelvic mm assessment completed with external palpation but believe internal examination warranted at next session which will be in 3 weeks.    Course of care has been limited by infrequent visits (only 4 in 3 month cert period) due to scheduling and transportation difficulties, but pt is progressing well. Will be able to have greater consistency with weekly visits in January.    Previous Short Term Goals Status:     Short Term Goals: 4 weeks   Pt indep in HEP MET  Pt indep in functional brace technique for decreased strain of pelvic structures or UI with activities which increase IAP MET  Pt able to wait at least 1 hour between trips to the bathroom for improved  participation in ADLs PROGRESSING  Pt demo complete contraction and deactivation of pelvic floor muscles x 10 reps MET  Nocturia no more than 3x/night for improved quality of sleep and decreased nighttime fall risk MET     Long Term Goals: 8 weeks   Pt indep in progressive HEP PROGRESSING  Pt reports 0 episodes of leakage in at least 2 weeks for improved ADL participation and decreased risk of skin breakdown PROGRESSING  Pt able to wait at least 2.5 hours between trips to the bathroom for improved participation in ADLs and decreased fall risk due to urgency PROGRESSING  Nocturia no more than 1x/night for improved quality of sleep and decreased nighttime fall risk PROGRESSING    Long Term Goal Status:   continue per initial plan of care.  Reasons for Recertification of Therapy:   Ongoing need for skilled services, limited ability to attend visits at start of care    Plan     Updated Certification Period: 12/24/2020 to 3/22/21  Recommended Treatment Plan: 1 times per week for 8 weeks: Manual Therapy, Neuromuscular Re-ed, Patient Education, Self Care, Therapeutic Activites and Therapeutic Exercise  Other Recommendations: none    Yue Escobar, PT  12/24/2020      I CERTIFY THE NEED FOR THESE SERVICES FURNISHED UNDER THIS PLAN OF TREATMENT AND WHILE UNDER MY CARE    Physician's comments:        Physician's Signature: ___________________________________________________

## 2020-12-24 NOTE — PROGRESS NOTES
Pelvic Health Physical Therapy   Treatment Note     Name: Lacey Montana  Clinic Number: [id number]    Therapy Diagnosis:   No diagnosis found.  Physician: Adri Shah NP    Visit Date: 12/24/2020    Physician Orders: PT Eval and Treat  Medical Diagnosis from Referral: pelvic floor dysfunction  Evaluation Date: 10/6/2020  Authorization Period Expiration: 20 visits through 4/27/21  Plan of Care Expiration: 1/4/20  Visit # / Visits authorized: 4/ 20     Cancelled Visits: 0  No Show Visits: 0    Time In: 8:25 (pt arrived late)  Time Out: 9:20  Total Billable Time: 55 minutes    Precautions: Standard    Subjective     Pt reports: Had a fall and had to call EMS. Falls set her back, had grandkids with her for a few days and she has felt off balance - is going to talk to her neurologist about this Feeling like she is able to hold longer, up to 30-35 minutes in between. Trying to cut back on fluids because sees now that she needs to. Nocturia 2-3x, usually the third one she will just stay up. But does feel like she is getting better sleep. Leakage fluctuates. Stopped sitting on toilet after a void. Going through one diaper/day.  She was compliant with home exercise program.  Response to previous treatment: felt fine  Functional change: no change    Pain: 0/10  Location:     Objective     Lacey received therapeutic exercises to develop strength and endurance for 30 minutes including: n  PFME: QFs, breathing, piston breath  Pelvic tilts, LTR  Hip adduction ball squeezes  SLR  Seated hip abduction with GTB    Lacey received the following manual therapy techniques for 00 minutes: not performed today    Lacey participated in neuromuscular re-education activities to develop Coordination, Control and Proprioception for 00 minutes including: not performed today    Lacey participated in dynamic functional therapeutic activities to improve functional performance for 25 minutes, including:  Reviewed fluid  intake - decreasing to 0.5xbody weight with majority being water. Educated on effects of bladder irritants on symptoms  Review urge suppression techniques with practice in clinic in real time with urge 20 minutes after last void  Assessed bladder diaries - rec do not sit on toilet for several minutes after each void, educated on normal PVR and constant filling of bladder  Instructed in timed voiding and how to perform, initial interval 30 minutes    Home Exercises Provided and Patient Education Provided     Education provided:   - anatomy/physiology of pelvic floor, kegels and fluid intake/dietary modifications  Discussed progression of plan of care with patient; educated pt in activity modification; reviewed HEP with pt. Pt demonstrated and verbalized understanding of all instruction and was provided with a handout of HEP (see Patient Instructions).    Written Home Exercises Provided: yes.  Exercises were reviewed and Lacey was able to demonstrate them prior to the end of the session.  Lacey demonstrated good  understanding of the education provided.     See EMR under Patient Instructions for exercises provided 11/5/2020.    Assessment   Pt seems to be progressing well with timed voiding, with near 100% success at 30-35 minutes. Encouraged her to attempt to increase to 45-60 minutes between voids. Pelvic mm assessment completed with external palpation but believe internal examination warranted at next session which will be in 3 weeks.  Lacey is progressing well towards her goals.   Pt prognosis is Excellent.     Pt will continue to benefit from skilled outpatient physical therapy to address the deficits listed in the problem list box on initial evaluation, provide pt/family education and to maximize pt's level of independence in the home and community environment.     Pt's spiritual, cultural and educational needs considered and pt agreeable to plan of care and goals.     Anticipated barriers to  physical therapy: none    Goals:   Short Term Goals: 4 weeks   Pt indep in HEP  Pt indep in functional brace technique for decreased strain of pelvic structures or UI with activities which increase IAP  Pt able to wait at least 1 hour between trips to the bathroom for improved participation in ADLs  Pt demo complete contraction and deactivation of pelvic floor muscles x 10 reps  Nocturia no more than 3x/night for improved quality of sleep and decreased nighttime fall risk     Long Term Goals: 8 weeks   Pt indep in progressive HEP  Pt reports 0 episodes of leakage in at least 2 weeks for improved ADL participation and decreased risk of skin breakdown  Pt able to wait at least 2.5 hours between trips to the bathroom for improved participation in ADLs and decreased fall risk due to urgency  Nocturia no more than 1x/night for improved quality of sleep and decreased nighttime fall risk      Plan   Pelvic floor re-assessment    Yue Escobar, PT

## 2021-01-14 ENCOUNTER — CLINICAL SUPPORT (OUTPATIENT)
Dept: REHABILITATION | Facility: OTHER | Age: 66
End: 2021-01-14
Attending: FAMILY MEDICINE
Payer: MEDICARE

## 2021-01-14 DIAGNOSIS — N81.89 PELVIC FLOOR WEAKNESS IN FEMALE: ICD-10-CM

## 2021-01-14 DIAGNOSIS — R27.9 LACK OF COORDINATION: Primary | ICD-10-CM

## 2021-01-14 PROCEDURE — 97530 THERAPEUTIC ACTIVITIES: CPT

## 2021-01-14 PROCEDURE — 97112 NEUROMUSCULAR REEDUCATION: CPT

## 2022-01-25 NOTE — H&P
Colonoscopy History and Physical      Procedure : Colonoscopy    Indications:  family history of colon cancer (mother, metastatic at time of diagnosis) and personal history of colon polyps  no Changes in Bowel habits/hematochezia    Last Cscope ~5 years ago, at outside hospital    H/o CVA on aggrenox    Review of patient's allergies indicates:   Allergen Reactions    Chocolate flavor     Codeine Rash    Percocet [oxycodone-acetaminophen] Rash       No current facility-administered medications on file prior to encounter.      Current Outpatient Prescriptions on File Prior to Encounter   Medication Sig Dispense Refill    dipyridamole-aspirin 200-25 mg (AGGRENOX)  mg CM12 Take 1 capsule by mouth 2 (two) times daily. 60 capsule 11    epinephrine (EPIPEN) 0.3 mg/0.3 mL (1:1,000) AtIn Inject into the muscle once.      hydrochlorothiazide (HYDRODIURIL) 25 MG tablet Take 1 tablet (25 mg total) by mouth once daily. 90 tablet 3    levetiracetam (KEPPRA) 1000 MG tablet Take 1.5 tablets (1,500 mg total) by mouth 2 (two) times daily. 90 tablet 11    topiramate (TOPAMAX) 200 MG Tab Take 1 tablet (200 mg total) by mouth 2 (two) times daily. 60 tablet 5    sulfamethoxazole-trimethoprim 800-160mg (BACTRIM DS) 800-160 mg Tab Take 1 tablet by mouth 2 (two) times daily. 14 tablet 0    triamcinolone acetonide 0.1% (KENALOG) 0.1 % cream Apply topically 2 (two) times daily. 80 g 0       Past Medical History:   Diagnosis Date    Anticoagulant long-term use     Encounter for blood transfusion     GERD (gastroesophageal reflux disease)     Seizures     Stroke        Past Surgical History:   Procedure Laterality Date    BRAIN SURGERY  2003, 2005    HYSTERECTOMY         Family History   Problem Relation Age of Onset    Seizures Daughter 14     *pseudoseizures per patient    Colon cancer Mother 74       Social History     Social History    Marital status: Single      Need provider approval.  *Cyclobenzaprine LR: 12/12/2021 #30 (sig. TID PRN)  *Hydrocodone LR: 12/12/2021 #15 (sig. Q 8hrs prn)  *microgestin only recorded never ordered.     LOV: 08/31/2021  No pending f/u apts.   Spouse name: N/A    Number of children: N/A    Years of education: N/A     Occupational History    Not on file.     Social History Main Topics    Smoking status: Former Smoker    Smokeless tobacco: Not on file    Alcohol use No      Comment: special occasion    Drug use: Unknown    Sexual activity: Not on file     Other Topics Concern    Not on file     Social History Narrative    No narrative on file       Review of Systems -   Respiratory ROS: no cough, shortness of breath, or wheezing  Cardiovascular ROS: no chest pain or dyspnea on exertion  Gastrointestinal ROS: no abdominal pain, change in bowel habits, or black or bloody stools  Musculoskeletal ROS: negative  Neurological ROS: no TIA or stroke symptoms    Physical Exam:  General: well developed, well nourished, no distress  Head: normocephalic  Neck: supple, symmetrical, trachea midline  Lungs:  clear to auscultation bilaterally and normal respiratory effort  Heart: regular rate and rhythm, S1, S2 normal, no murmur, rub or gallop  Abdomen: soft, non-tender non-distented; bowel sounds normal; no masses,  no organomegaly  Extremities: no cyanosis or edema, or clubbing     Deep Sedation: Mallampati Score per anesthesia    ASA: III